# Patient Record
Sex: MALE | Race: WHITE | Employment: FULL TIME | URBAN - METROPOLITAN AREA
[De-identification: names, ages, dates, MRNs, and addresses within clinical notes are randomized per-mention and may not be internally consistent; named-entity substitution may affect disease eponyms.]

---

## 2017-05-15 ENCOUNTER — TELEPHONE (OUTPATIENT)
Dept: FAMILY MEDICINE CLINIC | Facility: CLINIC | Age: 52
End: 2017-05-15

## 2017-05-15 ENCOUNTER — OFFICE VISIT (OUTPATIENT)
Dept: FAMILY MEDICINE CLINIC | Facility: CLINIC | Age: 52
End: 2017-05-15

## 2017-05-15 VITALS
WEIGHT: 202 LBS | RESPIRATION RATE: 14 BRPM | TEMPERATURE: 99 F | HEIGHT: 67 IN | OXYGEN SATURATION: 100 % | BODY MASS INDEX: 31.71 KG/M2 | DIASTOLIC BLOOD PRESSURE: 80 MMHG | HEART RATE: 99 BPM | SYSTOLIC BLOOD PRESSURE: 130 MMHG

## 2017-05-15 DIAGNOSIS — R05.9 COUGH: Primary | ICD-10-CM

## 2017-05-15 DIAGNOSIS — K21.9 GASTROESOPHAGEAL REFLUX DISEASE, ESOPHAGITIS PRESENCE NOT SPECIFIED: ICD-10-CM

## 2017-05-15 DIAGNOSIS — R04.2 HEMOPTYSIS: ICD-10-CM

## 2017-05-15 DIAGNOSIS — R06.2 WHEEZING: ICD-10-CM

## 2017-05-15 DIAGNOSIS — D86.9 SARCOID: ICD-10-CM

## 2017-05-15 PROCEDURE — 99204 OFFICE O/P NEW MOD 45 MIN: CPT | Performed by: FAMILY MEDICINE

## 2017-05-15 RX ORDER — ESOMEPRAZOLE MAGNESIUM 40 MG/1
40 CAPSULE, DELAYED RELEASE ORAL DAILY
Qty: 30 CAPSULE | Refills: 1 | Status: SHIPPED | OUTPATIENT
Start: 2017-05-15 | End: 2017-07-27

## 2017-05-15 RX ORDER — OMEPRAZOLE 20 MG/1
20 CAPSULE, DELAYED RELEASE ORAL
COMMUNITY
End: 2017-05-22

## 2017-05-15 RX ORDER — AZITHROMYCIN 250 MG/1
TABLET, FILM COATED ORAL
Qty: 6 TABLET | Refills: 0 | Status: SHIPPED | OUTPATIENT
Start: 2017-05-15 | End: 2017-05-30

## 2017-05-15 NOTE — PROGRESS NOTES
HPI:    Patient ID: Abdulkadir Sotomayor is a 46year old male. HPI  Patient is here to establish care with a new primary care doctor. 2000 chest pain, dx with cardiac and lung sarcoidosis, worked for [employer], asbestos in the buildings.  GERD, 1 1/2 yrs a to palpation,  mild wheezing, no rhonchi, no rales, air entry is adequate, no accessory respiratory muscle use, no chest asymmetry, normal excursion.   GI:  bowel sound positive in all four quadrants, abdomen is soft, non-tender, non-distended, no hepatospl Referrals:  None       ZD#[id number]

## 2017-05-15 NOTE — TELEPHONE ENCOUNTER
Dr Danelle Verdugo will see patient on Friday 5/19/17 at 3:10PM at [address] [place] [address], [phone]. Patient is only able to make appointments in the morning or on Mondays. Patient will see Dr Jana Aguilera on Monday 5/22/17 @ 3pm in [place].  Patient is

## 2017-05-30 ENCOUNTER — HOSPITAL ENCOUNTER (OUTPATIENT)
Dept: GENERAL RADIOLOGY | Age: 52
Discharge: HOME | End: 2017-05-30
Attending: FAMILY MEDICINE
Payer: COMMERCIAL

## 2017-05-30 ENCOUNTER — OFFICE VISIT (OUTPATIENT)
Dept: FAMILY MEDICINE CLINIC | Facility: CLINIC | Age: 52
End: 2017-05-30

## 2017-05-30 VITALS
BODY MASS INDEX: 31.07 KG/M2 | OXYGEN SATURATION: 99 % | DIASTOLIC BLOOD PRESSURE: 78 MMHG | SYSTOLIC BLOOD PRESSURE: 132 MMHG | HEIGHT: 68 IN | RESPIRATION RATE: 12 BRPM | TEMPERATURE: 98 F | WEIGHT: 205 LBS | HEART RATE: 87 BPM

## 2017-05-30 DIAGNOSIS — D86.9 SARCOIDOSIS: ICD-10-CM

## 2017-05-30 DIAGNOSIS — R05.8 PRODUCTIVE COUGH: ICD-10-CM

## 2017-05-30 DIAGNOSIS — R06.2 WHEEZING: ICD-10-CM

## 2017-05-30 DIAGNOSIS — R06.2 WHEEZING: Primary | ICD-10-CM

## 2017-05-30 DIAGNOSIS — R04.2 HEMOPTYSIS: ICD-10-CM

## 2017-05-30 PROCEDURE — 99214 OFFICE O/P EST MOD 30 MIN: CPT | Performed by: FAMILY MEDICINE

## 2017-05-30 PROCEDURE — 71020 XR CHEST PA + LAT CHEST (CPT=71020): CPT | Performed by: FAMILY MEDICINE

## 2017-05-30 NOTE — PROGRESS NOTES
HPI:    Patient ID: Az Reddy is a 46year old male. HPI  Patient is here for follow-up of cough congestion hemoptysis and sarcoidosis. He did see GI in the interim. GI workup negative for bleeding.   Patient was put on pantoprazole instead of Nex ASSESSMENT/PLAN:   Sarcoidosis (hcc)  Wheezing  (primary encounter diagnosis)  Hemoptysis  Productive cough  I would recommend patient see a local pulmonary doctor and I understand he sees [address] once a year.   I would like evaluation for hemoptysis,

## 2017-05-31 ENCOUNTER — MED REC SCAN ONLY (OUTPATIENT)
Dept: FAMILY MEDICINE CLINIC | Facility: CLINIC | Age: 52
End: 2017-05-31

## 2017-06-09 ENCOUNTER — OFFICE VISIT (OUTPATIENT)
Dept: FAMILY MEDICINE CLINIC | Facility: CLINIC | Age: 52
End: 2017-06-09

## 2017-06-09 VITALS
WEIGHT: 205.5 LBS | HEART RATE: 93 BPM | DIASTOLIC BLOOD PRESSURE: 70 MMHG | SYSTOLIC BLOOD PRESSURE: 120 MMHG | OXYGEN SATURATION: 99 % | RESPIRATION RATE: 14 BRPM | TEMPERATURE: 98 F | BODY MASS INDEX: 31.14 KG/M2 | HEIGHT: 68 IN

## 2017-06-09 DIAGNOSIS — R05.8 PRODUCTIVE COUGH: ICD-10-CM

## 2017-06-09 DIAGNOSIS — J84.10 FIBROSIS OF LUNG (HCC): ICD-10-CM

## 2017-06-09 DIAGNOSIS — D86.9 SARCOIDOSIS: Primary | ICD-10-CM

## 2017-06-09 PROCEDURE — 99214 OFFICE O/P EST MOD 30 MIN: CPT | Performed by: FAMILY MEDICINE

## 2017-06-09 NOTE — PROGRESS NOTES
HPI:    Patient ID: Justin Mittal is a 46year old male. HPI  Patient is here for follow-up of productive cough and sarcoidosis. He did a chest x-ray done. He does get some slight trouble taking in a deep breath at times.   Review of Systems  Negative ASSESSMENT/PLAN:   Sarcoidosis (hcc)  (primary encounter diagnosis)  Fibrosis of lung (hcc)  Productive cough  Chest x-ray does show interstitial fibrosis pattern. Patient states that he was told in the past that he had sarcoidosis.   I did  the

## 2017-06-27 ENCOUNTER — OFFICE VISIT (OUTPATIENT)
Dept: FAMILY MEDICINE CLINIC | Facility: CLINIC | Age: 52
End: 2017-06-27

## 2017-06-27 VITALS
DIASTOLIC BLOOD PRESSURE: 68 MMHG | OXYGEN SATURATION: 99 % | WEIGHT: 207 LBS | HEART RATE: 99 BPM | SYSTOLIC BLOOD PRESSURE: 128 MMHG | RESPIRATION RATE: 14 BRPM | TEMPERATURE: 99 F | BODY MASS INDEX: 31 KG/M2

## 2017-06-27 DIAGNOSIS — Z00.00 ROUTINE ADULT HEALTH MAINTENANCE: ICD-10-CM

## 2017-06-27 DIAGNOSIS — D86.9 SARCOID: Primary | ICD-10-CM

## 2017-06-27 DIAGNOSIS — E78.2 MIXED HYPERLIPIDEMIA: ICD-10-CM

## 2017-06-27 PROCEDURE — 99214 OFFICE O/P EST MOD 30 MIN: CPT | Performed by: FAMILY MEDICINE

## 2017-06-27 NOTE — PROGRESS NOTES
HPI:    Patient ID: Mónica Cadena is a 46year old male. HPI  Patient is here for follow-up of sarcoidosis. He has been using Atrovent and has noticed some relief. He has no shortness of breath and only slight cough off and on.   He has appointment wi no abnormal aortic pulsation. ASSESSMENT/PLAN:   Sarcoid (hcc)  (primary encounter diagnosis)  Mixed hyperlipidemia  Routine adult health maintenance  Lungs sound clear. Patient may hold Atrovent.   If he has any breathing difficulty he will see

## 2017-07-13 PROCEDURE — 36415 COLL VENOUS BLD VENIPUNCTURE: CPT | Performed by: INTERNAL MEDICINE

## 2017-07-13 PROCEDURE — 82164 ANGIOTENSIN I ENZYME TEST: CPT | Performed by: INTERNAL MEDICINE

## 2017-08-09 ENCOUNTER — APPOINTMENT (OUTPATIENT)
Dept: LAB | Age: 52
End: 2017-08-09
Attending: FAMILY MEDICINE
Payer: COMMERCIAL

## 2017-08-09 DIAGNOSIS — Z00.00 ROUTINE ADULT HEALTH MAINTENANCE: ICD-10-CM

## 2017-08-09 LAB
ALBUMIN SERPL-MCNC: 3.8 G/DL (ref 3.5–4.8)
ALP LIVER SERPL-CCNC: 96 U/L (ref 45–117)
ALT SERPL-CCNC: 52 U/L (ref 17–63)
AST SERPL-CCNC: 31 U/L (ref 15–41)
BILIRUB SERPL-MCNC: 0.5 MG/DL (ref 0.1–2)
BUN BLD-MCNC: 14 MG/DL (ref 8–20)
CALCIUM BLD-MCNC: 8.9 MG/DL (ref 8.3–10.3)
CHLORIDE: 106 MMOL/L (ref 101–111)
CHOLEST SMN-MCNC: 166 MG/DL (ref ?–200)
CO2: 27 MMOL/L (ref 22–32)
CREAT BLD-MCNC: 1.16 MG/DL (ref 0.7–1.3)
ERYTHROCYTE [DISTWIDTH] IN BLOOD BY AUTOMATED COUNT: 12.7 % (ref 11.5–16)
GLUCOSE BLD-MCNC: 88 MG/DL (ref 70–99)
HCT VFR BLD AUTO: 47.7 % (ref 37–53)
HDLC SERPL-MCNC: 49 MG/DL (ref 45–?)
HDLC SERPL: 3.39 {RATIO} (ref ?–4.97)
HGB BLD-MCNC: 16.4 G/DL (ref 13–17)
LDLC SERPL CALC-MCNC: 70 MG/DL (ref ?–130)
LDLC SERPL-MCNC: 47 MG/DL (ref 5–40)
M PROTEIN MFR SERPL ELPH: 7.3 G/DL (ref 6.1–8.3)
MCH RBC QN AUTO: 31.3 PG (ref 27–33.2)
MCHC RBC AUTO-ENTMCNC: 34.4 G/DL (ref 31–37)
MCV RBC AUTO: 91 FL (ref 80–99)
NONHDLC SERPL-MCNC: 117 MG/DL (ref ?–130)
PLATELET # BLD AUTO: 193 10(3)UL (ref 150–450)
POTASSIUM SERPL-SCNC: 3.9 MMOL/L (ref 3.6–5.1)
PSA SERPL-MCNC: 1.58 NG/ML (ref 0.01–4)
RBC # BLD AUTO: 5.24 X10(6)UL (ref 4.3–5.7)
RED CELL DISTRIBUTION WIDTH-SD: 42.7 FL (ref 35.1–46.3)
SODIUM SERPL-SCNC: 140 MMOL/L (ref 136–144)
TRIGLYCERIDES: 234 MG/DL (ref ?–150)
WBC # BLD AUTO: 6.8 X10(3) UL (ref 4–13)

## 2017-08-09 PROCEDURE — 80061 LIPID PANEL: CPT | Performed by: FAMILY MEDICINE

## 2017-08-09 PROCEDURE — 36415 COLL VENOUS BLD VENIPUNCTURE: CPT | Performed by: FAMILY MEDICINE

## 2017-08-09 PROCEDURE — 84153 ASSAY OF PSA TOTAL: CPT | Performed by: FAMILY MEDICINE

## 2017-08-09 PROCEDURE — 80053 COMPREHEN METABOLIC PANEL: CPT | Performed by: FAMILY MEDICINE

## 2017-08-09 PROCEDURE — 85027 COMPLETE CBC AUTOMATED: CPT | Performed by: FAMILY MEDICINE

## 2017-08-14 ENCOUNTER — HOSPITAL ENCOUNTER (OUTPATIENT)
Dept: GENERAL RADIOLOGY | Age: 52
Discharge: HOME | End: 2017-08-14
Attending: FAMILY MEDICINE
Payer: COMMERCIAL

## 2017-08-14 ENCOUNTER — OFFICE VISIT (OUTPATIENT)
Dept: FAMILY MEDICINE CLINIC | Facility: CLINIC | Age: 52
End: 2017-08-14

## 2017-08-14 VITALS
HEIGHT: 68 IN | WEIGHT: 209.63 LBS | DIASTOLIC BLOOD PRESSURE: 80 MMHG | SYSTOLIC BLOOD PRESSURE: 122 MMHG | HEART RATE: 72 BPM | TEMPERATURE: 98 F | BODY MASS INDEX: 31.77 KG/M2

## 2017-08-14 DIAGNOSIS — E78.00 HIGH CHOLESTEROL: ICD-10-CM

## 2017-08-14 DIAGNOSIS — M54.5 BILATERAL LOW BACK PAIN, UNSPECIFIED CHRONICITY, WITH SCIATICA PRESENCE UNSPECIFIED: ICD-10-CM

## 2017-08-14 DIAGNOSIS — Z00.00 ROUTINE ADULT HEALTH MAINTENANCE: Primary | ICD-10-CM

## 2017-08-14 PROCEDURE — 99396 PREV VISIT EST AGE 40-64: CPT | Performed by: FAMILY MEDICINE

## 2017-08-14 PROCEDURE — 99213 OFFICE O/P EST LOW 20 MIN: CPT | Performed by: FAMILY MEDICINE

## 2017-08-14 PROCEDURE — 72110 X-RAY EXAM L-2 SPINE 4/>VWS: CPT | Performed by: FAMILY MEDICINE

## 2017-08-14 NOTE — PROGRESS NOTES
Rayna Bunch is a 46year old male who presents for a complete physical exam.   HPI:   Pt complains of chronic low back pain for several years. No trauma. He did see pulmonary doctor locally for sarcoidosis and will see them once every 6-12 months.   He Prescriptions:  Pantoprazole Sodium 40 MG Oral Tab EC Take 1 tablet (40 mg total) by mouth daily.  Before meal Disp: 30 tablet Rfl: 11   PROAIR  (90 Base) MCG/ACT Inhalation Aero Soln INHALE 2 PUFFS INTO THE LUNGS EVERY 4 (FOUR) HOURS AS NEEDED (SOB symptoms  LUNGS: denies AYUSH, wheezing, cough, orthopnea and PND  CARDIOVASCULAR: denies CP, palpitations, rapid or slow heart rate.  Denies BARKLEY/lower extremity swelling  GI: denies abdominal pain,denies heartburn, denies n/v/c/d/change in stools/blood in st x3, CN II-XII grossly intact. Reflexes: biceps and patellar 2+ b/l and symmetric.  Gait is normal.  PSYCH: normal affect, no apparent thought disorder, average judgement and insight      Immunization History  Administered            Date(s) Administered needed. He can follow-up with me in 3 months. There are no Patient Instructions on file for this visit.           XR LUMBAR SPINE (MIN 4 VIEWS) (CPT=72110)

## 2017-11-01 ENCOUNTER — MED REC SCAN ONLY (OUTPATIENT)
Dept: FAMILY MEDICINE CLINIC | Facility: CLINIC | Age: 52
End: 2017-11-01

## 2017-11-08 ENCOUNTER — OFFICE VISIT (OUTPATIENT)
Dept: FAMILY MEDICINE CLINIC | Facility: CLINIC | Age: 52
End: 2017-11-08

## 2017-11-08 VITALS
TEMPERATURE: 98 F | OXYGEN SATURATION: 99 % | SYSTOLIC BLOOD PRESSURE: 142 MMHG | HEIGHT: 68 IN | HEART RATE: 86 BPM | RESPIRATION RATE: 14 BRPM | DIASTOLIC BLOOD PRESSURE: 88 MMHG | WEIGHT: 216 LBS | BODY MASS INDEX: 32.74 KG/M2

## 2017-11-08 DIAGNOSIS — R05.8 PRODUCTIVE COUGH: Primary | ICD-10-CM

## 2017-11-08 PROCEDURE — 99213 OFFICE O/P EST LOW 20 MIN: CPT | Performed by: FAMILY MEDICINE

## 2017-11-08 RX ORDER — AMOXICILLIN AND CLAVULANATE POTASSIUM 875; 125 MG/1; MG/1
1 TABLET, FILM COATED ORAL 2 TIMES DAILY
Qty: 20 TABLET | Refills: 0 | Status: SHIPPED | OUTPATIENT
Start: 2017-11-08 | End: 2017-11-18

## 2017-11-08 RX ORDER — HYDROCODONE POLISTIREX AND CHLORPHENIRAMINE POLISTIREX 10; 8 MG/5ML; MG/5ML
5 SUSPENSION, EXTENDED RELEASE ORAL NIGHTLY PRN
Qty: 115 ML | Refills: 0 | Status: ON HOLD | OUTPATIENT
Start: 2017-11-08 | End: 2018-04-12

## 2017-11-16 ENCOUNTER — OFFICE VISIT (OUTPATIENT)
Dept: FAMILY MEDICINE CLINIC | Facility: CLINIC | Age: 52
End: 2017-11-16

## 2017-11-16 VITALS
TEMPERATURE: 98 F | OXYGEN SATURATION: 100 % | WEIGHT: 211 LBS | RESPIRATION RATE: 12 BRPM | DIASTOLIC BLOOD PRESSURE: 78 MMHG | HEART RATE: 103 BPM | SYSTOLIC BLOOD PRESSURE: 118 MMHG | BODY MASS INDEX: 31.98 KG/M2 | HEIGHT: 68 IN

## 2017-11-16 DIAGNOSIS — R05.8 PRODUCTIVE COUGH: Primary | ICD-10-CM

## 2017-11-16 DIAGNOSIS — E78.00 HIGH CHOLESTEROL: ICD-10-CM

## 2017-11-16 PROCEDURE — 99213 OFFICE O/P EST LOW 20 MIN: CPT | Performed by: FAMILY MEDICINE

## 2017-11-16 NOTE — PROGRESS NOTES
HPI:    Patient ID: Summer Gilliam is a 46year old male. HPI  Patient is here for follow-up of cough and congestion. He is doing much better. Slight loose stool with Augmentin but no nausea. He has 1 day left.   Review of Systems  Negative except for Augmentin. Reassurance. OTC cold medicine as needed. She should have fasting CMP and lipid panel in February 2018 and follow-up after that for high cholesterol check.     Orders Placed This Encounter      Comp Metabolic Panel (14) [E]      Lipid Panel [

## 2018-01-23 ENCOUNTER — LAB ENCOUNTER (OUTPATIENT)
Dept: LAB | Age: 53
End: 2018-01-23
Attending: FAMILY MEDICINE
Payer: COMMERCIAL

## 2018-01-23 DIAGNOSIS — E78.00 HIGH CHOLESTEROL: ICD-10-CM

## 2018-01-23 LAB
ALBUMIN SERPL-MCNC: 3.8 G/DL (ref 3.5–4.8)
ALP LIVER SERPL-CCNC: 78 U/L (ref 45–117)
ALT SERPL-CCNC: 46 U/L (ref 17–63)
AST SERPL-CCNC: 27 U/L (ref 15–41)
BILIRUB SERPL-MCNC: 0.5 MG/DL (ref 0.1–2)
BUN BLD-MCNC: 16 MG/DL (ref 8–20)
CALCIUM BLD-MCNC: 8.9 MG/DL (ref 8.3–10.3)
CHLORIDE: 105 MMOL/L (ref 101–111)
CHOLEST SMN-MCNC: 226 MG/DL (ref ?–200)
CO2: 27 MMOL/L (ref 22–32)
CREAT BLD-MCNC: 1.02 MG/DL (ref 0.7–1.3)
GLUCOSE BLD-MCNC: 94 MG/DL (ref 70–99)
HDLC SERPL-MCNC: 36 MG/DL (ref 45–?)
HDLC SERPL: 6.28 {RATIO} (ref ?–4.97)
LDLC SERPL CALC-MCNC: 126 MG/DL (ref ?–130)
M PROTEIN MFR SERPL ELPH: 7.2 G/DL (ref 6.1–8.3)
NONHDLC SERPL-MCNC: 190 MG/DL (ref ?–130)
POTASSIUM SERPL-SCNC: 4.1 MMOL/L (ref 3.6–5.1)
SODIUM SERPL-SCNC: 141 MMOL/L (ref 136–144)
TRIGL SERPL-MCNC: 319 MG/DL (ref ?–150)
VLDLC SERPL CALC-MCNC: 64 MG/DL (ref 5–40)

## 2018-01-23 PROCEDURE — 80053 COMPREHEN METABOLIC PANEL: CPT | Performed by: FAMILY MEDICINE

## 2018-01-23 PROCEDURE — 36415 COLL VENOUS BLD VENIPUNCTURE: CPT | Performed by: FAMILY MEDICINE

## 2018-01-23 PROCEDURE — 80061 LIPID PANEL: CPT | Performed by: FAMILY MEDICINE

## 2018-02-05 ENCOUNTER — TELEPHONE (OUTPATIENT)
Dept: FAMILY MEDICINE CLINIC | Facility: CLINIC | Age: 53
End: 2018-02-05

## 2018-02-05 DIAGNOSIS — R10.30 LOWER ABDOMINAL PAIN: ICD-10-CM

## 2018-02-05 DIAGNOSIS — E78.2 MIXED HYPERLIPIDEMIA: ICD-10-CM

## 2018-02-05 DIAGNOSIS — D86.9 SARCOID: ICD-10-CM

## 2018-02-05 RX ORDER — SIMVASTATIN 40 MG
TABLET ORAL
Qty: 90 TABLET | Refills: 1 | Status: SHIPPED | OUTPATIENT
Start: 2018-02-05 | End: 2018-08-11

## 2018-02-05 RX ORDER — FENOFIBRATE 200 MG/1
200 CAPSULE ORAL DAILY
Qty: 90 CAPSULE | Refills: 1 | Status: SHIPPED | OUTPATIENT
Start: 2018-02-05 | End: 2018-08-11

## 2018-02-05 NOTE — TELEPHONE ENCOUNTER
Requesting Simvastatin and Fenofibrate  LOV: 11/16/17  RTC: not noted  Last Relevant Labs: 1/23/18  Filled: 4/25/16 #90 with 0 refills  Fenofibrate  Filled: 4/4/17 #90 with 0 refills  Simvastatin    Future Appointments  Date Time Provider Tiffanie Do

## 2018-02-05 NOTE — PROGRESS NOTES
Elevated cholesterol, pt needs to work on diet and exercise, has pt been taking his medications?   Will discuss more at upcoming [facility]

## 2018-02-19 ENCOUNTER — HOSPITAL ENCOUNTER (OUTPATIENT)
Dept: GENERAL RADIOLOGY | Age: 53
Discharge: HOME | End: 2018-02-19
Attending: FAMILY MEDICINE
Payer: COMMERCIAL

## 2018-02-19 ENCOUNTER — OFFICE VISIT (OUTPATIENT)
Dept: FAMILY MEDICINE CLINIC | Facility: CLINIC | Age: 53
End: 2018-02-19

## 2018-02-19 VITALS
HEIGHT: 68 IN | DIASTOLIC BLOOD PRESSURE: 80 MMHG | SYSTOLIC BLOOD PRESSURE: 134 MMHG | BODY MASS INDEX: 32.58 KG/M2 | WEIGHT: 215 LBS | OXYGEN SATURATION: 99 % | HEART RATE: 82 BPM | TEMPERATURE: 98 F | RESPIRATION RATE: 16 BRPM

## 2018-02-19 DIAGNOSIS — E78.2 MIXED HYPERLIPIDEMIA: ICD-10-CM

## 2018-02-19 DIAGNOSIS — N52.9 ERECTILE DYSFUNCTION, UNSPECIFIED ERECTILE DYSFUNCTION TYPE: ICD-10-CM

## 2018-02-19 DIAGNOSIS — R05.9 COUGH: Primary | ICD-10-CM

## 2018-02-19 DIAGNOSIS — R05.9 COUGH: ICD-10-CM

## 2018-02-19 PROCEDURE — 71046 X-RAY EXAM CHEST 2 VIEWS: CPT | Performed by: FAMILY MEDICINE

## 2018-02-19 PROCEDURE — 99214 OFFICE O/P EST MOD 30 MIN: CPT | Performed by: FAMILY MEDICINE

## 2018-02-19 RX ORDER — TADALAFIL 10 MG/1
TABLET ORAL
Qty: 10 TABLET | Refills: 3 | Status: SHIPPED | OUTPATIENT
Start: 2018-02-19 | End: 2019-11-11

## 2018-02-19 NOTE — PROGRESS NOTES
HPI:    Patient ID: Timothy Cifuentes is a 46year old male. HPI  Patient is here for follow-up of high cholesterol. He states he just recently over the past 1 week restarted his cholesterol medicine fenofibrate and simvastatin 40 mg.   He had run out of m wheezing, no rhonchi, no rales, air entry is adequate, no accessory respiratory muscle use, no chest asymmetry, normal excursion.   GI:  bowel sound positive in all four quadrants, abdomen is soft, non-tender, non-distended, no hepatosplenomegaly, no abnorm

## 2018-04-12 ENCOUNTER — APPOINTMENT (OUTPATIENT)
Dept: CV DIAGNOSTICS | Age: 53
DRG: 313 | End: 2018-04-12
Attending: EMERGENCY MEDICINE
Payer: COMMERCIAL

## 2018-04-12 ENCOUNTER — APPOINTMENT (OUTPATIENT)
Dept: CT IMAGING | Age: 53
DRG: 313 | End: 2018-04-12
Attending: EMERGENCY MEDICINE
Payer: COMMERCIAL

## 2018-04-12 ENCOUNTER — HOSPITAL ENCOUNTER (INPATIENT)
Facility: HOSPITAL | Age: 53
LOS: 2 days | Discharge: HOME | DRG: 313 | End: 2018-04-15
Attending: EMERGENCY MEDICINE | Admitting: INTERNAL MEDICINE
Payer: COMMERCIAL

## 2018-04-12 ENCOUNTER — APPOINTMENT (OUTPATIENT)
Dept: GENERAL RADIOLOGY | Age: 53
DRG: 313 | End: 2018-04-12
Attending: EMERGENCY MEDICINE
Payer: COMMERCIAL

## 2018-04-12 DIAGNOSIS — R07.89 CHEST PAIN, ATYPICAL: Primary | ICD-10-CM

## 2018-04-12 DIAGNOSIS — R06.00 DYSPNEA, UNSPECIFIED TYPE: ICD-10-CM

## 2018-04-12 PROCEDURE — 99219 INITIAL OBSERVATION CARE,LEVL II: CPT | Performed by: INTERNAL MEDICINE

## 2018-04-12 PROCEDURE — 71045 X-RAY EXAM CHEST 1 VIEW: CPT | Performed by: EMERGENCY MEDICINE

## 2018-04-12 PROCEDURE — 71275 CT ANGIOGRAPHY CHEST: CPT | Performed by: EMERGENCY MEDICINE

## 2018-04-12 RX ORDER — SODIUM CHLORIDE 9 MG/ML
INJECTION, SOLUTION INTRAVENOUS CONTINUOUS
Status: ACTIVE | OUTPATIENT
Start: 2018-04-12 | End: 2018-04-12

## 2018-04-12 RX ORDER — ONDANSETRON 2 MG/ML
4 INJECTION INTRAMUSCULAR; INTRAVENOUS EVERY 6 HOURS PRN
Status: DISCONTINUED | OUTPATIENT
Start: 2018-04-12 | End: 2018-04-15

## 2018-04-12 RX ORDER — ONDANSETRON 2 MG/ML
4 INJECTION INTRAMUSCULAR; INTRAVENOUS EVERY 4 HOURS PRN
Status: DISCONTINUED | OUTPATIENT
Start: 2018-04-12 | End: 2018-04-12 | Stop reason: HOSPADM

## 2018-04-12 RX ORDER — ASPIRIN 81 MG/1
81 TABLET, CHEWABLE ORAL ONCE
Status: DISCONTINUED | OUTPATIENT
Start: 2018-04-12 | End: 2018-04-15

## 2018-04-12 RX ORDER — NITROGLYCERIN 0.4 MG/1
0.4 TABLET SUBLINGUAL EVERY 5 MIN PRN
Status: DISCONTINUED | OUTPATIENT
Start: 2018-04-12 | End: 2018-04-15

## 2018-04-12 RX ORDER — ASPIRIN 325 MG
325 TABLET ORAL DAILY
Status: DISCONTINUED | OUTPATIENT
Start: 2018-04-12 | End: 2018-04-15

## 2018-04-12 RX ORDER — ACETAMINOPHEN 325 MG/1
650 TABLET ORAL EVERY 6 HOURS PRN
Status: DISCONTINUED | OUTPATIENT
Start: 2018-04-12 | End: 2018-04-15

## 2018-04-12 RX ORDER — ENOXAPARIN SODIUM 100 MG/ML
40 INJECTION SUBCUTANEOUS NIGHTLY
Status: DISCONTINUED | OUTPATIENT
Start: 2018-04-12 | End: 2018-04-15

## 2018-04-12 RX ORDER — MORPHINE SULFATE 4 MG/ML
4 INJECTION, SOLUTION INTRAMUSCULAR; INTRAVENOUS ONCE
Status: COMPLETED | OUTPATIENT
Start: 2018-04-12 | End: 2018-04-12

## 2018-04-12 RX ORDER — HYDROXYCHLOROQUINE SULFATE 200 MG/1
200 TABLET, FILM COATED ORAL 2 TIMES DAILY
Status: DISCONTINUED | OUTPATIENT
Start: 2018-04-12 | End: 2018-04-15

## 2018-04-12 RX ORDER — ATORVASTATIN CALCIUM 20 MG/1
20 TABLET, FILM COATED ORAL NIGHTLY
Status: DISCONTINUED | OUTPATIENT
Start: 2018-04-12 | End: 2018-04-15

## 2018-04-12 RX ORDER — PANTOPRAZOLE SODIUM 40 MG/1
40 TABLET, DELAYED RELEASE ORAL DAILY
Status: DISCONTINUED | OUTPATIENT
Start: 2018-04-12 | End: 2018-04-15

## 2018-04-12 NOTE — ED INITIAL ASSESSMENT (HPI)
Pt started having some chest pain a couple days ago feeling nauseated and diaphoretic. Pt not vomited. Pt states the pain comes and goes no activity.

## 2018-04-12 NOTE — ED PROVIDER NOTES
Patient Seen in: [facility] Emergency Department In [place]    History   Patient presents with:  Chest Pain Angina (cardiovascular)    Stated Complaint: chest pain    HPI    Patient is a 49-year-old male presents emergency room with a history of intermitt Comment: weekends      Review of Systems    Positive for stated complaint: chest pain  Other systems are as noted in HPI. Constitutional and vital signs reviewed. All other systems reviewed and negative except as noted above.     Physical Exam   ED Tr Non- 59 (*)     All other components within normal limits   CBC W/ DIFFERENTIAL - Abnormal; Notable for the following:     Eosinophil Absolute 0.33 (*)     All other components within normal limits   TROPONIN I - Normal   PROTHROMBIN TIME ( etiologies are not excluded. 5.  2 small right lower lobe lung nodules. These may be related to parenchymal manifestations of sarcoidosis and are adjacent to a surgical staple line. Other etiologies are not excluded.  6.  Please correlate with prior cong Disposition and Plan     Clinical Impression:  Chest pain, atypical  (primary encounter diagnosis)  Dyspnea, unspecified type    Disposition:  Admit  4/12/2018  5:54 pm    Follow-up:  No follow-up provider specified.       Medications Prescribed:  Cur

## 2018-04-12 NOTE — PROGRESS NOTES
[facility]:  Patient from ER here for stress echo, but had to stop after 3:30 on treadmill due to shortness of breath and heartrate was only 116.   Patient states he has history of sarcoidosis of heart and lungs, and was once on the adler

## 2018-04-13 ENCOUNTER — APPOINTMENT (OUTPATIENT)
Dept: CV DIAGNOSTICS | Facility: HOSPITAL | Age: 53
DRG: 313 | End: 2018-04-13
Attending: INTERNAL MEDICINE
Payer: COMMERCIAL

## 2018-04-13 ENCOUNTER — APPOINTMENT (OUTPATIENT)
Dept: GENERAL RADIOLOGY | Facility: HOSPITAL | Age: 53
DRG: 313 | End: 2018-04-13
Attending: INTERNAL MEDICINE
Payer: COMMERCIAL

## 2018-04-13 PROCEDURE — 71046 X-RAY EXAM CHEST 2 VIEWS: CPT | Performed by: INTERNAL MEDICINE

## 2018-04-13 PROCEDURE — 93017 CV STRESS TEST TRACING ONLY: CPT | Performed by: INTERNAL MEDICINE

## 2018-04-13 PROCEDURE — 93306 TTE W/DOPPLER COMPLETE: CPT | Performed by: INTERNAL MEDICINE

## 2018-04-13 PROCEDURE — 99225 SUBSEQUENT OBSERVATION CARE: CPT | Performed by: HOSPITALIST

## 2018-04-13 PROCEDURE — 78452 HT MUSCLE IMAGE SPECT MULT: CPT | Performed by: INTERNAL MEDICINE

## 2018-04-13 PROCEDURE — 93018 CV STRESS TEST I&R ONLY: CPT | Performed by: INTERNAL MEDICINE

## 2018-04-13 RX ORDER — MAGNESIUM OXIDE 400 MG (241.3 MG MAGNESIUM) TABLET
400 TABLET ONCE
Status: COMPLETED | OUTPATIENT
Start: 2018-04-13 | End: 2018-04-13

## 2018-04-13 NOTE — PLAN OF CARE
Patient/Family Goals    • Patient/Family Long Term Goal Progressing    • Patient/Family Short Term Goal Progressing          HX: sarcoid- seen at [facility]- last stress 2017- cant get records    Patient came in for CP, stress echo started but had to stop after

## 2018-04-13 NOTE — PLAN OF CARE
Patient arrived from [place] ED at 6:55pm  Oriented to room  VSS taken; placed on telemetry    Report given to oncoming nurse

## 2018-04-13 NOTE — PAYOR COMM NOTE
--------------  ADMISSION REVIEW     Payor: [facility] PPO  Subscriber #:  [id number]  Authorization Number: N/A    Admit date: N/A  Admit time: N/A       Admitting Physician: Mason Campos MD  Attending Physician:  Alexx Nguyen MD  Primary Care Physician: History:  No date: COLONOSCOPY  11/2015: COLONOSCOPY      Comment: 4 small adeonomas, diverticulosis. repeat 2018  11/2015: EGD      Comment:  schatzki's ring, non obstructive. Erosive                esophagitis, LA grad 1 (no barretts on path). extremities. There are no gross motor or sensory deficits appreciated. Cranial nerves II through XII are intact. Patient is answering all questions appropriately.     ED Course     Labs Reviewed   COMP METABOLIC PANEL (14) - Abnormal; Notable for the follo 4/12/2018  CONCLUSION:  No focal consolidation. No significant change when compared to 2/19/18 chest radiograph.      Dictated by: Keshawn Aguilera MD on 4/12/2018 at 13:51     Approved by: Keshawn Aguilera MD      Patient had IV line established and blood work drawn i Filed:  4/13/2018 12:29 AM Date of Service:  4/12/2018  9:10 PM Status:  Signed    :  Tanesha Pacheco MD (Physician)         [facility]                                                               History & Physical     Marce Ward Patient 14 point review of systems was completed. Pertinent positives and negatives noted in the the HPI. Physical Exam:   Vital signs: Blood pressure 140/93, pulse 95, temperature 98.7 °F (37.1 °C), temperature source Oral, resp.  rate 20, height 5' 8\" (1.727 and hilar adenopathy. This may be related to the known sarcoidosis. Other etiologies are not excluded. 5.  2 small right lower lobe lung nodules.   These may be related to parenchymal manifestations of sarcoidosis and are adjacent to a surgical staple li User    4/12/2018 1802 Given 4 mg Intramuscular (Left Arm) Ingrid Villegas RN      ondansetron HCl [facility]) injection 4 mg     Date Action Dose Route User    4/12/2018 3250 Given 4 mg Intravenous Doreen Perez RN          REVIEWER COMMENTS:     FOR RE

## 2018-04-13 NOTE — PLAN OF CARE
A/o x3   c/o lightheadedness, dizziness, and occasional chest pain. Pt resting in bed. Cardiology to see pt. Dr. Luis Dunlap requesting records from [facility]. 1249:  Dr. Krista Del Real assessed and is ordering a lexiscan for today.      1712: Notifie

## 2018-04-13 NOTE — CM/SW NOTE
04/13/18 1400   CM/SW Screening   Referral [id number] [facility] staff; Chart review;Nursing rounds   Patient's Mental Status Alert;Oriented   Patient Status Prior to Admission   Independent with ADLs and Mobility Yes     Patient was

## 2018-04-13 NOTE — PROGRESS NOTES
[facility]  Progress Note     Giovani Munson Patient Status:  Observation    1965 MRN [id number]   [facility] 8NE-A Attending Estephania Fang MD   Hosp Day # 0 PCP Columbus Rubinstein, MD     Chief Complaint: chest pain    S: Patie BID   • Pantoprazole Sodium  40 mg Oral Daily   • fenofibrate micronized  201 mg Oral Daily   • atorvastatin  20 mg Oral Nightly   • aspirin  325 mg Oral Daily   • enoxaparin  40 mg Subcutaneous Nightly   • cefTRIAXone  1 g Intravenous Q24H   • anna

## 2018-04-13 NOTE — H&P
[facility]                                                               History & Physical         Fior Jalenerik Patient Status:  Observation    1965 MRN [id number]   [facility] 8NE-A Attending Viviane Preston MD   [facility] Day # Diabetes Sister       Reviewed    Social history:   reports that he has been smoking. He has never used smokeless tobacco. He reports that he drinks alcohol. He reports that he does not use drugs.     Allergies:    Prednisone              Hives    Home Med 46.0 04/12/2018   .0 04/12/2018   CREATSERUM 1.37 04/12/2018   BUN 12 04/12/2018    04/12/2018   K 4.0 04/12/2018    04/12/2018   CO2 27.0 04/12/2018   GLU 81 04/12/2018   CA 8.9 04/12/2018   ALB 4.0 04/12/2018   ALKPHO 71 04/12/2018   B cm.  VILMA:  Small bilateral lymph nodes. More prominent on the right 2.1 x 1.7 cm. More prominent on the left 1.5 x 1.2 cm. CARDIAC:  No pericardial effusion. PLEURA:  No pleural effusion. Mild areas of pleural thickening on the right.   CHEST WALL:  N

## 2018-04-13 NOTE — CONSULTS
[facility]  Report of Consultation    Vanessasam Beck Patient Status:  Observation    1965 MRN [id number]   [facility] 8NE-A Attending Ki Baugh MD   Hosp Day # 0 PCP Zion Lang MD     Reason for Consult Diabetes Sister       Smoking status: Current Some Day Smoker                                                    Packs/day: 0.00      Years: 0.00      Smokeless tobacco: Never Used                      Alcohol use: Yes           0.0 oz/week     Comment: we nourished male. Pt is in no acute distress. HEENT:   Normocephalic. Atraumatic. Eyes with no scleral icterus. Neck: Supple. No JVD. Carotids 2+ and equal in symmetric fashion. No bruits are noted. Cardiac: Regular rate and rhythm.    There is a nor Differential considerations include diffuse pneumonitis versus distal small airways disease. 4.  There is mild mediastinal and hilar adenopathy. This may be related to the known sarcoidosis. Other etiologies are not excluded.   5.  2 small right lower lo

## 2018-04-13 NOTE — PROGRESS NOTES
Preliminary Cardiac Diagnostic Note:    No ekg changes after pt injected with lexiscan for nuclear stress test  Pt denied cardiac symptoms  No arrhythmias  nuc pending

## 2018-04-13 NOTE — CONSULTS
Pulmonary H&P/Consult       NAME: Afsaneh Iraheta - ROOM: [address] [place] [address] - MRN: [id number] - Age: 46year old - :  1965    Date of Admission: 2018  1:04 PM  Admission Diagnosis: Chest pain, atypical [R07.89]  Dyspnea, unspecified type [R06.00]  Re Gastritis, no hpylori on path  No date: OTHER SURGICAL HISTORY      Comment: septoplasty     Prescriptions Prior to Admission:  Tadalafil 10 MG Oral Tab 1 tablet 1 hour prior to intercourse, may repeat in 72 hours as needed.  Disp: 10 tablet Rfl: 3 Taking Pantoprazole Sodium 40 MG Oral Tab EC Take 1 tablet (40 mg total) by mouth daily.  Before meal Disp: 90 tablet Rfl: 3   HYDROXYCHLOROQUINE SULFATE 200 MG Oral Tab TAKE 1 TABLET BY MOUTH TWICE A DAY Disp: 180 tablet Rfl: 2       Scheduled Medication:  • as Intake             1150 ml   Output                0 ml   Net             1150 ml       /66 (BP Location: Right arm)   Pulse 78   Temp 98.4 °F (36.9 °C) (Oral)   Resp 18   Ht 5' 8\" (1.727 m)   Wt 211 lb 11.2 oz (96 kg)   SpO2 97%   BMI 32.19 kg/m² hours.    Invalid input(s): TROPI      INR   Date/Time Value Ref Range Status   04/12/2018 01:19 PM 1.01 0.89 - 1.12 Final   ----------  Albumin   Date/Time Value Ref Range Status   04/12/2018 01:19 PM 4.0 3.5 - 4.8 g/dL Final   04/11/2016 10:53 AM 4.6 3.5

## 2018-04-13 NOTE — PAYOR COMM NOTE
--------------  CONTINUED STAY REVIEW    Payor: [facility] PPO  Subscriber #:  [id number]  Authorization Number: N/A    Admit date: N/A  Admit time: N/A    Admitting Physician: Stanley Fleischer, MD  Attending Physician:  Axel Wiley MD  Primary Care Physician more recent imaging will be able to make a better informed decision as to how to proceed  -await outside records, if CT changes are chronic could attempt a burst of IV steroids vs decadron and monitor for improvement  -if changes are new would consider bro Dose Route User    4/13/2018 0944 Given 40 mg Oral Aleisha Curry, RN      fenofibrate micronized (LOFIBRA) cap 201 mg     Date Action Dose Route User    4/13/2018 0944 Given 201 mg Oral Aleisha Curry, RN          FOR CONTINUED REVIEW/APPROVAL     PLEASE

## 2018-04-14 PROCEDURE — 99232 SBSQ HOSP IP/OBS MODERATE 35: CPT | Performed by: HOSPITALIST

## 2018-04-14 RX ORDER — MAGNESIUM OXIDE 400 MG (241.3 MG MAGNESIUM) TABLET
400 TABLET ONCE
Status: COMPLETED | OUTPATIENT
Start: 2018-04-14 | End: 2018-04-14

## 2018-04-14 RX ORDER — METHYLPREDNISOLONE SODIUM SUCCINATE 125 MG/2ML
125 INJECTION, POWDER, LYOPHILIZED, FOR SOLUTION INTRAMUSCULAR; INTRAVENOUS EVERY 8 HOURS
Status: DISCONTINUED | OUTPATIENT
Start: 2018-04-14 | End: 2018-04-15

## 2018-04-14 NOTE — PROGRESS NOTES
[facility]  Progress Note     Summer Gilliam Patient Status:  Observation    1965 MRN [id number]   [facility] 8NE-A Attending Kacy Caldwell MD   Hosp Day # 1 PCP Mihaela Ng MD     Chief Complaint: chest pain    S: Patie 20 mg Oral Nightly   • aspirin  325 mg Oral Daily   • enoxaparin  40 mg Subcutaneous Nightly   • cefTRIAXone  1 g Intravenous Q24H   • azithromycin  500 mg Intravenous Q24H       ASSESSMENT / PLAN:     1.  Chest pain- Unable to complete OP ST. s/p nuc stres

## 2018-04-14 NOTE — PROGRESS NOTES
Pulmonary Progress Note      NAME: Sheri Avelar - ROOM: 5773/2595-H - MRN: [id number] - Age: 46year old - : 1965    Assessment/Plan:  1.  Dyspnea  -unclear etiology  -may be related to CT changes  -no O2 requirement currently  -no wheezing on exa clear, MMM  Neck: Supple, no adenopathy or elevation in JVP  Cardiovascular: RRR, no murmurs or extra heart sounds   Respiratory: CTAB, no wheezing   GI: Soft, NTND, +normoactive BS   Ext: No cyanosis, clubbing or edema   Neuro: Interactive, answering ques

## 2018-04-14 NOTE — PROGRESS NOTES
[facility] Cardiology  Progress Note    Fior Mckeon Patient Status:  Inpatient    1965 MRN [id number]   [facility] 8NE-A Attending Austin Khan MD   Hosp Day # 1 PCP Mariano Baca MD     Subjective:   No chest pain t There is normal S1, S2. There is no S3 or S4. There are no murmurs, rubs, or gallops. No click is appreciated. PMI is nondisplaced with a normal apical impulse. Pulmonary:  Lungs are clear to auscultation bilaterally.   There are no focal rales, rhon normal. Systolic pressure was at the upper limits     of normal.  3. Left atrium: The left atrial volume was normal.  4. Aortic root: The aortic root was mildly dilated. 5. Mitral valve: There was trivial regurgitation. 6. Tricuspid valve:  There was mild

## 2018-04-15 VITALS
OXYGEN SATURATION: 95 % | HEIGHT: 68 IN | RESPIRATION RATE: 18 BRPM | WEIGHT: 211.69 LBS | SYSTOLIC BLOOD PRESSURE: 121 MMHG | TEMPERATURE: 98 F | HEART RATE: 86 BPM | DIASTOLIC BLOOD PRESSURE: 71 MMHG | BODY MASS INDEX: 32.08 KG/M2

## 2018-04-15 PROCEDURE — 99239 HOSP IP/OBS DSCHRG MGMT >30: CPT | Performed by: HOSPITALIST

## 2018-04-15 RX ORDER — MAGNESIUM OXIDE 400 MG (241.3 MG MAGNESIUM) TABLET
400 TABLET ONCE
Status: COMPLETED | OUTPATIENT
Start: 2018-04-15 | End: 2018-04-15

## 2018-04-15 NOTE — PROGRESS NOTES
[facility]  Progress Note    Abdulkadir Sotomayor Patient Status:  Inpatient    1965 MRN [id number]   [facility] 8NE-A Attending Cecil Crenshaw MD   Hosp Day # 2 PCP Meme Edwards MD     Subjective:   Comfortable in demonstrates a regular rate and rhythm. There is normal S1, S2. There is no S3 or S4. There are no murmurs, rubs, or gallops. No click is appreciated. PMI is nondisplaced with a normal apical impulse.     Pulmonary:  Lungs are clear to auscultation jade thickness was normal.     Systolic function was normal. Systolic pressure was at the upper limits     of normal.  3. Left atrium: The left atrial volume was normal.  4. Aortic root: The aortic root was mildly dilated. 5. Mitral valve:  There was trivial re

## 2018-04-15 NOTE — PLAN OF CARE
Problem: CARDIOVASCULAR - ADULT  Goal: Maintains optimal cardiac output and hemodynamic stability  INTERVENTIONS:  - Monitor vital signs, rhythm, and trends  - Monitor for bleeding, hypotension and signs of decreased cardiac output  - Evaluate effectivenes electrolyte imbalances  - Administer electrolyte replacement as ordered  - Monitor response to electrolyte replacements, including rhythm and repeat lab results as appropriate  - Fluid restriction as ordered  - Instruct patient on fluid and nutrition restr

## 2018-04-15 NOTE — PROGRESS NOTES
[facility] Patient Status:  Inpatient    1965 MRN [id number]   [facility] 8NE-A Attending Desmond Dawson MD   Hosp Day # 2 PCP Alton Lora MD     SUBJECTIVE: no new events.  Still has mild dyspnea and dry co INR 1.01 04/12/2018           Assessment/Plan:  1. Dyspnea  -unclear etiology  -doubt it is related to sarcoidosis flare up nor acute infection. Has not had fevers/chills/phlegm production. Cough is chronic and unchanged.   Inflammatory markers are norm

## 2018-04-15 NOTE — PROGRESS NOTES
[facility]  Progress Note     Nighat Goldberg Patient Status:  Observation    1965 MRN [id number]   [facility] 8NE-A Attending Heidi Calix MD   Hosp Day # 2 PCP Ramírez Atkins MD     Chief Complaint: chest pain    S: Patie 20 mg Oral Nightly   • aspirin  325 mg Oral Daily   • enoxaparin  40 mg Subcutaneous Nightly   • cefTRIAXone  1 g Intravenous Q24H       ASSESSMENT / PLAN:     1. Chest pain- Unable to complete OP ST. s/p nuc stress unremarkable  1.  Cardiology and pulmonar

## 2018-04-16 ENCOUNTER — PATIENT OUTREACH (OUTPATIENT)
Dept: CASE MANAGEMENT | Age: 53
End: 2018-04-16

## 2018-04-16 ENCOUNTER — OFFICE VISIT (OUTPATIENT)
Dept: FAMILY MEDICINE CLINIC | Facility: CLINIC | Age: 53
End: 2018-04-16

## 2018-04-16 VITALS
RESPIRATION RATE: 14 BRPM | HEIGHT: 68 IN | TEMPERATURE: 98 F | DIASTOLIC BLOOD PRESSURE: 80 MMHG | SYSTOLIC BLOOD PRESSURE: 134 MMHG | BODY MASS INDEX: 32.81 KG/M2 | HEART RATE: 77 BPM | WEIGHT: 216.5 LBS | OXYGEN SATURATION: 100 %

## 2018-04-16 DIAGNOSIS — J18.9 PNEUMONITIS: ICD-10-CM

## 2018-04-16 DIAGNOSIS — R06.00 DYSPNEA, UNSPECIFIED TYPE: ICD-10-CM

## 2018-04-16 DIAGNOSIS — Z02.9 ENCOUNTERS FOR ADMINISTRATIVE PURPOSE: ICD-10-CM

## 2018-04-16 DIAGNOSIS — R07.89 CHEST PAIN, ATYPICAL: Primary | ICD-10-CM

## 2018-04-16 PROCEDURE — 99214 OFFICE O/P EST MOD 30 MIN: CPT | Performed by: FAMILY MEDICINE

## 2018-04-16 PROCEDURE — 1111F DSCHRG MED/CURRENT MED MERGE: CPT | Performed by: FAMILY MEDICINE

## 2018-04-16 NOTE — DISCHARGE SUMMARY
[facility]  DISCHARGE SUMMARY     Deon Case Patient Status:  Inpatient    1965 MRN [id number]   [facility] 8NE-A Attending No att. providers found   [address] Day # 2 PCP Megan Salazar MD     Date of Admission: 2018  Da Synopsis: Cardiology and pulmonology were placed on consultation. Workup was virtually unremarkable. There is no acute sign of infection or exacerbation of sarcoidosis. Antibiotics were subsequently discontinued as well as IV steroids.   Patient was on r INSTRUCTIONS: See electronic chart    Obdulia Ray MD 4/16/2018    Time spent:  > 30 minutes

## 2018-04-16 NOTE — PROGRESS NOTES
HPI:    Patient ID: Hanna Vega is a 46year old male. HPI  Patient is here for follow-up of hospitalization for dyspnea and chest pain. He had cardiac workup done which was negative. Lung workup was negative as well.   Patient has history of sarcoi Judgement and insight are intact. No suicidal thoughts.    MS: No pain to palpation of the anterior chest wall         ASSESSMENT/PLAN:   Chest pain, atypical  (primary encounter diagnosis)  Dyspnea, unspecified type  Pneumonitis  Hospital records reviewed

## 2018-08-11 DIAGNOSIS — D86.9 SARCOID: ICD-10-CM

## 2018-08-11 DIAGNOSIS — R10.30 LOWER ABDOMINAL PAIN: ICD-10-CM

## 2018-08-11 DIAGNOSIS — E78.2 MIXED HYPERLIPIDEMIA: ICD-10-CM

## 2018-08-11 RX ORDER — FENOFIBRATE 200 MG/1
200 CAPSULE ORAL DAILY
Qty: 90 CAPSULE | Refills: 1 | Status: SHIPPED | OUTPATIENT
Start: 2018-08-11 | End: 2019-07-24

## 2018-08-11 RX ORDER — SIMVASTATIN 40 MG
TABLET ORAL
Qty: 90 TABLET | Refills: 1 | Status: SHIPPED | OUTPATIENT
Start: 2018-08-11 | End: 2019-02-08

## 2018-08-11 NOTE — TELEPHONE ENCOUNTER
Requested Medications   SIMVASTATIN 40 MG TABLET  Will file in chart as: SIMVASTATIN 40 MG Oral Tab  TAKE 1 TABLET (40 MG TOTAL) BY MOUTH ONCE DAILY.        Disp: 90 tablet Refills: 1    Class: Normal Start: 8/11/2018   Documented:2 years ago  Last refill:

## 2018-10-24 ENCOUNTER — OFFICE VISIT (OUTPATIENT)
Dept: FAMILY MEDICINE CLINIC | Facility: CLINIC | Age: 53
End: 2018-10-24
Payer: COMMERCIAL

## 2018-10-24 ENCOUNTER — APPOINTMENT (OUTPATIENT)
Dept: LAB | Age: 53
End: 2018-10-24
Attending: FAMILY MEDICINE
Payer: COMMERCIAL

## 2018-10-24 VITALS
BODY MASS INDEX: 32.13 KG/M2 | OXYGEN SATURATION: 99 % | RESPIRATION RATE: 12 BRPM | DIASTOLIC BLOOD PRESSURE: 98 MMHG | WEIGHT: 212 LBS | HEIGHT: 68 IN | SYSTOLIC BLOOD PRESSURE: 142 MMHG | TEMPERATURE: 99 F | HEART RATE: 88 BPM

## 2018-10-24 DIAGNOSIS — R07.9 CHEST PAIN, UNSPECIFIED TYPE: ICD-10-CM

## 2018-10-24 DIAGNOSIS — R07.9 CHEST PAIN, UNSPECIFIED TYPE: Primary | ICD-10-CM

## 2018-10-24 DIAGNOSIS — R05.9 COUGH: ICD-10-CM

## 2018-10-24 PROCEDURE — 93010 ELECTROCARDIOGRAM REPORT: CPT | Performed by: INTERNAL MEDICINE

## 2018-10-24 PROCEDURE — 36415 COLL VENOUS BLD VENIPUNCTURE: CPT

## 2018-10-24 PROCEDURE — 84484 ASSAY OF TROPONIN QUANT: CPT

## 2018-10-24 PROCEDURE — 93005 ELECTROCARDIOGRAM TRACING: CPT

## 2018-10-24 PROCEDURE — 85378 FIBRIN DEGRADE SEMIQUANT: CPT

## 2018-10-24 PROCEDURE — 99214 OFFICE O/P EST MOD 30 MIN: CPT | Performed by: FAMILY MEDICINE

## 2018-10-24 RX ORDER — AMOXICILLIN AND CLAVULANATE POTASSIUM 500; 125 MG/1; MG/1
1 TABLET, FILM COATED ORAL 2 TIMES DAILY
Qty: 20 TABLET | Refills: 0 | Status: SHIPPED | OUTPATIENT
Start: 2018-10-24 | End: 2018-11-03

## 2018-10-24 RX ORDER — LISINOPRIL 10 MG/1
10 TABLET ORAL DAILY
Qty: 30 TABLET | Refills: 0 | Status: SHIPPED | OUTPATIENT
Start: 2018-10-24 | End: 2018-10-31

## 2018-10-25 NOTE — PROGRESS NOTES
HPI:    Patient ID: Lc Lorenzo is a 46year old male.     HPI  Patient presents with:  Blood Pressure: pt wasnt feeling well today- dizziness and headache- pt took bp at pharmacy readings 155/116 and 164/111  Chest Pain: past 2 weeks    Slight cough as asymmetry, normal excursion. GI:  bowel sound positive in all four quadrants, abdomen is soft, non-tender, non-distended, no hepatosplenomegaly, no abnormal aortic pulsation.    NEURO: strength 5/5 all four extremities, sensation intact in all four extremi

## 2018-10-31 ENCOUNTER — OFFICE VISIT (OUTPATIENT)
Dept: FAMILY MEDICINE CLINIC | Facility: CLINIC | Age: 53
End: 2018-10-31
Payer: COMMERCIAL

## 2018-10-31 VITALS
OXYGEN SATURATION: 100 % | SYSTOLIC BLOOD PRESSURE: 120 MMHG | RESPIRATION RATE: 12 BRPM | DIASTOLIC BLOOD PRESSURE: 78 MMHG | WEIGHT: 212 LBS | HEIGHT: 68 IN | HEART RATE: 88 BPM | BODY MASS INDEX: 32.13 KG/M2 | TEMPERATURE: 99 F

## 2018-10-31 DIAGNOSIS — R06.00 DYSPNEA, UNSPECIFIED TYPE: ICD-10-CM

## 2018-10-31 DIAGNOSIS — R07.89 CHEST PAIN, ATYPICAL: Primary | ICD-10-CM

## 2018-10-31 DIAGNOSIS — D86.0 SARCOIDOSIS OF LUNG (HCC): ICD-10-CM

## 2018-10-31 PROCEDURE — 99214 OFFICE O/P EST MOD 30 MIN: CPT | Performed by: FAMILY MEDICINE

## 2018-10-31 RX ORDER — LISINOPRIL 10 MG/1
10 TABLET ORAL DAILY
Qty: 90 TABLET | Refills: 0 | Status: SHIPPED | OUTPATIENT
Start: 2018-10-31 | End: 2019-07-24

## 2018-10-31 NOTE — PROGRESS NOTES
HPI:    Patient ID: Maddi Higginbotham is a 46year old male. HPI  Patient is here for follow-up of chest pain and dyspnea. He still has it off and on but it is better than last time. No new symptoms.   Review of Systems  Negative except for the above non-tender, non-distended, no hepatosplenomegaly, no abnormal aortic pulsation. ASSESSMENT/PLAN:   Chest pain, atypical  (primary encounter diagnosis)  Dyspnea, unspecified type  Sarcoidosis of lung (hcc)  Clinically patient is doing better.   At

## 2018-12-15 ENCOUNTER — APPOINTMENT (OUTPATIENT)
Dept: CT IMAGING | Age: 53
End: 2018-12-15
Attending: EMERGENCY MEDICINE
Payer: COMMERCIAL

## 2018-12-15 ENCOUNTER — HOSPITAL ENCOUNTER (EMERGENCY)
Age: 53
Discharge: HOME | End: 2018-12-15
Attending: EMERGENCY MEDICINE
Payer: COMMERCIAL

## 2018-12-15 VITALS
HEIGHT: 68 IN | BODY MASS INDEX: 31.83 KG/M2 | RESPIRATION RATE: 16 BRPM | DIASTOLIC BLOOD PRESSURE: 81 MMHG | SYSTOLIC BLOOD PRESSURE: 135 MMHG | TEMPERATURE: 97 F | HEART RATE: 83 BPM | OXYGEN SATURATION: 97 % | WEIGHT: 210 LBS

## 2018-12-15 DIAGNOSIS — N20.1 URETEROLITHIASIS: Primary | ICD-10-CM

## 2018-12-15 PROCEDURE — 96361 HYDRATE IV INFUSION ADD-ON: CPT

## 2018-12-15 PROCEDURE — 83690 ASSAY OF LIPASE: CPT | Performed by: EMERGENCY MEDICINE

## 2018-12-15 PROCEDURE — 99284 EMERGENCY DEPT VISIT MOD MDM: CPT

## 2018-12-15 PROCEDURE — 87086 URINE CULTURE/COLONY COUNT: CPT | Performed by: EMERGENCY MEDICINE

## 2018-12-15 PROCEDURE — 96375 TX/PRO/DX INJ NEW DRUG ADDON: CPT

## 2018-12-15 PROCEDURE — 74176 CT ABD & PELVIS W/O CONTRAST: CPT | Performed by: EMERGENCY MEDICINE

## 2018-12-15 PROCEDURE — 85025 COMPLETE CBC W/AUTO DIFF WBC: CPT | Performed by: EMERGENCY MEDICINE

## 2018-12-15 PROCEDURE — 96374 THER/PROPH/DIAG INJ IV PUSH: CPT

## 2018-12-15 PROCEDURE — 81001 URINALYSIS AUTO W/SCOPE: CPT | Performed by: EMERGENCY MEDICINE

## 2018-12-15 PROCEDURE — 80053 COMPREHEN METABOLIC PANEL: CPT | Performed by: EMERGENCY MEDICINE

## 2018-12-15 RX ORDER — ONDANSETRON 4 MG/1
4 TABLET, ORALLY DISINTEGRATING ORAL EVERY 4 HOURS PRN
Qty: 10 TABLET | Refills: 0 | Status: SHIPPED | OUTPATIENT
Start: 2018-12-15 | End: 2018-12-22

## 2018-12-15 RX ORDER — KETOROLAC TROMETHAMINE 30 MG/ML
30 INJECTION, SOLUTION INTRAMUSCULAR; INTRAVENOUS ONCE
Status: COMPLETED | OUTPATIENT
Start: 2018-12-15 | End: 2018-12-15

## 2018-12-15 RX ORDER — HYDROMORPHONE HYDROCHLORIDE 1 MG/ML
0.5 INJECTION, SOLUTION INTRAMUSCULAR; INTRAVENOUS; SUBCUTANEOUS ONCE
Status: COMPLETED | OUTPATIENT
Start: 2018-12-15 | End: 2018-12-15

## 2018-12-15 RX ORDER — ONDANSETRON 2 MG/ML
4 INJECTION INTRAMUSCULAR; INTRAVENOUS ONCE
Status: COMPLETED | OUTPATIENT
Start: 2018-12-15 | End: 2018-12-15

## 2018-12-15 RX ORDER — HYDROMORPHONE HYDROCHLORIDE 1 MG/ML
0.5 INJECTION, SOLUTION INTRAMUSCULAR; INTRAVENOUS; SUBCUTANEOUS EVERY 30 MIN PRN
Status: DISCONTINUED | OUTPATIENT
Start: 2018-12-15 | End: 2018-12-15

## 2018-12-15 RX ORDER — HYDROCODONE BITARTRATE AND ACETAMINOPHEN 5; 325 MG/1; MG/1
1 TABLET ORAL EVERY 4 HOURS PRN
Qty: 10 TABLET | Refills: 0 | Status: SHIPPED | OUTPATIENT
Start: 2018-12-15 | End: 2018-12-22

## 2018-12-15 RX ORDER — TAMSULOSIN HYDROCHLORIDE 0.4 MG/1
0.4 CAPSULE ORAL DAILY
Qty: 7 CAPSULE | Refills: 0 | Status: SHIPPED | OUTPATIENT
Start: 2018-12-15 | End: 2018-12-22

## 2018-12-15 NOTE — ED INITIAL ASSESSMENT (HPI)
Patient c/o left sided lower back pain that radiates to abdomen x 2 hours. +nausea. Denies vomiting or diarrhea. Denies urinary symptoms.

## 2018-12-15 NOTE — ED PROVIDER NOTES
Patient Seen in: THE [facility] Emergency Department In [place]    History   Patient presents with:  Back Pain (musculoskeletal)    Stated Complaint: LEFT SIDE LOWER BACK PAIN RADIATING TO THE LEFT ABD     HPI    Presents with left abdomen/flank pain.   The pa Temporal   SpO2 97 %   O2 Device None (Room air)       Current:/81   Pulse 83   Temp 97.1 °F (36.2 °C) (Temporal)   Resp 16   Ht 172.7 cm (5' 8\")   Wt 95.3 kg   SpO2 97%   BMI 31.93 kg/m²         Physical Exam    General: Alert and oriented x3, appe -----------         ------                     CBC W/ DIFFERENTIAL[[id number]]          Abnormal            Final result                 Please view results for these tests on the individual orders.    URINE CULTURE, ROUTINE          Ct Abdomen+pelvis Ki air, fluid or inflammatory changes of the peritoneal cavity. ABDOMINAL WALL:  No mass or hernia. BONES:  No bony lesion or fracture. Mild bilateral osteoarthritic changes of the hips. Multilevel DISH of the thoracolumbar spine.  PELVIC ORGANS:  Normal fo Toradol and started on IV fluids. Ultimately he needed further pain relief and was given Dilaudid with improvement in his pain. MDM   Patient symptoms appear to be secondary to an obstructing renal stone on the left.   He feels comfortable going home

## 2019-02-08 ENCOUNTER — OFFICE VISIT (OUTPATIENT)
Dept: FAMILY MEDICINE CLINIC | Facility: CLINIC | Age: 54
End: 2019-02-08
Payer: COMMERCIAL

## 2019-02-08 ENCOUNTER — LAB ENCOUNTER (OUTPATIENT)
Dept: LAB | Age: 54
End: 2019-02-08
Attending: FAMILY MEDICINE
Payer: COMMERCIAL

## 2019-02-08 VITALS
SYSTOLIC BLOOD PRESSURE: 128 MMHG | HEIGHT: 68 IN | RESPIRATION RATE: 16 BRPM | TEMPERATURE: 98 F | WEIGHT: 210 LBS | DIASTOLIC BLOOD PRESSURE: 80 MMHG | OXYGEN SATURATION: 99 % | HEART RATE: 74 BPM | BODY MASS INDEX: 31.83 KG/M2

## 2019-02-08 DIAGNOSIS — E78.00 PURE HYPERCHOLESTEROLEMIA: ICD-10-CM

## 2019-02-08 DIAGNOSIS — M25.561 RIGHT KNEE PAIN, UNSPECIFIED CHRONICITY: Primary | ICD-10-CM

## 2019-02-08 DIAGNOSIS — Z00.00 ROUTINE ADULT HEALTH MAINTENANCE: ICD-10-CM

## 2019-02-08 DIAGNOSIS — K21.9 GASTROESOPHAGEAL REFLUX DISEASE, ESOPHAGITIS PRESENCE NOT SPECIFIED: ICD-10-CM

## 2019-02-08 LAB
ALBUMIN SERPL-MCNC: 4.2 G/DL (ref 3.1–4.5)
ALBUMIN/GLOB SERPL: 1.1 {RATIO} (ref 1–2)
ALP LIVER SERPL-CCNC: 105 U/L (ref 45–117)
ALT SERPL-CCNC: 42 U/L (ref 17–63)
ANION GAP SERPL CALC-SCNC: 6 MMOL/L (ref 0–18)
AST SERPL-CCNC: 29 U/L (ref 15–41)
BASOPHILS # BLD AUTO: 0.05 X10(3) UL (ref 0–0.2)
BASOPHILS NFR BLD AUTO: 0.6 %
BILIRUB SERPL-MCNC: 0.6 MG/DL (ref 0.1–2)
BUN BLD-MCNC: 14 MG/DL (ref 8–20)
BUN/CREAT SERPL: 12.3 (ref 10–20)
CALCIUM BLD-MCNC: 9.4 MG/DL (ref 8.3–10.3)
CHLORIDE SERPL-SCNC: 107 MMOL/L (ref 101–111)
CHOLEST SMN-MCNC: 244 MG/DL (ref ?–200)
CO2 SERPL-SCNC: 28 MMOL/L (ref 22–32)
COMPLEXED PSA SERPL-MCNC: 1.73 NG/ML (ref 0.01–4)
CREAT BLD-MCNC: 1.14 MG/DL (ref 0.7–1.3)
DEPRECATED RDW RBC AUTO: 42.8 FL (ref 35.1–46.3)
EOSINOPHIL # BLD AUTO: 0.44 X10(3) UL (ref 0–0.7)
EOSINOPHIL NFR BLD AUTO: 5.1 %
ERYTHROCYTE [DISTWIDTH] IN BLOOD BY AUTOMATED COUNT: 13.1 % (ref 11–15)
GLOBULIN PLAS-MCNC: 3.8 G/DL (ref 2.8–4.4)
GLUCOSE BLD-MCNC: 87 MG/DL (ref 70–99)
HCT VFR BLD AUTO: 51.3 % (ref 39–53)
HDLC SERPL-MCNC: 41 MG/DL (ref 40–59)
HGB BLD-MCNC: 17.5 G/DL (ref 13–17.5)
IMM GRANULOCYTES # BLD AUTO: 0.03 X10(3) UL (ref 0–1)
IMM GRANULOCYTES NFR BLD: 0.3 %
LDLC SERPL CALC-MCNC: 166 MG/DL (ref ?–100)
LYMPHOCYTES # BLD AUTO: 1.87 X10(3) UL (ref 1–4)
LYMPHOCYTES NFR BLD AUTO: 21.5 %
M PROTEIN MFR SERPL ELPH: 8 G/DL (ref 6.4–8.2)
MCH RBC QN AUTO: 30.9 PG (ref 26–34)
MCHC RBC AUTO-ENTMCNC: 34.1 G/DL (ref 31–37)
MCV RBC AUTO: 90.5 FL (ref 80–100)
MONOCYTES # BLD AUTO: 0.58 X10(3) UL (ref 0.1–1)
MONOCYTES NFR BLD AUTO: 6.7 %
NEUTROPHILS # BLD AUTO: 5.72 X10 (3) UL (ref 1.5–7.7)
NEUTROPHILS # BLD AUTO: 5.72 X10(3) UL (ref 1.5–7.7)
NEUTROPHILS NFR BLD AUTO: 65.8 %
NONHDLC SERPL-MCNC: 203 MG/DL (ref ?–130)
OSMOLALITY SERPL CALC.SUM OF ELEC: 292 MOSM/KG (ref 275–295)
PLATELET # BLD AUTO: 252 10(3)UL (ref 150–450)
POTASSIUM SERPL-SCNC: 5 MMOL/L (ref 3.6–5.1)
RBC # BLD AUTO: 5.67 X10(6)UL (ref 4.3–5.7)
SODIUM SERPL-SCNC: 141 MMOL/L (ref 136–144)
TRIGL SERPL-MCNC: 186 MG/DL (ref 30–149)
VLDLC SERPL CALC-MCNC: 37 MG/DL (ref 0–30)
WBC # BLD AUTO: 8.7 X10(3) UL (ref 4–11)

## 2019-02-08 PROCEDURE — 99214 OFFICE O/P EST MOD 30 MIN: CPT | Performed by: FAMILY MEDICINE

## 2019-02-08 PROCEDURE — 80061 LIPID PANEL: CPT | Performed by: FAMILY MEDICINE

## 2019-02-08 PROCEDURE — 85025 COMPLETE CBC W/AUTO DIFF WBC: CPT | Performed by: FAMILY MEDICINE

## 2019-02-08 PROCEDURE — 36415 COLL VENOUS BLD VENIPUNCTURE: CPT | Performed by: FAMILY MEDICINE

## 2019-02-08 PROCEDURE — 84153 ASSAY OF PSA TOTAL: CPT | Performed by: FAMILY MEDICINE

## 2019-02-08 PROCEDURE — 80053 COMPREHEN METABOLIC PANEL: CPT | Performed by: FAMILY MEDICINE

## 2019-02-08 RX ORDER — SIMVASTATIN 40 MG
TABLET ORAL
Qty: 90 TABLET | Refills: 1 | Status: SHIPPED | OUTPATIENT
Start: 2019-02-08 | End: 2019-07-24

## 2019-02-08 NOTE — PROGRESS NOTES
HPI:   Marce Hopper is a 48year old male that presents for Patient presents with:  Knee Pain: R knee 2 weeks- denies any injuries or falls. Medication Reaction: simvastatin & esomeprazole refill. patient did come fasting for blood work if needed. oriented, well developed, well nourished, no apparent distress.   HEENT:     Head:  Normocephalic, atraumatic    Eyes: EOMI, PERRLA, no scleral icterus, conjunctivae clear, no eye discharge    Ears: External normal. TMs normal without erythema or effusion Santos Benitez M.D.   [facility] Medicine   2/8/2019  10:00 AM

## 2019-02-09 PROBLEM — R07.89 CHEST PAIN, ATYPICAL: Status: RESOLVED | Noted: 2018-04-12 | Resolved: 2019-02-09

## 2019-02-09 PROBLEM — M25.561 RIGHT KNEE PAIN: Status: ACTIVE | Noted: 2019-02-09

## 2019-02-14 ENCOUNTER — HOSPITAL ENCOUNTER (OUTPATIENT)
Dept: GENERAL RADIOLOGY | Age: 54
Discharge: HOME | End: 2019-02-14
Attending: FAMILY MEDICINE
Payer: COMMERCIAL

## 2019-02-14 DIAGNOSIS — Z00.00 ROUTINE ADULT HEALTH MAINTENANCE: ICD-10-CM

## 2019-02-14 DIAGNOSIS — M25.561 RIGHT KNEE PAIN, UNSPECIFIED CHRONICITY: ICD-10-CM

## 2019-02-14 PROCEDURE — 73560 X-RAY EXAM OF KNEE 1 OR 2: CPT | Performed by: FAMILY MEDICINE

## 2019-03-04 ENCOUNTER — OFFICE VISIT (OUTPATIENT)
Dept: FAMILY MEDICINE CLINIC | Facility: CLINIC | Age: 54
End: 2019-03-04
Payer: COMMERCIAL

## 2019-03-04 ENCOUNTER — HOSPITAL ENCOUNTER (OUTPATIENT)
Dept: GENERAL RADIOLOGY | Age: 54
Discharge: HOME | End: 2019-03-04
Attending: FAMILY MEDICINE
Payer: COMMERCIAL

## 2019-03-04 VITALS
DIASTOLIC BLOOD PRESSURE: 90 MMHG | RESPIRATION RATE: 16 BRPM | TEMPERATURE: 98 F | BODY MASS INDEX: 31.98 KG/M2 | WEIGHT: 211 LBS | HEART RATE: 84 BPM | OXYGEN SATURATION: 99 % | SYSTOLIC BLOOD PRESSURE: 144 MMHG | HEIGHT: 68 IN

## 2019-03-04 DIAGNOSIS — M54.5 RIGHT LOW BACK PAIN, UNSPECIFIED CHRONICITY, WITH SCIATICA PRESENCE UNSPECIFIED: ICD-10-CM

## 2019-03-04 DIAGNOSIS — M25.551 PAIN OF RIGHT HIP JOINT: ICD-10-CM

## 2019-03-04 DIAGNOSIS — M79.604 PAIN OF RIGHT LOWER EXTREMITY: ICD-10-CM

## 2019-03-04 DIAGNOSIS — M79.89 LEG SWELLING: ICD-10-CM

## 2019-03-04 DIAGNOSIS — Z00.00 ROUTINE ADULT HEALTH MAINTENANCE: Primary | ICD-10-CM

## 2019-03-04 PROBLEM — M54.50 RIGHT LOW BACK PAIN: Status: ACTIVE | Noted: 2019-03-04

## 2019-03-04 PROCEDURE — 72100 X-RAY EXAM L-S SPINE 2/3 VWS: CPT | Performed by: FAMILY MEDICINE

## 2019-03-04 PROCEDURE — 73502 X-RAY EXAM HIP UNI 2-3 VIEWS: CPT | Performed by: FAMILY MEDICINE

## 2019-03-04 PROCEDURE — 99214 OFFICE O/P EST MOD 30 MIN: CPT | Performed by: FAMILY MEDICINE

## 2019-03-04 PROCEDURE — 99396 PREV VISIT EST AGE 40-64: CPT | Performed by: FAMILY MEDICINE

## 2019-03-04 NOTE — PROGRESS NOTES
Mónica Cadena is a 48year old male who presents for a complete physical exam.   HPI:   Pt complains of having right thigh pain off and on. He has some slight discoloration of the thigh as well.   He was seen by [facility]  and they suggested that he HCT 51.3 39.0 - 53.0 %    .0 150.0 - 450.0 10(3)uL    MCV 90.5 80.0 - 100.0 fL    MCH 30.9 26.0 - 34.0 pg    MCHC 34.1 31.0 - 37.0 g/dL    RDW 13.1 11.0 - 15.0 %    RDW-SD 42.8 35.1 - 46.3 fL    Neutrophil Absolute Prelim 5.72 1.50 - 7.70 x10 (3) uL • Sarcoidosis    • Shortness of breath    • Wheezing       Past Surgical History:   Procedure Laterality Date   • COLONOSCOPY     • COLONOSCOPY  11/2015    4 small adeonomas, diverticulosis.  repeat 2018   • EGD  11/2015     schatzki's ring, non obstructi (Temporal)   Resp 16   Ht 68\"   Wt 211 lb   SpO2 99%   BMI 32.08 kg/m²   Body mass index is 32.08 kg/m².    GENERAL: NAD, pleasant, well developed, normal voice  SKIN: no rashes, no suspicious moles or lesions  HENT: NCAT, EACs clear b/l, TMs normal b/l, n XR HIP + PELVIS MIN 4 VIEWS RIGHT (CPT=73503); Future    Right low back pain, unspecified chronicity, with sciatica presence unspecified  -     XR LUMBAR SPINE (MIN 2 VIEWS) (CPT=72100);  Future  -     Cancel: XR HIP + PELVIS MIN 4 VIEWS RIGHT (CPT=73503);

## 2019-03-06 ENCOUNTER — HOSPITAL ENCOUNTER (OUTPATIENT)
Dept: ULTRASOUND IMAGING | Age: 54
Discharge: HOME | End: 2019-03-06
Attending: FAMILY MEDICINE
Payer: COMMERCIAL

## 2019-03-06 DIAGNOSIS — M79.604 PAIN OF RIGHT LOWER EXTREMITY: ICD-10-CM

## 2019-03-06 DIAGNOSIS — M79.89 LEG SWELLING: ICD-10-CM

## 2019-03-06 PROCEDURE — 93971 EXTREMITY STUDY: CPT | Performed by: FAMILY MEDICINE

## 2019-03-11 ENCOUNTER — OFFICE VISIT (OUTPATIENT)
Dept: FAMILY MEDICINE CLINIC | Facility: CLINIC | Age: 54
End: 2019-03-11
Payer: COMMERCIAL

## 2019-03-11 VITALS
BODY MASS INDEX: 32.13 KG/M2 | OXYGEN SATURATION: 100 % | RESPIRATION RATE: 12 BRPM | SYSTOLIC BLOOD PRESSURE: 132 MMHG | HEART RATE: 92 BPM | TEMPERATURE: 98 F | WEIGHT: 212 LBS | HEIGHT: 68 IN | DIASTOLIC BLOOD PRESSURE: 88 MMHG

## 2019-03-11 DIAGNOSIS — M79.89 LEG SWELLING: Primary | ICD-10-CM

## 2019-03-11 DIAGNOSIS — M79.604 PAIN OF RIGHT LOWER EXTREMITY: ICD-10-CM

## 2019-03-11 PROCEDURE — 99214 OFFICE O/P EST MOD 30 MIN: CPT | Performed by: FAMILY MEDICINE

## 2019-03-11 NOTE — PROGRESS NOTES
HPI:   Dwain Dexter is a 48year old male that presents for test results. Patient is here for follow up on right lower extremity pain with swelling. Past medical, surgical, family and social history reviewed in detail with patient.   Updated whe apparent distress.   HEENT:     Head:  Normocephalic, atraumatic    Eyes: EOMI, PERRLA, no scleral icterus, conjunctivae clear, no eye discharge    Ears: External normal. TMs normal without erythema or effusion   Nose: patent, no nasal discharge    Throat:

## 2019-03-18 PROBLEM — K29.00 ACUTE GASTRITIS: Status: ACTIVE | Noted: 2019-03-18

## 2019-03-18 PROBLEM — K64.1 SECOND DEGREE HEMORRHOIDS: Status: ACTIVE | Noted: 2019-03-18

## 2019-03-18 PROBLEM — K57.30 DIVERTICULOSIS OF LARGE INTESTINE WITHOUT PERFORATION OR ABSCESS WITHOUT BLEEDING: Status: ACTIVE | Noted: 2019-03-18

## 2019-03-19 ENCOUNTER — TELEPHONE (OUTPATIENT)
Dept: FAMILY MEDICINE CLINIC | Facility: CLINIC | Age: 54
End: 2019-03-19

## 2019-03-19 DIAGNOSIS — M25.561 ACUTE PAIN OF RIGHT KNEE: ICD-10-CM

## 2019-03-19 DIAGNOSIS — M79.89 LEG SWELLING: Primary | ICD-10-CM

## 2019-03-19 NOTE — TELEPHONE ENCOUNTER
Dr. Ruth Tellez- Pt is calling to let you know swelling has not gone down in Right leg. Please advise.   [phone]

## 2019-03-19 NOTE — TELEPHONE ENCOUNTER
Patient informed of MD recommendations, patient verbalized understanding with intent to comply. Offered opportunity to ask questions, all questions were answered. No future appointments.     Dr Violeta Carpio Phone: [phone]

## 2019-03-19 NOTE — TELEPHONE ENCOUNTER
Patient states he still has leg swelling. PT stopped his medication and there is no change. Pain in Knee 5/10. Pt states Dr Deidre Koenig did mention a referral to Ortho. No fevers or chills    LOV: 3/11/19  ASSESSMENT AND PLAN:       1. Leg swelling  2.  Pain o

## 2019-05-13 PROBLEM — M75.52 BURSITIS OF LEFT SHOULDER: Status: ACTIVE | Noted: 2019-05-13

## 2019-05-13 PROBLEM — M75.22 BICEPS TENDONITIS, LEFT: Status: ACTIVE | Noted: 2019-05-13

## 2019-05-13 PROBLEM — M75.82 ROTATOR CUFF TENDONITIS, LEFT: Status: ACTIVE | Noted: 2019-05-13

## 2019-06-14 ENCOUNTER — HOSPITAL ENCOUNTER (EMERGENCY)
Facility: HOSPITAL | Age: 54
Discharge: HOME | End: 2019-06-14
Attending: EMERGENCY MEDICINE
Payer: COMMERCIAL

## 2019-06-14 VITALS
HEART RATE: 76 BPM | OXYGEN SATURATION: 95 % | WEIGHT: 210 LBS | TEMPERATURE: 97 F | BODY MASS INDEX: 31.83 KG/M2 | DIASTOLIC BLOOD PRESSURE: 86 MMHG | HEIGHT: 68 IN | RESPIRATION RATE: 22 BRPM | SYSTOLIC BLOOD PRESSURE: 134 MMHG

## 2019-06-14 DIAGNOSIS — N30.01 ACUTE CYSTITIS WITH HEMATURIA: Primary | ICD-10-CM

## 2019-06-14 PROCEDURE — 96361 HYDRATE IV INFUSION ADD-ON: CPT

## 2019-06-14 PROCEDURE — 87086 URINE CULTURE/COLONY COUNT: CPT | Performed by: EMERGENCY MEDICINE

## 2019-06-14 PROCEDURE — 99284 EMERGENCY DEPT VISIT MOD MDM: CPT

## 2019-06-14 PROCEDURE — 85025 COMPLETE CBC W/AUTO DIFF WBC: CPT | Performed by: EMERGENCY MEDICINE

## 2019-06-14 PROCEDURE — 80053 COMPREHEN METABOLIC PANEL: CPT | Performed by: EMERGENCY MEDICINE

## 2019-06-14 PROCEDURE — 96374 THER/PROPH/DIAG INJ IV PUSH: CPT

## 2019-06-14 PROCEDURE — 81001 URINALYSIS AUTO W/SCOPE: CPT | Performed by: EMERGENCY MEDICINE

## 2019-06-14 RX ORDER — ONDANSETRON 2 MG/ML
4 INJECTION INTRAMUSCULAR; INTRAVENOUS ONCE
Status: COMPLETED | OUTPATIENT
Start: 2019-06-14 | End: 2019-06-14

## 2019-06-14 RX ORDER — NITROFURANTOIN 25; 75 MG/1; MG/1
100 CAPSULE ORAL 2 TIMES DAILY
Qty: 14 CAPSULE | Refills: 0 | Status: SHIPPED | OUTPATIENT
Start: 2019-06-14 | End: 2019-06-21

## 2019-06-14 RX ORDER — NITROFURANTOIN 25; 75 MG/1; MG/1
100 CAPSULE ORAL 2 TIMES DAILY
Status: DISCONTINUED | OUTPATIENT
Start: 2019-06-14 | End: 2019-06-14

## 2019-06-15 NOTE — ED PROVIDER NOTES
Patient Seen in: [facility] Emergency Department    History   Patient presents with:  Urinary Symptoms (urologic)  Bleeding (hematologic)    Stated Complaint: hematuria    HPI    24-year-old male with a history of kidney stones, hypertension, hyperl ED Triage Vitals [06/14/19 2116]   BP (!) 161/93   Pulse 96   Resp 20   Temp 97.3 °F (36.3 °C)   Temp src Temporal   SpO2 97 %   O2 Device None (Room air)       Current:/86   Pulse 76   Temp 97.3 °F (36.3 °C) (Temporal)   Resp 22   Ht 172.7 cm (5 MICROSCOPIC W REFLEX CULTURE - Abnormal; Notable for the following components:    WBC Urine 11-20 (*)     RBC URINE >10 (*)     Bacteria Urine Rare (*)     All other components within normal limits   CBC WITH DIFFERENTIAL WITH PLATELET    Narrative:      Mary Carmen Willams Patient was screened and evaluated during this visit. As a treating physician attending to the patient, I determined, within reasonable clinical confidence and prior to discharge, that an emergency medical condition was not or was no longer present.   Misa Polly

## 2019-06-15 NOTE — ED INITIAL ASSESSMENT (HPI)
Pt to ED c/o hematuria since 1830. Pt states he voided 4x since then. Denies pain on urination, flank pain nor taking blood thinners. Hx of Kidney Stones.

## 2019-07-24 ENCOUNTER — APPOINTMENT (OUTPATIENT)
Dept: LAB | Age: 54
End: 2019-07-24
Attending: FAMILY MEDICINE
Payer: COMMERCIAL

## 2019-07-24 ENCOUNTER — OFFICE VISIT (OUTPATIENT)
Dept: FAMILY MEDICINE CLINIC | Facility: CLINIC | Age: 54
End: 2019-07-24
Payer: COMMERCIAL

## 2019-07-24 VITALS
DIASTOLIC BLOOD PRESSURE: 80 MMHG | WEIGHT: 208 LBS | RESPIRATION RATE: 16 BRPM | BODY MASS INDEX: 31.52 KG/M2 | HEART RATE: 76 BPM | SYSTOLIC BLOOD PRESSURE: 124 MMHG | OXYGEN SATURATION: 99 % | TEMPERATURE: 98 F | HEIGHT: 68 IN

## 2019-07-24 DIAGNOSIS — E78.00 HIGH CHOLESTEROL: ICD-10-CM

## 2019-07-24 DIAGNOSIS — D86.9 SARCOID: ICD-10-CM

## 2019-07-24 DIAGNOSIS — E78.2 MIXED HYPERLIPIDEMIA: ICD-10-CM

## 2019-07-24 DIAGNOSIS — R05.8 PRODUCTIVE COUGH: Primary | ICD-10-CM

## 2019-07-24 DIAGNOSIS — R10.30 LOWER ABDOMINAL PAIN: ICD-10-CM

## 2019-07-24 LAB
ALBUMIN SERPL-MCNC: 4.2 G/DL (ref 3.4–5)
ALBUMIN/GLOB SERPL: 1.2 {RATIO} (ref 1–2)
ALP LIVER SERPL-CCNC: 128 U/L (ref 45–117)
ALT SERPL-CCNC: 47 U/L (ref 16–61)
ANION GAP SERPL CALC-SCNC: 9 MMOL/L (ref 0–18)
AST SERPL-CCNC: 28 U/L (ref 15–37)
BILIRUB SERPL-MCNC: 0.9 MG/DL (ref 0.1–2)
BUN BLD-MCNC: 11 MG/DL (ref 7–18)
BUN/CREAT SERPL: 10.3 (ref 10–20)
CALCIUM BLD-MCNC: 9.5 MG/DL (ref 8.5–10.1)
CHLORIDE SERPL-SCNC: 105 MMOL/L (ref 98–112)
CHOLEST SMN-MCNC: 180 MG/DL (ref ?–200)
CO2 SERPL-SCNC: 27 MMOL/L (ref 21–32)
CREAT BLD-MCNC: 1.07 MG/DL (ref 0.7–1.3)
GLOBULIN PLAS-MCNC: 3.5 G/DL (ref 2.8–4.4)
GLUCOSE BLD-MCNC: 85 MG/DL (ref 70–99)
HDLC SERPL-MCNC: 50 MG/DL (ref 40–59)
LDLC SERPL CALC-MCNC: 100 MG/DL (ref ?–100)
M PROTEIN MFR SERPL ELPH: 7.7 G/DL (ref 6.4–8.2)
NONHDLC SERPL-MCNC: 130 MG/DL (ref ?–130)
OSMOLALITY SERPL CALC.SUM OF ELEC: 291 MOSM/KG (ref 275–295)
POTASSIUM SERPL-SCNC: 4 MMOL/L (ref 3.5–5.1)
SODIUM SERPL-SCNC: 141 MMOL/L (ref 136–145)
TRIGL SERPL-MCNC: 149 MG/DL (ref 30–149)
VLDLC SERPL CALC-MCNC: 30 MG/DL (ref 0–30)

## 2019-07-24 PROCEDURE — 99214 OFFICE O/P EST MOD 30 MIN: CPT | Performed by: FAMILY MEDICINE

## 2019-07-24 PROCEDURE — 80053 COMPREHEN METABOLIC PANEL: CPT | Performed by: FAMILY MEDICINE

## 2019-07-24 PROCEDURE — 80061 LIPID PANEL: CPT | Performed by: FAMILY MEDICINE

## 2019-07-24 PROCEDURE — 36415 COLL VENOUS BLD VENIPUNCTURE: CPT | Performed by: FAMILY MEDICINE

## 2019-07-24 RX ORDER — SIMVASTATIN 40 MG
TABLET ORAL
Qty: 90 TABLET | Refills: 1 | Status: SHIPPED | OUTPATIENT
Start: 2019-07-24 | End: 2020-05-27

## 2019-07-24 RX ORDER — LISINOPRIL 10 MG/1
10 TABLET ORAL DAILY
Qty: 90 TABLET | Refills: 0 | Status: SHIPPED | OUTPATIENT
Start: 2019-07-24 | End: 2019-11-11

## 2019-07-24 RX ORDER — PANTOPRAZOLE SODIUM 40 MG/1
40 TABLET, DELAYED RELEASE ORAL DAILY
Qty: 90 TABLET | Refills: 3 | Status: SHIPPED | OUTPATIENT
Start: 2019-07-24 | End: 2019-11-11

## 2019-07-24 RX ORDER — CODEINE PHOSPHATE AND GUAIFENESIN 10; 100 MG/5ML; MG/5ML
SOLUTION ORAL
Qty: 118 ML | Refills: 0 | Status: SHIPPED | OUTPATIENT
Start: 2019-07-24 | End: 2019-11-11

## 2019-07-24 RX ORDER — FENOFIBRATE 200 MG/1
200 CAPSULE ORAL DAILY
Qty: 90 CAPSULE | Refills: 1 | Status: SHIPPED | OUTPATIENT
Start: 2019-07-24 | End: 2020-01-13

## 2019-07-24 RX ORDER — AMOXICILLIN AND CLAVULANATE POTASSIUM 500; 125 MG/1; MG/1
1 TABLET, FILM COATED ORAL 2 TIMES DAILY
Qty: 20 TABLET | Refills: 0 | Status: SHIPPED | OUTPATIENT
Start: 2019-07-24 | End: 2019-08-03

## 2019-07-24 RX ORDER — HYDROXYCHLOROQUINE SULFATE 200 MG/1
200 TABLET, FILM COATED ORAL 2 TIMES DAILY
Qty: 180 TABLET | Refills: 2 | Status: SHIPPED | OUTPATIENT
Start: 2019-07-24 | End: 2020-03-16

## 2019-07-29 ENCOUNTER — OFFICE VISIT (OUTPATIENT)
Dept: FAMILY MEDICINE CLINIC | Facility: CLINIC | Age: 54
End: 2019-07-29
Payer: COMMERCIAL

## 2019-07-29 VITALS
OXYGEN SATURATION: 96 % | DIASTOLIC BLOOD PRESSURE: 80 MMHG | HEIGHT: 68 IN | BODY MASS INDEX: 31.52 KG/M2 | TEMPERATURE: 98 F | RESPIRATION RATE: 16 BRPM | SYSTOLIC BLOOD PRESSURE: 130 MMHG | HEART RATE: 83 BPM | WEIGHT: 208 LBS

## 2019-07-29 DIAGNOSIS — J40 BRONCHITIS: Primary | ICD-10-CM

## 2019-07-29 PROCEDURE — 94640 AIRWAY INHALATION TREATMENT: CPT | Performed by: FAMILY MEDICINE

## 2019-07-29 PROCEDURE — 99214 OFFICE O/P EST MOD 30 MIN: CPT | Performed by: FAMILY MEDICINE

## 2019-07-29 RX ORDER — ALBUTEROL SULFATE 90 UG/1
2 AEROSOL, METERED RESPIRATORY (INHALATION) EVERY 4 HOURS PRN
Qty: 1 INHALER | Refills: 2 | Status: SHIPPED | OUTPATIENT
Start: 2019-07-29 | End: 2019-11-11

## 2019-07-29 RX ORDER — ALBUTEROL SULFATE 2.5 MG/3ML
2.5 SOLUTION RESPIRATORY (INHALATION) ONCE
Status: COMPLETED | OUTPATIENT
Start: 2019-07-29 | End: 2019-07-29

## 2019-07-29 RX ADMIN — ALBUTEROL SULFATE 2.5 MG: 2.5 SOLUTION RESPIRATORY (INHALATION) at 13:36:00

## 2019-07-29 NOTE — PROGRESS NOTES
Patient presents with:  Cough: Here for f/u on the cough. Augmentin & Cheratuss was started on 7/24/19 but feels the cough has gotten worse. HPI:  Ceola Paget is a 48year old male who presents with a chief complaint of cough.  Onset gradual starting 1 cassia Rotator cuff tendonitis, left      Past Medical History:   Diagnosis Date   • Calculus of kidney    • Chronic cough    • Essential hypertension    • High cholesterol    • Hyperlipidemia    • Loss of appetite    • Night sweats    • Obstructive apnea 07/2 Pantoprazole Sodium 40 MG Oral Tab EC Take 1 tablet (40 mg total) by mouth daily. Before meal Disp: 90 tablet Rfl: 3   Hydroxychloroquine Sulfate 200 MG Oral Tab Take 1 tablet (200 mg total) by mouth 2 (two) times daily.  Disp: 180 tablet Rfl: 2   simvastat Abdominal: Soft. Bowel sounds are normal. Non tender, no masses, no organomegaly or hernias. Musculoskeletal: Normal range of motion. No edema and no tenderness. No effusions. Neurological: Normal reflexes. No cranial nerve deficit or sensory deficit.  Gayatri Castle You have a viral bronchitis. Bronchitis is inflammation and swelling of the lining of the lungs. This is often caused by an infection. Symptoms include a dry, hacking cough that is worse at night. The cough may bring up yellow-green mucus.  You may also fee · Over-the-counter cough, cold, and sore-throat medicines will not shorten the length of the illness, but they may help to reduce symptoms. Don't use decongestants if you have high blood pressure.   Follow-up care  Follow up with your healthcare provider, o

## 2019-08-12 NOTE — TELEPHONE ENCOUNTER
Requested Prescriptions     Pending Prescriptions Disp Refills   • ESOMEPRAZOLE MAGNESIUM 20 MG Oral Capsule Delayed Release [Pharmacy Med Name: ESOMEPRAZOLE MAG DR 20 MG CAP] 90 capsule 1     Sig: TAKE 1 CAPSULE BY MOUTH EVERY DAY IN THE MORNING BEFORE BR

## 2019-11-11 ENCOUNTER — OFFICE VISIT (OUTPATIENT)
Dept: FAMILY MEDICINE CLINIC | Facility: CLINIC | Age: 54
End: 2019-11-11
Payer: COMMERCIAL

## 2019-11-11 VITALS
DIASTOLIC BLOOD PRESSURE: 70 MMHG | HEART RATE: 82 BPM | BODY MASS INDEX: 32.01 KG/M2 | RESPIRATION RATE: 16 BRPM | HEIGHT: 68 IN | WEIGHT: 211.19 LBS | TEMPERATURE: 98 F | SYSTOLIC BLOOD PRESSURE: 127 MMHG

## 2019-11-11 DIAGNOSIS — R51.9 ACUTE NONINTRACTABLE HEADACHE, UNSPECIFIED HEADACHE TYPE: ICD-10-CM

## 2019-11-11 DIAGNOSIS — R25.3 EYE MUSCLE TWITCHES: Primary | ICD-10-CM

## 2019-11-11 PROCEDURE — 99214 OFFICE O/P EST MOD 30 MIN: CPT | Performed by: FAMILY MEDICINE

## 2019-11-11 NOTE — PROGRESS NOTES
HPI:   Jim Gallegos is a 48year old male that presents for Patient presents with:  Headache: Started 3 weeks ago. Left temporal headache. Itchiness: Underneath his eyes.     Over the past 3 wks, had some twitching of left lower eyelid and pain over le as of this encounter: 211 lb 3.2 oz (95.8 kg). Vital signs reviewed. Appears stated age, well groomed. Physical Exam:  GEN:  Patient is alert, awake and oriented, well developed, well nourished, no apparent distress.   HEENT:     Head:  Normocephalic, atr

## 2019-11-12 PROBLEM — R51.9 ACUTE NONINTRACTABLE HEADACHE: Status: ACTIVE | Noted: 2019-11-12

## 2019-11-12 PROBLEM — R25.3 EYE MUSCLE TWITCHES: Status: ACTIVE | Noted: 2019-11-12

## 2020-01-13 DIAGNOSIS — D86.9 SARCOID: ICD-10-CM

## 2020-01-13 DIAGNOSIS — E78.2 MIXED HYPERLIPIDEMIA: ICD-10-CM

## 2020-01-13 DIAGNOSIS — R10.30 LOWER ABDOMINAL PAIN: ICD-10-CM

## 2020-01-13 RX ORDER — FENOFIBRATE 200 MG/1
CAPSULE ORAL
Qty: 90 CAPSULE | Refills: 1 | Status: SHIPPED | OUTPATIENT
Start: 2020-01-13 | End: 2020-07-14

## 2020-01-13 NOTE — TELEPHONE ENCOUNTER
Name from pharmacy: FENOFIBRATE 200 MG CAPSULE          Will file in chart as: FENOFIBRATE MICRONIZED 200 MG Oral Cap         Sig: TAKE 1 CAPSULE BY MOUTH EVERY DAY    Disp:  90 capsule    Refills:  1    Start: 1/13/2020    Class: Normal    Non-formulary F

## 2020-02-05 ENCOUNTER — APPOINTMENT (OUTPATIENT)
Dept: GENERAL RADIOLOGY | Facility: HOSPITAL | Age: 55
End: 2020-02-05
Attending: EMERGENCY MEDICINE
Payer: COMMERCIAL

## 2020-02-05 ENCOUNTER — HOSPITAL ENCOUNTER (EMERGENCY)
Facility: HOSPITAL | Age: 55
Discharge: HOME | End: 2020-02-05
Attending: EMERGENCY MEDICINE
Payer: COMMERCIAL

## 2020-02-05 ENCOUNTER — OFFICE VISIT (OUTPATIENT)
Dept: FAMILY MEDICINE CLINIC | Facility: CLINIC | Age: 55
End: 2020-02-05
Payer: COMMERCIAL

## 2020-02-05 VITALS
BODY MASS INDEX: 32.28 KG/M2 | SYSTOLIC BLOOD PRESSURE: 146 MMHG | TEMPERATURE: 99 F | HEART RATE: 86 BPM | DIASTOLIC BLOOD PRESSURE: 90 MMHG | HEIGHT: 68 IN | OXYGEN SATURATION: 96 % | WEIGHT: 213 LBS | RESPIRATION RATE: 16 BRPM

## 2020-02-05 VITALS
SYSTOLIC BLOOD PRESSURE: 122 MMHG | HEART RATE: 84 BPM | BODY MASS INDEX: 32.31 KG/M2 | WEIGHT: 213.19 LBS | DIASTOLIC BLOOD PRESSURE: 82 MMHG | HEIGHT: 68 IN | OXYGEN SATURATION: 94 % | TEMPERATURE: 98 F | RESPIRATION RATE: 16 BRPM

## 2020-02-05 DIAGNOSIS — M79.10 MYALGIA: ICD-10-CM

## 2020-02-05 DIAGNOSIS — R06.2 WHEEZING: ICD-10-CM

## 2020-02-05 DIAGNOSIS — R06.00 DYSPNEA, UNSPECIFIED TYPE: Primary | ICD-10-CM

## 2020-02-05 DIAGNOSIS — D86.9 SARCOIDOSIS: ICD-10-CM

## 2020-02-05 DIAGNOSIS — R07.9 CHEST PAIN, UNSPECIFIED TYPE: ICD-10-CM

## 2020-02-05 DIAGNOSIS — R20.2 PARESTHESIAS: Primary | ICD-10-CM

## 2020-02-05 DIAGNOSIS — E83.42 HYPOMAGNESEMIA: ICD-10-CM

## 2020-02-05 DIAGNOSIS — M25.50 ARTHRALGIA, UNSPECIFIED JOINT: ICD-10-CM

## 2020-02-05 LAB
ALBUMIN SERPL-MCNC: 3.7 G/DL (ref 3.4–5)
ALBUMIN/GLOB SERPL: 1 {RATIO} (ref 1–2)
ALP LIVER SERPL-CCNC: 65 U/L (ref 45–117)
ALT SERPL-CCNC: 46 U/L (ref 16–61)
ANION GAP SERPL CALC-SCNC: 6 MMOL/L (ref 0–18)
AST SERPL-CCNC: 34 U/L (ref 15–37)
ATRIAL RATE: 83 BPM
BASOPHILS # BLD AUTO: 0.04 X10(3) UL (ref 0–0.2)
BASOPHILS NFR BLD AUTO: 0.4 %
BILIRUB SERPL-MCNC: 0.6 MG/DL (ref 0.1–2)
BUN BLD-MCNC: 14 MG/DL (ref 7–18)
BUN/CREAT SERPL: 12.8 (ref 10–20)
CALCIUM BLD-MCNC: 9.2 MG/DL (ref 8.5–10.1)
CHLORIDE SERPL-SCNC: 106 MMOL/L (ref 98–112)
CK SERPL-CCNC: 60 U/L (ref 39–308)
CO2 SERPL-SCNC: 25 MMOL/L (ref 21–32)
CREAT BLD-MCNC: 1.09 MG/DL (ref 0.7–1.3)
CRP SERPL-MCNC: 0.43 MG/DL (ref ?–0.3)
DEPRECATED RDW RBC AUTO: 41.4 FL (ref 35.1–46.3)
EOSINOPHIL # BLD AUTO: 0.28 X10(3) UL (ref 0–0.7)
EOSINOPHIL NFR BLD AUTO: 3.1 %
ERYTHROCYTE [DISTWIDTH] IN BLOOD BY AUTOMATED COUNT: 12.7 % (ref 11–15)
GLOBULIN PLAS-MCNC: 3.7 G/DL (ref 2.8–4.4)
GLUCOSE BLD-MCNC: 89 MG/DL (ref 70–99)
HAV IGM SER QL: 1.5 MG/DL (ref 1.6–2.6)
HCT VFR BLD AUTO: 45 % (ref 39–53)
HGB BLD-MCNC: 15.6 G/DL (ref 13–17.5)
IMM GRANULOCYTES # BLD AUTO: 0.03 X10(3) UL (ref 0–1)
IMM GRANULOCYTES NFR BLD: 0.3 %
LYMPHOCYTES # BLD AUTO: 2.13 X10(3) UL (ref 1–4)
LYMPHOCYTES NFR BLD AUTO: 23.4 %
M PROTEIN MFR SERPL ELPH: 7.4 G/DL (ref 6.4–8.2)
MCH RBC QN AUTO: 30.9 PG (ref 26–34)
MCHC RBC AUTO-ENTMCNC: 34.7 G/DL (ref 31–37)
MCV RBC AUTO: 89.1 FL (ref 80–100)
MONOCYTES # BLD AUTO: 0.62 X10(3) UL (ref 0.1–1)
MONOCYTES NFR BLD AUTO: 6.8 %
NEUTROPHILS # BLD AUTO: 5.99 X10 (3) UL (ref 1.5–7.7)
NEUTROPHILS # BLD AUTO: 5.99 X10(3) UL (ref 1.5–7.7)
NEUTROPHILS NFR BLD AUTO: 66 %
OSMOLALITY SERPL CALC.SUM OF ELEC: 284 MOSM/KG (ref 275–295)
P AXIS: 17 DEGREES
P-R INTERVAL: 148 MS
PLATELET # BLD AUTO: 201 10(3)UL (ref 150–450)
POTASSIUM SERPL-SCNC: 3.5 MMOL/L (ref 3.5–5.1)
Q-T INTERVAL: 404 MS
QRS DURATION: 86 MS
QTC CALCULATION (BEZET): 474 MS
R AXIS: -6 DEGREES
RBC # BLD AUTO: 5.05 X10(6)UL (ref 4.3–5.7)
SED RATE-ML: 15 MM/HR (ref 0–12)
SODIUM SERPL-SCNC: 137 MMOL/L (ref 136–145)
T AXIS: 17 DEGREES
TROPONIN I SERPL-MCNC: <0.045 NG/ML (ref ?–0.04)
VENTRICULAR RATE: 83 BPM
WBC # BLD AUTO: 9.1 X10(3) UL (ref 4–11)

## 2020-02-05 PROCEDURE — 99285 EMERGENCY DEPT VISIT HI MDM: CPT

## 2020-02-05 PROCEDURE — 84484 ASSAY OF TROPONIN QUANT: CPT | Performed by: EMERGENCY MEDICINE

## 2020-02-05 PROCEDURE — 85652 RBC SED RATE AUTOMATED: CPT | Performed by: EMERGENCY MEDICINE

## 2020-02-05 PROCEDURE — 96365 THER/PROPH/DIAG IV INF INIT: CPT

## 2020-02-05 PROCEDURE — 96361 HYDRATE IV INFUSION ADD-ON: CPT

## 2020-02-05 PROCEDURE — 82550 ASSAY OF CK (CPK): CPT | Performed by: EMERGENCY MEDICINE

## 2020-02-05 PROCEDURE — 93005 ELECTROCARDIOGRAM TRACING: CPT

## 2020-02-05 PROCEDURE — 93010 ELECTROCARDIOGRAM REPORT: CPT

## 2020-02-05 PROCEDURE — 96375 TX/PRO/DX INJ NEW DRUG ADDON: CPT

## 2020-02-05 PROCEDURE — 83735 ASSAY OF MAGNESIUM: CPT | Performed by: EMERGENCY MEDICINE

## 2020-02-05 PROCEDURE — 86140 C-REACTIVE PROTEIN: CPT | Performed by: EMERGENCY MEDICINE

## 2020-02-05 PROCEDURE — 71046 X-RAY EXAM CHEST 2 VIEWS: CPT | Performed by: EMERGENCY MEDICINE

## 2020-02-05 PROCEDURE — 80053 COMPREHEN METABOLIC PANEL: CPT | Performed by: EMERGENCY MEDICINE

## 2020-02-05 PROCEDURE — 85025 COMPLETE CBC W/AUTO DIFF WBC: CPT | Performed by: EMERGENCY MEDICINE

## 2020-02-05 PROCEDURE — 99214 OFFICE O/P EST MOD 30 MIN: CPT | Performed by: FAMILY MEDICINE

## 2020-02-05 RX ORDER — SODIUM CHLORIDE 9 MG/ML
125 INJECTION, SOLUTION INTRAVENOUS CONTINUOUS
Status: DISCONTINUED | OUTPATIENT
Start: 2020-02-05 | End: 2020-02-05

## 2020-02-05 RX ORDER — KETOROLAC TROMETHAMINE 30 MG/ML
30 INJECTION, SOLUTION INTRAMUSCULAR; INTRAVENOUS ONCE
Status: COMPLETED | OUTPATIENT
Start: 2020-02-05 | End: 2020-02-05

## 2020-02-05 RX ORDER — MAGNESIUM SULFATE 1 G/100ML
1 INJECTION INTRAVENOUS ONCE
Status: COMPLETED | OUTPATIENT
Start: 2020-02-05 | End: 2020-02-05

## 2020-02-05 NOTE — PROGRESS NOTES
.HPI:   Cayetano Virk is a 47year old male that presents for Patient presents with:  Joint Pain: From upper ext to lower extremety.  Started off with some numbness on both of his hands about 3 weeks ago then numbess and pain radiated all over his lower Pulse 84   Temp 97.9 °F (36.6 °C) (Oral)   Resp 16   Ht 68\"   Wt 213 lb 3.2 oz (96.7 kg)   SpO2 94%   BMI 32.42 kg/m²  Estimated body mass index is 32.42 kg/m² as calculated from the following:    Height as of this encounter: 68\".     Weight as of this en

## 2020-02-05 NOTE — ED PROVIDER NOTES
Patient Seen in: [facility] Emergency Department      History   Patient presents with:  Numbness Weakness    Stated Complaint: numbness all over body    HPI    Patient is a 51-year-old male who states that for the past 3 to 4 weeks he has had numbne use: Yes      Alcohol/week: 4.0 standard drinks      Types: 4 Standard drinks or equivalent per week      Comment: weekends    Drug use: No         Patient states he smokes a pack maybe every week.   Patient smoked maybe 15 years total.  Patient states he w (*)     All other components within normal limits   MAGNESIUM - Abnormal; Notable for the following components:    Magnesium 1.5 (*)     All other components within normal limits   COMP METABOLIC PANEL (14) - Normal   TROPONIN I - Normal   CK CREATINE TAIWO Impression:  Paresthesias  (primary encounter diagnosis)  Sarcoidosis  Hypomagnesemia    Disposition:  Discharge  2/5/2020  5:57 pm    Follow-up:  Jian Crowder MD  [address]  [address] Hinson, [address] [place] [address]  [phone]    In 2

## 2020-02-05 NOTE — ED INITIAL ASSESSMENT (HPI)
Pt to ED with c/o numbness to hands for 3 weeks and sts within the last week he has been feeling numbness throughout his entire body.

## 2020-02-06 NOTE — ED NOTES
DC instructions handed to pt. Family at bedside. No distress noted. Pt denies any further needs. Denies need for R Kae Brown 23 out of ER.  Sts ready to go home

## 2020-02-12 ENCOUNTER — TELEPHONE (OUTPATIENT)
Dept: FAMILY MEDICINE CLINIC | Facility: CLINIC | Age: 55
End: 2020-02-12

## 2020-02-12 NOTE — TELEPHONE ENCOUNTER
Received a refill request form for rx - Esomeprazole MG  20 CAP #90.  signed form and approved 3 refills. Form has been faxed over to [facility] pharmacy, received confirmation. Form has been send to scan.

## 2020-02-21 ENCOUNTER — HOSPITAL ENCOUNTER (OUTPATIENT)
Dept: CT IMAGING | Facility: HOSPITAL | Age: 55
Discharge: HOME | End: 2020-02-21
Attending: INTERNAL MEDICINE
Payer: COMMERCIAL

## 2020-02-21 ENCOUNTER — LAB ENCOUNTER (OUTPATIENT)
Dept: LAB | Facility: HOSPITAL | Age: 55
End: 2020-02-21
Attending: INTERNAL MEDICINE
Payer: COMMERCIAL

## 2020-02-21 ENCOUNTER — HOSPITAL ENCOUNTER (OUTPATIENT)
Dept: CV DIAGNOSTICS | Facility: HOSPITAL | Age: 55
Discharge: HOME | End: 2020-02-21
Attending: INTERNAL MEDICINE
Payer: COMMERCIAL

## 2020-02-21 DIAGNOSIS — D86.0 SARCOIDOSIS OF LUNG (HCC): ICD-10-CM

## 2020-02-21 LAB
ANA SCREEN: POSITIVE
CENTROMERE AUTOAB: <100 AU/ML (ref ?–100)
DSDNA AUTOAB: <100 IU/ML (ref ?–100)
HISTONE AUTOAB: <100 AU/ML (ref ?–100)
JO-1 AUTOAB: <100 AU/ML (ref ?–100)
RHEUMATOID FACT SERPL-ACNC: <10 IU/ML (ref ?–15)
RNP AUTOAB: <100 AU/ML (ref ?–100)
SCL-70 AUTOAB: <100 AU/ML (ref ?–100)
SM AUTOAB (SMITH): <100 AU/ML (ref ?–100)
SSA AUTOAB: <100 AU/ML (ref ?–100)
SSB AUTOAB: <100 AU/ML (ref ?–100)

## 2020-02-21 PROCEDURE — 86038 ANTINUCLEAR ANTIBODIES: CPT

## 2020-02-21 PROCEDURE — 86431 RHEUMATOID FACTOR QUANT: CPT

## 2020-02-21 PROCEDURE — 86235 NUCLEAR ANTIGEN ANTIBODY: CPT

## 2020-02-21 PROCEDURE — 71260 CT THORAX DX C+: CPT | Performed by: INTERNAL MEDICINE

## 2020-02-21 PROCEDURE — 82164 ANGIOTENSIN I ENZYME TEST: CPT

## 2020-02-21 PROCEDURE — 93306 TTE W/DOPPLER COMPLETE: CPT | Performed by: INTERNAL MEDICINE

## 2020-02-21 PROCEDURE — 36415 COLL VENOUS BLD VENIPUNCTURE: CPT

## 2020-02-21 PROCEDURE — 86225 DNA ANTIBODY NATIVE: CPT

## 2020-02-23 LAB — ANGIOTENSIN CONVERTING ENZYME: 48 U/L

## 2020-02-24 NOTE — PROGRESS NOTES
I looked at the CT chest.  There is a haziness (ground glass) in his lungs - likely consistent with lasting effects of sarcoidosis. This is unchanged in appearance compared to about 2 yrs ago indicating some stablility.   The lymph nodes in the center of h

## 2020-02-24 NOTE — PROGRESS NOTES
The echo looks good - normal heart function. There is a mild elevation in pulmonary pressures. This is unchanged from the Echo done in Fall at [place]. No signs of cardiomyopathy recurrence or worsening pulm HTN.  Call with other results as well - see prior

## 2020-02-26 NOTE — PROGRESS NOTES
Patient notified of results, verbalizes understanding. All questions answered. Provided patient with Dr. Angie Alba contact information.

## 2020-04-06 ENCOUNTER — TELEPHONE (OUTPATIENT)
Dept: FAMILY MEDICINE CLINIC | Facility: CLINIC | Age: 55
End: 2020-04-06

## 2020-04-06 ENCOUNTER — HOSPITAL ENCOUNTER (EMERGENCY)
Facility: HOSPITAL | Age: 55
Discharge: HOME | End: 2020-04-06
Attending: EMERGENCY MEDICINE
Payer: COMMERCIAL

## 2020-04-06 ENCOUNTER — APPOINTMENT (OUTPATIENT)
Dept: CT IMAGING | Facility: HOSPITAL | Age: 55
End: 2020-04-06
Attending: NURSE PRACTITIONER
Payer: COMMERCIAL

## 2020-04-06 VITALS
DIASTOLIC BLOOD PRESSURE: 79 MMHG | OXYGEN SATURATION: 98 % | HEART RATE: 78 BPM | RESPIRATION RATE: 18 BRPM | WEIGHT: 210 LBS | SYSTOLIC BLOOD PRESSURE: 121 MMHG | BODY MASS INDEX: 31.83 KG/M2 | HEIGHT: 68 IN | TEMPERATURE: 97 F

## 2020-04-06 DIAGNOSIS — R05.9 COUGH: Primary | ICD-10-CM

## 2020-04-06 DIAGNOSIS — D86.0 SARCOIDOSIS OF LUNG (HCC): ICD-10-CM

## 2020-04-06 DIAGNOSIS — J22 LOWER RESPIRATORY INFECTION: Primary | ICD-10-CM

## 2020-04-06 DIAGNOSIS — R06.00 DYSPNEA, UNSPECIFIED TYPE: ICD-10-CM

## 2020-04-06 PROCEDURE — 99285 EMERGENCY DEPT VISIT HI MDM: CPT

## 2020-04-06 PROCEDURE — 84484 ASSAY OF TROPONIN QUANT: CPT | Performed by: NURSE PRACTITIONER

## 2020-04-06 PROCEDURE — 99213 OFFICE O/P EST LOW 20 MIN: CPT | Performed by: FAMILY MEDICINE

## 2020-04-06 PROCEDURE — 93005 ELECTROCARDIOGRAM TRACING: CPT

## 2020-04-06 PROCEDURE — 93010 ELECTROCARDIOGRAM REPORT: CPT

## 2020-04-06 PROCEDURE — 87999 UNLISTED MICROBIOLOGY PX: CPT

## 2020-04-06 PROCEDURE — 80053 COMPREHEN METABOLIC PANEL: CPT | Performed by: NURSE PRACTITIONER

## 2020-04-06 PROCEDURE — 36415 COLL VENOUS BLD VENIPUNCTURE: CPT

## 2020-04-06 PROCEDURE — 71275 CT ANGIOGRAPHY CHEST: CPT | Performed by: NURSE PRACTITIONER

## 2020-04-06 PROCEDURE — 85025 COMPLETE CBC W/AUTO DIFF WBC: CPT | Performed by: NURSE PRACTITIONER

## 2020-04-06 RX ORDER — BENZONATATE 100 MG/1
100 CAPSULE ORAL 3 TIMES DAILY PRN
Qty: 30 CAPSULE | Refills: 0 | Status: SHIPPED | OUTPATIENT
Start: 2020-04-06 | End: 2020-05-06

## 2020-04-06 RX ORDER — POTASSIUM CHLORIDE 20 MEQ/1
40 TABLET, EXTENDED RELEASE ORAL ONCE
Status: COMPLETED | OUTPATIENT
Start: 2020-04-06 | End: 2020-04-06

## 2020-04-06 NOTE — ED INITIAL ASSESSMENT (HPI)
Patient has sarcoidosis of the lungs with increased dry cough over the last couple of weeks. Sent here from [address] [place] [address] [facility] for COVID testing. Patient does not have thermometer but has been having chills and SOB.

## 2020-04-06 NOTE — TELEPHONE ENCOUNTER
Called patient and spoke to him. Patient had positive exposure to multiple people who had positive coronavirus and possible culprit. Patient does have cough some congestion but mostly dry eye denies fever. However he does have shortness of breath.   Melissa

## 2020-04-06 NOTE — ED PROVIDER NOTES
Patient Seen in: [facility] Emergency Department      History   Patient presents with:  Cough/URI    Stated Complaint: sob, cough    HPI  46 yo male with history of sarcoidosis who is on Azithromycin and Hydroxychloroquine presents with 2 weeks of c stated complaint: sob, cough  Other systems are as noted in HPI. Constitutional and vital signs reviewed. All other systems reviewed and negative except as noted above.     Physical Exam     ED Triage Vitals [04/06/20 1542]   BP (!) 141/111   Pulse 10 Judgment: Judgment normal.           ED Course     Labs Reviewed   COMP METABOLIC PANEL (14) - Abnormal; Notable for the following components:       Result Value    Potassium 3.4 (*)     BUN 19 (*)     Creatinine 1.44 (*)     GFR, Non- 55 FINDINGS:  VASCULATURE:  No acute pulmonary embolus to the segmental level. Aorta is normal in caliber. No evidence of aneurysm or dissection. Minimal atheromatous calcifications of the aorta. Normal 3 vessel arch.  LUNGS:  Emphysematous changes bilater most likely with viral syndrome and will continue his z-paco and hydroxychloroquine with close follow up and explicit return precautions. Pt agrees with d/c and return precautions. He feels comfortable with discharge.   Return to work restrictions provided

## 2020-04-06 NOTE — TELEPHONE ENCOUNTER
See attached phone message. 3/31/20 Letter per Dr. Olegario Ren called [facility] for possible COVID testing, was given telemedicine appointment.      2/5/20 OV Dr. Ruth Tellez Dyspnea/myalgia/arthralgia/wheezing/chest pain TO ED-  2/

## 2020-04-06 NOTE — TELEPHONE ENCOUNTER
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 Patient has a note for work for one week, but his employer gave him two weeks off. He will need another note for work for this week.  He has a cough, had SOB, Patient state

## 2020-04-08 ENCOUNTER — TELEPHONE (OUTPATIENT)
Dept: FAMILY MEDICINE CLINIC | Facility: CLINIC | Age: 55
End: 2020-04-08

## 2020-04-08 NOTE — TELEPHONE ENCOUNTER
Called pt for update, he reports COVID-19 testing Negative, he has finished the Z-pack, still on the Hydroxychloroquine, using the Benzonatate.    He reports:  +cough continues, non productive  +SOB, \"but has improved\"  +body aches on and off  +runny nose

## 2020-04-08 NOTE — TELEPHONE ENCOUNTER
[id number] Kathy Gracia, please have patient give me progress report on 4/13/20 to see if he is ready to go back to work on 4/14/20. Thanks so much.

## 2020-04-13 ENCOUNTER — VIRTUAL PHONE E/M (OUTPATIENT)
Dept: FAMILY MEDICINE CLINIC | Facility: CLINIC | Age: 55
End: 2020-04-13
Payer: COMMERCIAL

## 2020-04-13 DIAGNOSIS — R05.9 COUGH: Primary | ICD-10-CM

## 2020-04-13 DIAGNOSIS — D86.0 SARCOIDOSIS OF LUNG (HCC): ICD-10-CM

## 2020-04-13 DIAGNOSIS — R06.00 DYSPNEA, UNSPECIFIED TYPE: ICD-10-CM

## 2020-04-13 PROCEDURE — 99213 OFFICE O/P EST LOW 20 MIN: CPT | Performed by: FAMILY MEDICINE

## 2020-04-13 NOTE — PROGRESS NOTES
This visit is conducted using Telemedicine with live, interactive video and/or audio. Verification of patient identity was established by the  patient (s)  Maurie Dance   verbally consents to a telemedicine visit. Disp: 30 capsule, Rfl: 0  •  azithromycin 250 MG Oral Tab, 500mg today then 250mg x4 days, Disp: 6 tablet, Rfl: 0  •  Hydroxychloroquine Sulfate 200 MG Oral Tab, Take 1 tablet (200 mg total) by mouth 2 (two) times daily. , Disp: 180 tablet, Rfl: 1  •  FENOF

## 2020-04-20 ENCOUNTER — VIRTUAL PHONE E/M (OUTPATIENT)
Dept: FAMILY MEDICINE CLINIC | Facility: CLINIC | Age: 55
End: 2020-04-20
Payer: COMMERCIAL

## 2020-04-20 DIAGNOSIS — R05.9 COUGH: Primary | ICD-10-CM

## 2020-04-20 PROCEDURE — 99213 OFFICE O/P EST LOW 20 MIN: CPT | Performed by: FAMILY MEDICINE

## 2020-04-20 RX ORDER — ALBUTEROL SULFATE 90 UG/1
2 AEROSOL, METERED RESPIRATORY (INHALATION)
Qty: 1 INHALER | Refills: 0 | Status: SHIPPED | OUTPATIENT
Start: 2020-04-20 | End: 2020-05-27 | Stop reason: ALTCHOICE

## 2020-04-20 NOTE — PROGRESS NOTES
TELEMEDICINE VISIT by phone  This visit is conducted using Telemedicine with live, interactive video and/or audio.     Verification of patient identity was established by the  patient (s)  Lo Shepherd verbally consents t (100 mg total) by mouth 3 (three) times daily as needed for cough. , Disp: 30 capsule, Rfl: 0  •  azithromycin 250 MG Oral Tab, 500mg today then 250mg x4 days, Disp: 6 tablet, Rfl: 0  •  Hydroxychloroquine Sulfate 200 MG Oral Tab, Take 1 tablet (200 mg tota relationship, due to the ongoing public health crisis/national emergency and because of restrictions of visitation. There are limitations of this visit as no physical exam could be performed.   Every conscious effort was taken to allow for sufficient and a

## 2020-04-27 ENCOUNTER — VIRTUAL PHONE E/M (OUTPATIENT)
Dept: FAMILY MEDICINE CLINIC | Facility: CLINIC | Age: 55
End: 2020-04-27
Payer: COMMERCIAL

## 2020-04-27 DIAGNOSIS — D86.0 SARCOIDOSIS OF LUNG (HCC): ICD-10-CM

## 2020-04-27 DIAGNOSIS — R06.00 DYSPNEA, UNSPECIFIED TYPE: ICD-10-CM

## 2020-04-27 DIAGNOSIS — R05.8 PRODUCTIVE COUGH: Primary | ICD-10-CM

## 2020-04-27 PROCEDURE — 99214 OFFICE O/P EST MOD 30 MIN: CPT | Performed by: FAMILY MEDICINE

## 2020-04-27 RX ORDER — AMOXICILLIN AND CLAVULANATE POTASSIUM 875; 125 MG/1; MG/1
1 TABLET, FILM COATED ORAL 2 TIMES DAILY
Qty: 20 TABLET | Refills: 0 | Status: SHIPPED | OUTPATIENT
Start: 2020-04-27 | End: 2020-05-07

## 2020-04-27 NOTE — PROGRESS NOTES
TELEMEDICINE VISIT by phone  This visit is conducted using Telemedicine with live, interactive video and/or audio.     Verification of patient identity was established by the  patient (s)  Maurie Dance verbally consents t every 4 to 6 hours as needed for Wheezing or Shortness of Breath., Disp: 1 Inhaler, Rfl: 0  •  benzonatate 100 MG Oral Cap, Take 1 capsule (100 mg total) by mouth 3 (three) times daily as needed for cough. , Disp: 30 capsule, Rfl: 0  •  azithromycin 250 MG 4/27/2020  9:59 AM    Total Time Spent  12  minutes    Please note that the following visit was completed using two-way, real-time interactive audio and/or video communication.   This has been done in good jere to provide continuity of care in the best i

## 2020-04-30 ENCOUNTER — TELEPHONE (OUTPATIENT)
Dept: FAMILY MEDICINE CLINIC | Facility: CLINIC | Age: 55
End: 2020-04-30

## 2020-04-30 NOTE — TELEPHONE ENCOUNTER
Future Appointments   Date Time Provider Tiffanie Do   5/4/2020 10:00 AM Jason Chandler MD [facility] [address] [facility] [address]   9/21/2020  9:30 AM Benji Peterson MD [facility]     Patient verbally consents to a virtual/telephone check-in service for t

## 2020-05-04 ENCOUNTER — TELEPHONE (OUTPATIENT)
Dept: FAMILY MEDICINE CLINIC | Facility: CLINIC | Age: 55
End: 2020-05-04

## 2020-05-04 ENCOUNTER — VIRTUAL PHONE E/M (OUTPATIENT)
Dept: FAMILY MEDICINE CLINIC | Facility: CLINIC | Age: 55
End: 2020-05-04
Payer: COMMERCIAL

## 2020-05-04 DIAGNOSIS — D86.0 PULMONARY SARCOIDOSIS (HCC): ICD-10-CM

## 2020-05-04 DIAGNOSIS — R05.9 COUGH: Primary | ICD-10-CM

## 2020-05-04 PROCEDURE — 99214 OFFICE O/P EST MOD 30 MIN: CPT | Performed by: FAMILY MEDICINE

## 2020-05-04 NOTE — PROGRESS NOTES
TELEMEDICINE VISIT by phone  This visit is conducted using Telemedicine with live, interactive video and/or audio.     Verification of patient identity was established by the  patient (s)  Destin Snell verbally consents t 4 to 6 hours as needed for Wheezing or Shortness of Breath., Disp: 1 Inhaler, Rfl: 0  •  benzonatate 100 MG Oral Cap, Take 1 capsule (100 mg total) by mouth 3 (three) times daily as needed for cough. , Disp: 30 capsule, Rfl: 0  •  azithromycin 250 MG Oral T visit was completed using two-way, real-time interactive audio and/or video communication.   This has been done in good jere to provide continuity of care in the best interest of the provider-patient relationship, due to the ongoing public health crisis/na

## 2020-05-04 NOTE — TELEPHONE ENCOUNTER
- We cannot email. I will contact patient to see if we can fax for him. Pt will call back to give a fax number. Christian Ortiz

## 2020-05-04 NOTE — TELEPHONE ENCOUNTER
I put all work letter in patient's my chart however he was requesting if he could also send that to his personal email and he is not able to print it out from my chart for some reason in [employer] to his work.     Do you know if we can send a letter to his per

## 2020-05-07 ENCOUNTER — TELEPHONE (OUTPATIENT)
Dept: FAMILY MEDICINE CLINIC | Facility: CLINIC | Age: 55
End: 2020-05-07

## 2020-05-07 NOTE — TELEPHONE ENCOUNTER
Future Appointments   Date Time Provider Tiffanie Ernestina   5/15/2020  9:00 AM Ale Noland MD [facility] [address] [facility] [address]   9/21/2020  9:30 AM Junior Peterson MD [facility]     Patient verbally consents to a virtual/telephone check-in service for

## 2020-05-15 ENCOUNTER — TELEPHONE (OUTPATIENT)
Dept: FAMILY MEDICINE CLINIC | Facility: CLINIC | Age: 55
End: 2020-05-15

## 2020-05-15 ENCOUNTER — VIRTUAL PHONE E/M (OUTPATIENT)
Dept: FAMILY MEDICINE CLINIC | Facility: CLINIC | Age: 55
End: 2020-05-15
Payer: COMMERCIAL

## 2020-05-15 DIAGNOSIS — R05.9 COUGH: ICD-10-CM

## 2020-05-15 DIAGNOSIS — D86.0 SARCOIDOSIS OF LUNG (HCC): Primary | ICD-10-CM

## 2020-05-15 DIAGNOSIS — R06.00 DYSPNEA, UNSPECIFIED TYPE: ICD-10-CM

## 2020-05-15 PROCEDURE — 99214 OFFICE O/P EST MOD 30 MIN: CPT | Performed by: FAMILY MEDICINE

## 2020-05-15 NOTE — TELEPHONE ENCOUNTER
Future Appointments   Date Time Provider Tiffanie Do   5/29/2020  9:00 AM Aileen Perez MD [facility] [address] [facility] [address]   9/21/2020  9:30 AM Kate Peterson MD [facility]     Patient verbally consents to a virtual/telephone check-in service for the

## 2020-05-15 NOTE — PROGRESS NOTES
TELEMEDICINE VISIT by phone  This visit is conducted using Telemedicine with live, interactive video and/or audio.     Verification of patient identity was established by the  patient (s)  Tyler Moore verbally consents t lungs 2 (two) times daily. , Disp: 1 each, Rfl: 6  •  Albuterol Sulfate HFA (PROAIR HFA) 108 (90 Base) MCG/ACT Inhalation Aero Soln, Inhale 2 puffs into the lungs every 4 to 6 hours as needed for Wheezing or Shortness of Breath., Disp: 1 Inhaler, Rfl: 0  • plan.      No follow-ups on file. Sha Ochoa M.D. Family Medicine   5/15/2020  9:17 AM    Total Time Spent  13  minutes    Please note that the following visit was completed using two-way, real-time interactive audio and/or video communication.   Keith Diaz

## 2020-05-15 NOTE — TELEPHONE ENCOUNTER
Pt looking for updated work note. Phone visit today  ASSESSMENT AND PLAN:    1. Sarcoidosis of lung ([address]  2. Dyspnea, unspecified type  3.  Cough  Due to his underlying lung disease and current symptoms he is at high risk of worsening problems and shoul

## 2020-05-15 NOTE — TELEPHONE ENCOUNTER
Miguelito Macias- Pt states he is needing a note to extend two week time off from work May 18th thru June 1st and to return on the 2nd of June. Pt had a phone visit today. Please advise. [phone] with any questions.

## 2020-05-18 ENCOUNTER — TELEPHONE (OUTPATIENT)
Dept: FAMILY MEDICINE CLINIC | Facility: CLINIC | Age: 55
End: 2020-05-18

## 2020-05-27 RX ORDER — HYDROCHLOROTHIAZIDE 25 MG/1
25 TABLET ORAL DAILY
Status: ON HOLD | COMMUNITY
End: 2021-03-04

## 2020-05-27 RX ORDER — SIMVASTATIN 40 MG
TABLET ORAL
Qty: 90 TABLET | Refills: 1 | Status: SHIPPED | OUTPATIENT
Start: 2020-05-27 | End: 2020-12-21

## 2020-05-27 NOTE — TELEPHONE ENCOUNTER
Cholesterol Medication Protocol Passed5/27 12:34 AM   ALT < 80    ALT resulted within past year    Lipid panel within past 12 months    Appointment within past 12 or next 3 months     Requesting SIMVASTATIN 40 MG   LOV: 2/5/20  RTC: 3 months  Last Relevant

## 2020-05-28 ENCOUNTER — TELEPHONE (OUTPATIENT)
Dept: FAMILY MEDICINE CLINIC | Facility: CLINIC | Age: 55
End: 2020-05-28

## 2020-05-28 NOTE — TELEPHONE ENCOUNTER
Esomeprazole 20mg is not covered under patient insurance, alternatives are lansoprazole 30mg, rabeprazole 20mg, omeprazole 20mg. Please prescribe alternative if appropriate.  Thank you

## 2020-05-29 ENCOUNTER — LAB ENCOUNTER (OUTPATIENT)
Dept: LAB | Facility: HOSPITAL | Age: 55
End: 2020-05-29
Attending: INTERNAL MEDICINE
Payer: COMMERCIAL

## 2020-05-29 ENCOUNTER — VIRTUAL PHONE E/M (OUTPATIENT)
Dept: FAMILY MEDICINE CLINIC | Facility: CLINIC | Age: 55
End: 2020-05-29
Payer: COMMERCIAL

## 2020-05-29 DIAGNOSIS — R05.9 COUGH: Primary | ICD-10-CM

## 2020-05-29 DIAGNOSIS — R06.00 DYSPNEA, UNSPECIFIED TYPE: ICD-10-CM

## 2020-05-29 DIAGNOSIS — D86.0 SARCOIDOSIS OF LUNG (HCC): ICD-10-CM

## 2020-05-29 DIAGNOSIS — R93.89 ABNORMAL COMPUTED TOMOGRAPHY ANGIOGRAPHY (CTA): ICD-10-CM

## 2020-05-29 PROCEDURE — 99213 OFFICE O/P EST LOW 20 MIN: CPT | Performed by: FAMILY MEDICINE

## 2020-05-29 RX ORDER — RABEPRAZOLE SODIUM 20 MG/1
20 TABLET, DELAYED RELEASE ORAL DAILY
Qty: 30 TABLET | Refills: 0 | Status: SHIPPED | OUTPATIENT
Start: 2020-05-29 | End: 2020-06-24

## 2020-05-29 NOTE — PROGRESS NOTES
TELEMEDICINE VISIT by phone  This visit is conducted using Telemedicine with live, interactive audio.      Verification of patient identity was established by the  patient (s)  Destin Snell verbally consents to a telemedi each, Rfl: 6  •  Hydroxychloroquine Sulfate 200 MG Oral Tab, Take 1 tablet (200 mg total) by mouth 2 (two) times daily. , Disp: 180 tablet, Rfl: 1  •  FENOFIBRATE MICRONIZED 200 MG Oral Cap, TAKE 1 CAPSULE BY MOUTH EVERY DAY, Disp: 90 capsule, Rfl: 1  •  ES billing was spent on reviewing labs, medications, radiology tests and decision making. Appropriate medical decision-making and tests are ordered as detailed in the plan of care above.

## 2020-05-29 NOTE — TELEPHONE ENCOUNTER
RABEprazole Sodium 20 MG Oral Tab EC 30 tablet 0 5/29/2020     Sig - Route:  Take 1 tablet (20 mg total) by mouth daily. - Oral    Sent to pharmacy as: RABEprazole Sodium 20 MG Oral Tablet Delayed Release (ACIPHEX)    E-Prescribing Status: Receipt confirmed

## 2020-06-01 ENCOUNTER — TELEPHONE (OUTPATIENT)
Dept: FAMILY MEDICINE CLINIC | Facility: CLINIC | Age: 55
End: 2020-06-01

## 2020-06-01 ENCOUNTER — ANESTHESIA (OUTPATIENT)
Dept: ENDOSCOPY | Facility: HOSPITAL | Age: 55
End: 2020-06-01
Payer: COMMERCIAL

## 2020-06-01 ENCOUNTER — APPOINTMENT (OUTPATIENT)
Dept: GENERAL RADIOLOGY | Facility: HOSPITAL | Age: 55
End: 2020-06-01
Attending: INTERNAL MEDICINE
Payer: COMMERCIAL

## 2020-06-01 ENCOUNTER — ANESTHESIA EVENT (OUTPATIENT)
Dept: ENDOSCOPY | Facility: HOSPITAL | Age: 55
End: 2020-06-01
Payer: COMMERCIAL

## 2020-06-01 ENCOUNTER — HOSPITAL ENCOUNTER (OUTPATIENT)
Facility: HOSPITAL | Age: 55
Setting detail: HOSPITAL OUTPATIENT SURGERY
Discharge: HOME | End: 2020-06-01
Attending: INTERNAL MEDICINE | Admitting: INTERNAL MEDICINE
Payer: COMMERCIAL

## 2020-06-01 VITALS
HEIGHT: 68 IN | HEART RATE: 69 BPM | WEIGHT: 210 LBS | DIASTOLIC BLOOD PRESSURE: 70 MMHG | TEMPERATURE: 98 F | BODY MASS INDEX: 31.83 KG/M2 | OXYGEN SATURATION: 97 % | RESPIRATION RATE: 16 BRPM | SYSTOLIC BLOOD PRESSURE: 102 MMHG

## 2020-06-01 DIAGNOSIS — R93.89 ABNORMAL COMPUTED TOMOGRAPHY ANGIOGRAPHY (CTA): Primary | ICD-10-CM

## 2020-06-01 PROCEDURE — 87102 FUNGUS ISOLATION CULTURE: CPT | Performed by: INTERNAL MEDICINE

## 2020-06-01 PROCEDURE — 87205 SMEAR GRAM STAIN: CPT | Performed by: INTERNAL MEDICINE

## 2020-06-01 PROCEDURE — 87556 M.TUBERCULO DNA AMP PROBE: CPT | Performed by: INTERNAL MEDICINE

## 2020-06-01 PROCEDURE — 87206 SMEAR FLUORESCENT/ACID STAI: CPT | Performed by: INTERNAL MEDICINE

## 2020-06-01 PROCEDURE — 87071 CULTURE AEROBIC QUANT OTHER: CPT | Performed by: INTERNAL MEDICINE

## 2020-06-01 PROCEDURE — 87798 DETECT AGENT NOS DNA AMP: CPT | Performed by: INTERNAL MEDICINE

## 2020-06-01 PROCEDURE — 88104 CYTOPATH FL NONGYN SMEARS: CPT | Performed by: INTERNAL MEDICINE

## 2020-06-01 PROCEDURE — 88312 SPECIAL STAINS GROUP 1: CPT | Performed by: INTERNAL MEDICINE

## 2020-06-01 PROCEDURE — 0B9G8ZX DRAINAGE OF LEFT UPPER LUNG LOBE, VIA NATURAL OR ARTIFICIAL OPENING ENDOSCOPIC, DIAGNOSTIC: ICD-10-PCS | Performed by: INTERNAL MEDICINE

## 2020-06-01 PROCEDURE — 89050 BODY FLUID CELL COUNT: CPT | Performed by: INTERNAL MEDICINE

## 2020-06-01 PROCEDURE — 0BJ08ZZ INSPECTION OF TRACHEOBRONCHIAL TREE, VIA NATURAL OR ARTIFICIAL OPENING ENDOSCOPIC: ICD-10-PCS | Performed by: INTERNAL MEDICINE

## 2020-06-01 PROCEDURE — 71045 X-RAY EXAM CHEST 1 VIEW: CPT | Performed by: INTERNAL MEDICINE

## 2020-06-01 PROCEDURE — 87305 ASPERGILLUS AG IA: CPT | Performed by: INTERNAL MEDICINE

## 2020-06-01 PROCEDURE — 0BBJ8ZX EXCISION OF LEFT LOWER LUNG LOBE, VIA NATURAL OR ARTIFICIAL OPENING ENDOSCOPIC, DIAGNOSTIC: ICD-10-PCS | Performed by: INTERNAL MEDICINE

## 2020-06-01 PROCEDURE — 88305 TISSUE EXAM BY PATHOLOGIST: CPT | Performed by: INTERNAL MEDICINE

## 2020-06-01 PROCEDURE — 87116 MYCOBACTERIA CULTURE: CPT | Performed by: INTERNAL MEDICINE

## 2020-06-01 PROCEDURE — 89051 BODY FLUID CELL COUNT: CPT | Performed by: INTERNAL MEDICINE

## 2020-06-01 RX ORDER — SODIUM CHLORIDE, SODIUM LACTATE, POTASSIUM CHLORIDE, CALCIUM CHLORIDE 600; 310; 30; 20 MG/100ML; MG/100ML; MG/100ML; MG/100ML
INJECTION, SOLUTION INTRAVENOUS CONTINUOUS
Status: DISCONTINUED | OUTPATIENT
Start: 2020-06-01 | End: 2020-06-01

## 2020-06-01 RX ORDER — DEXMEDETOMIDINE HYDROCHLORIDE 4 UG/ML
INJECTION, SOLUTION INTRAVENOUS CONTINUOUS PRN
Status: DISCONTINUED | OUTPATIENT
Start: 2020-06-01 | End: 2020-06-01 | Stop reason: SURG

## 2020-06-01 RX ORDER — MIDAZOLAM HYDROCHLORIDE 1 MG/ML
INJECTION INTRAMUSCULAR; INTRAVENOUS AS NEEDED
Status: DISCONTINUED | OUTPATIENT
Start: 2020-06-01 | End: 2020-06-01 | Stop reason: SURG

## 2020-06-01 RX ORDER — LIDOCAINE HYDROCHLORIDE 10 MG/ML
INJECTION, SOLUTION EPIDURAL; INFILTRATION; INTRACAUDAL; PERINEURAL AS NEEDED
Status: DISCONTINUED | OUTPATIENT
Start: 2020-06-01 | End: 2020-06-01 | Stop reason: SURG

## 2020-06-01 RX ORDER — NALOXONE HYDROCHLORIDE 0.4 MG/ML
80 INJECTION, SOLUTION INTRAMUSCULAR; INTRAVENOUS; SUBCUTANEOUS AS NEEDED
Status: DISCONTINUED | OUTPATIENT
Start: 2020-06-01 | End: 2020-06-01

## 2020-06-01 RX ORDER — KETAMINE HYDROCHLORIDE 50 MG/ML
INJECTION, SOLUTION, CONCENTRATE INTRAMUSCULAR; INTRAVENOUS AS NEEDED
Status: DISCONTINUED | OUTPATIENT
Start: 2020-06-01 | End: 2020-06-01 | Stop reason: SURG

## 2020-06-01 RX ADMIN — DEXMEDETOMIDINE HYDROCHLORIDE 1 MCG/KG/HR: 4 INJECTION, SOLUTION INTRAVENOUS at 08:25:00

## 2020-06-01 RX ADMIN — LIDOCAINE HYDROCHLORIDE 50 MG: 10 INJECTION, SOLUTION EPIDURAL; INFILTRATION; INTRACAUDAL; PERINEURAL at 08:14:00

## 2020-06-01 RX ADMIN — KETAMINE HYDROCHLORIDE 20 MG: 50 INJECTION, SOLUTION, CONCENTRATE INTRAMUSCULAR; INTRAVENOUS at 08:14:00

## 2020-06-01 RX ADMIN — DEXMEDETOMIDINE HYDROCHLORIDE 0.6 MCG/KG/HR: 4 INJECTION, SOLUTION INTRAVENOUS at 08:16:00

## 2020-06-01 RX ADMIN — MIDAZOLAM HYDROCHLORIDE 2 MG: 1 INJECTION INTRAMUSCULAR; INTRAVENOUS at 08:14:00

## 2020-06-01 RX ADMIN — SODIUM CHLORIDE, SODIUM LACTATE, POTASSIUM CHLORIDE, CALCIUM CHLORIDE: 600; 310; 30; 20 INJECTION, SOLUTION INTRAVENOUS at 08:48:00

## 2020-06-01 NOTE — H&P
Please see full H&P from recent clinic visit on 5/26/20 with Dr. Zulma Frey. No changes in signs and symptoms since that time. Plan for bronchoscopy with BAL and TBBx under fluoro and with MAC.  The rationale for performing the bronchoscopy, including risks

## 2020-06-01 NOTE — TELEPHONE ENCOUNTER
Future Appointments   Date Time Provider Tiffanie Ernestina   6/12/2020 11:30 AM Delfina Still MD [facility] [address]   9/21/2020  9:30 AM Deya Peterson MD [facility]     Patient verbally consents to a virtual/telephone check-in service for 06/

## 2020-06-01 NOTE — ANESTHESIA POSTPROCEDURE EVALUATION
[id number] [facility] [address] [address] Patient Status:  H[facility] Out[facility]   Age/Gender 47year old male MRN [id number]   [facility] ENDOSCOPY Attending Tomy Miller MD   Hosp Day # 0 PCP Noble Chavarria MD       Anesthesia Post

## 2020-06-01 NOTE — ANESTHESIA PREPROCEDURE EVALUATION
PRE-OP EVALUATION    Patient Name: Sonido Kole    Pre-op Diagnosis: ABNORMAL CTA    Procedure(s):  BRONCHOSCOPY WITH BRONCHIAL ALVEOLAR LAVAGE AND TRANSBRONCHIAL BIOPSY    Surgeon(s) and Role:     * Marcio Tsang MD - Primary    Pre-op vitals Surgical History:   Procedure Laterality Date   • COLONOSCOPY     • COLONOSCOPY  11/2015    4 small adeonomas, diverticulosis. repeat 2018   • EGD  11/2015     schatzki's ring, non obstructive. Erosive esophagitis, LA grad 1 (no barretts on path).  Nadiya Quinteros

## 2020-06-01 NOTE — OPERATIVE REPORT
Bronchoscopy Procedure Report     Preop diagnosis:       Abnormal CT  Postop diagnosis:      Abnormal CT  Procedure performed: Bronchoscopy, Diagnostic  Bronchoalveolar lavage, BAL  Transbronchial biopsy     Sedation used:          MAC, topical lidocaine

## 2020-06-01 NOTE — ANESTHESIA PREPROCEDURE EVALUATION
PRE-OP EVALUATION    Patient Name: Tricia Farley    Pre-op Diagnosis: ABNORMAL CTA    Procedure(s):  BRONCHOSCOPY WITH BRONCHIAL ALVEOLAR LAVAGE AND TRANSBRONCHIAL BIOPSY    Surgeon(s) and Role:     * Jasmina Ruffin MD - Primary    Pre-op vitals Procedure Laterality Date   • COLONOSCOPY     • COLONOSCOPY  11/2015    4 small adeonomas, diverticulosis. repeat 2018   • EGD  11/2015     schatzki's ring, non obstructive. Erosive esophagitis, LA grad 1 (no barretts on path).  Gastritis, no hpylori on p include deep sedation. Implied that memory of procedure is unlikely although intraop recall, if it occurs, may be a reasonable and comfortable experience with this anesthetic.   Aware that general anesthesia is not intended though deep sedation may include

## 2020-06-04 NOTE — PROGRESS NOTES
Call - mild inflammation. No signficant infection. This is the main concern.   Awaiting the cultures for a couple more weeks before deciding on next steps

## 2020-06-12 ENCOUNTER — VIRTUAL PHONE E/M (OUTPATIENT)
Dept: FAMILY MEDICINE CLINIC | Facility: CLINIC | Age: 55
End: 2020-06-12
Payer: COMMERCIAL

## 2020-06-12 ENCOUNTER — TELEPHONE (OUTPATIENT)
Dept: FAMILY MEDICINE CLINIC | Facility: CLINIC | Age: 55
End: 2020-06-12

## 2020-06-12 DIAGNOSIS — R05.9 COUGH: Primary | ICD-10-CM

## 2020-06-12 DIAGNOSIS — R06.00 DYSPNEA, UNSPECIFIED TYPE: ICD-10-CM

## 2020-06-12 DIAGNOSIS — D86.0 SARCOIDOSIS OF LUNG (HCC): ICD-10-CM

## 2020-06-12 PROCEDURE — 99213 OFFICE O/P EST LOW 20 MIN: CPT | Performed by: FAMILY MEDICINE

## 2020-06-12 NOTE — TELEPHONE ENCOUNTER
Future Appointments   Date Time Provider Tiffanie Do   6/15/2020 10:00 AM Mary Croft MD [facility]   6/19/2020 10:30 AM Aileen Perez MD [facility] [address]   9/21/2020  9:40 AM Kate Peterson MD [facility]     Patient verbal

## 2020-06-12 NOTE — PROGRESS NOTES
TELEMEDICINE VISIT by phone  This visit is conducted using Telemedicine with live, interactive audio.      Verification of patient identity was established by the  patient (s)  Tyler Moore verbally consents to a telemedi Powder, Breath Activated, Inhale 1 puff into the lungs 2 (two) times daily. , Disp: 1 each, Rfl: 6  •  Hydroxychloroquine Sulfate 200 MG Oral Tab, Take 1 tablet (200 mg total) by mouth 2 (two) times daily. , Disp: 180 tablet, Rfl: 1  •  FENOFIBRATE MICRONIZE sufficient and adequate time. This billing was spent on reviewing labs, medications, radiology tests and decision making. Appropriate medical decision-making and tests are ordered as detailed in the plan of care above.

## 2020-06-19 ENCOUNTER — VIRTUAL PHONE E/M (OUTPATIENT)
Dept: FAMILY MEDICINE CLINIC | Facility: CLINIC | Age: 55
End: 2020-06-19
Payer: COMMERCIAL

## 2020-06-19 ENCOUNTER — TELEPHONE (OUTPATIENT)
Dept: FAMILY MEDICINE CLINIC | Facility: CLINIC | Age: 55
End: 2020-06-19

## 2020-06-19 DIAGNOSIS — R05.9 COUGH: Primary | ICD-10-CM

## 2020-06-19 DIAGNOSIS — R06.00 DYSPNEA, UNSPECIFIED TYPE: ICD-10-CM

## 2020-06-19 PROCEDURE — 99213 OFFICE O/P EST LOW 20 MIN: CPT | Performed by: FAMILY MEDICINE

## 2020-06-19 NOTE — TELEPHONE ENCOUNTER
Pt had appt today and states he needs a note for work. OV notes unavailable for review at this time. Please advise.

## 2020-06-22 NOTE — PROGRESS NOTES
TELEMEDICINE VISIT by phone  This visit is conducted using Telemedicine with live, interactive audio.      Verification of patient identity was established by the  patient (s)  Dwain Dexter verbally consents to a telemedi by mouth daily. , Disp: , Rfl:   •  fluticasone-salmeterol (ADVAIR DISKUS) 250-50 MCG/DOSE Inhalation Aerosol Powder, Breath Activated, Inhale 1 puff into the lungs 2 (two) times daily. , Disp: 1 each, Rfl: 6  •  Hydroxychloroquine Sulfate 200 MG Oral Tab, T conscious effort was taken to allow for sufficient and adequate time. This billing was spent on reviewing labs, medications, radiology tests and decision making.   Appropriate medical decision-making and tests are ordered as detailed in the plan of care ab

## 2020-06-24 RX ORDER — RABEPRAZOLE SODIUM 20 MG/1
TABLET, DELAYED RELEASE ORAL
Qty: 30 TABLET | Refills: 0 | Status: SHIPPED | OUTPATIENT
Start: 2020-06-24 | End: 2020-07-30

## 2020-06-24 NOTE — TELEPHONE ENCOUNTER
Name from pharmacy: RABEPRAZOLE SOD DR 20 MG TAB          Will file in chart as: RABEPRAZOLE SODIUM 20 MG Oral Tab EC         Sig: TAKE 1 TABLET BY MOUTH EVERY DAY    Disp:  30 tablet    Refills:  0    Start: 6/24/2020    Class: Normal    Non-formulary

## 2020-07-10 DIAGNOSIS — R10.30 LOWER ABDOMINAL PAIN: ICD-10-CM

## 2020-07-10 DIAGNOSIS — E78.2 MIXED HYPERLIPIDEMIA: ICD-10-CM

## 2020-07-10 DIAGNOSIS — D86.9 SARCOID: ICD-10-CM

## 2020-07-14 RX ORDER — FENOFIBRATE 200 MG/1
CAPSULE ORAL
Qty: 90 CAPSULE | Refills: 1 | Status: SHIPPED | OUTPATIENT
Start: 2020-07-14 | End: 2021-01-06

## 2020-07-14 NOTE — TELEPHONE ENCOUNTER
Cholesterol Medication Protocol Passed7/10 12:38 AM   ALT < 80    ALT resulted within past year    Lipid panel within past 12 months    Appointment within past 12 or next 3 months     Refill protocol passed because the patient met the following protocol fo

## 2020-07-29 NOTE — TELEPHONE ENCOUNTER
Name from pharmacy: RABEPRAZOLE SOD DR 20 MG TAB          Will file in chart as: RABEPRAZOLE SODIUM 20 MG Oral Tab EC         Sig: TAKE 1 TABLET BY MOUTH EVERY DAY    Disp:  30 tablet    Refills:  0    Start: 7/29/2020    Class: Normal    Non-formulary

## 2020-07-30 RX ORDER — RABEPRAZOLE SODIUM 20 MG/1
TABLET, DELAYED RELEASE ORAL
Qty: 90 TABLET | Refills: 0 | Status: ON HOLD | OUTPATIENT
Start: 2020-07-30 | End: 2020-08-23

## 2020-08-19 ENCOUNTER — TELEPHONE (OUTPATIENT)
Dept: FAMILY MEDICINE CLINIC | Facility: CLINIC | Age: 55
End: 2020-08-19

## 2020-08-19 ENCOUNTER — APPOINTMENT (OUTPATIENT)
Dept: GENERAL RADIOLOGY | Age: 55
DRG: 198 | End: 2020-08-19
Payer: COMMERCIAL

## 2020-08-19 ENCOUNTER — HOSPITAL ENCOUNTER (INPATIENT)
Facility: HOSPITAL | Age: 55
LOS: 3 days | Discharge: HOME | DRG: 198 | End: 2020-08-23
Attending: EMERGENCY MEDICINE | Admitting: HOSPITALIST
Payer: COMMERCIAL

## 2020-08-19 DIAGNOSIS — R52 BODY ACHES: ICD-10-CM

## 2020-08-19 DIAGNOSIS — R07.9 CHEST PAIN OF UNCERTAIN ETIOLOGY: Primary | ICD-10-CM

## 2020-08-19 LAB
APTT PPP: 24.1 SECONDS (ref 25.4–36.1)
BASOPHILS # BLD AUTO: 0.01 X10(3) UL (ref 0–0.2)
BASOPHILS NFR BLD AUTO: 0.1 %
DEPRECATED RDW RBC AUTO: 47.2 FL (ref 35.1–46.3)
EOSINOPHIL # BLD AUTO: 0.03 X10(3) UL (ref 0–0.7)
EOSINOPHIL NFR BLD AUTO: 0.2 %
ERYTHROCYTE [DISTWIDTH] IN BLOOD BY AUTOMATED COUNT: 13.8 % (ref 11–15)
HCT VFR BLD AUTO: 49.2 % (ref 39–53)
HGB BLD-MCNC: 16.7 G/DL (ref 13–17.5)
IMM GRANULOCYTES # BLD AUTO: 0.07 X10(3) UL (ref 0–1)
IMM GRANULOCYTES NFR BLD: 0.6 %
INR BLD: 0.91 (ref 0.88–1.11)
LACTATE SERPL-SCNC: 1.8 MMOL/L (ref 0.4–2)
LYMPHOCYTES # BLD AUTO: 0.92 X10(3) UL (ref 1–4)
LYMPHOCYTES NFR BLD AUTO: 7.5 %
MCH RBC QN AUTO: 31.5 PG (ref 26–34)
MCHC RBC AUTO-ENTMCNC: 33.9 G/DL (ref 31–37)
MCV RBC AUTO: 92.7 FL (ref 80–100)
MONOCYTES # BLD AUTO: 0.66 X10(3) UL (ref 0.1–1)
MONOCYTES NFR BLD AUTO: 5.3 %
NEUTROPHILS # BLD AUTO: 10.65 X10 (3) UL (ref 1.5–7.7)
NEUTROPHILS # BLD AUTO: 10.65 X10(3) UL (ref 1.5–7.7)
NEUTROPHILS NFR BLD AUTO: 86.3 %
PLATELET # BLD AUTO: 198 10(3)UL (ref 150–450)
PSA SERPL DL<=0.01 NG/ML-MCNC: 12.2 SECONDS (ref 12–14.3)
RBC # BLD AUTO: 5.31 X10(6)UL (ref 4.3–5.7)
WBC # BLD AUTO: 12.3 X10(3) UL (ref 4–11)

## 2020-08-19 PROCEDURE — 71045 X-RAY EXAM CHEST 1 VIEW: CPT

## 2020-08-19 RX ORDER — ACETAMINOPHEN 500 MG
1000 TABLET ORAL ONCE
Status: COMPLETED | OUTPATIENT
Start: 2020-08-19 | End: 2020-08-19

## 2020-08-19 RX ORDER — AZATHIOPRINE 50 MG/1
50 TABLET ORAL DAILY
Status: ON HOLD | COMMUNITY
End: 2020-08-23

## 2020-08-19 NOTE — TELEPHONE ENCOUNTER
Representative from Dr. Lacy Spatz office at [facility] called stating pt needs to labs drawn due to the medication he is taking. Representative stated pt provided a fax number and informed them to fax to [facility].   Representative did not have our fax number, p

## 2020-08-19 NOTE — TELEPHONE ENCOUNTER
See owen [place] MARIE at [facility] will be faxing lab orders to for therapeutic drug monitoring to be placed by you.

## 2020-08-20 ENCOUNTER — APPOINTMENT (OUTPATIENT)
Dept: CT IMAGING | Age: 55
DRG: 198 | End: 2020-08-20
Attending: EMERGENCY MEDICINE
Payer: COMMERCIAL

## 2020-08-20 ENCOUNTER — APPOINTMENT (OUTPATIENT)
Dept: CV DIAGNOSTICS | Facility: HOSPITAL | Age: 55
DRG: 198 | End: 2020-08-20
Attending: HOSPITALIST
Payer: COMMERCIAL

## 2020-08-20 PROBLEM — R07.9 CHEST PAIN OF UNCERTAIN ETIOLOGY: Status: ACTIVE | Noted: 2020-08-20

## 2020-08-20 PROBLEM — R52 BODY ACHES: Status: ACTIVE | Noted: 2020-08-20

## 2020-08-20 LAB
ALBUMIN SERPL-MCNC: 3.7 G/DL (ref 3.4–5)
ALBUMIN/GLOB SERPL: 1.3 {RATIO} (ref 1–2)
ALP LIVER SERPL-CCNC: 74 U/L (ref 45–117)
ALT SERPL-CCNC: 58 U/L (ref 16–61)
ANION GAP SERPL CALC-SCNC: 5 MMOL/L (ref 0–18)
AST SERPL-CCNC: 30 U/L (ref 15–37)
ATRIAL RATE: 115 BPM
BILIRUB SERPL-MCNC: 0.4 MG/DL (ref 0.1–2)
BILIRUB UR QL STRIP.AUTO: NEGATIVE
BUN BLD-MCNC: 18 MG/DL (ref 7–18)
BUN/CREAT SERPL: 14.1 (ref 10–20)
CALCIUM BLD-MCNC: 9.3 MG/DL (ref 8.5–10.1)
CHLORIDE SERPL-SCNC: 101 MMOL/L (ref 98–112)
CHOLEST SMN-MCNC: 123 MG/DL (ref ?–200)
CLARITY UR REFRACT.AUTO: CLEAR
CO2 SERPL-SCNC: 31 MMOL/L (ref 21–32)
COLOR UR AUTO: YELLOW
CREAT BLD-MCNC: 1.28 MG/DL (ref 0.7–1.3)
CRP SERPL-MCNC: 2.19 MG/DL (ref ?–0.3)
GLOBULIN PLAS-MCNC: 2.8 G/DL (ref 2.8–4.4)
GLUCOSE BLD-MCNC: 109 MG/DL (ref 70–99)
GLUCOSE UR STRIP.AUTO-MCNC: NEGATIVE MG/DL
HDLC SERPL-MCNC: 58 MG/DL (ref 40–59)
KETONES UR STRIP.AUTO-MCNC: NEGATIVE MG/DL
LDLC SERPL CALC-MCNC: 39 MG/DL (ref ?–100)
LEUKOCYTE ESTERASE UR QL STRIP.AUTO: NEGATIVE
M PROTEIN MFR SERPL ELPH: 6.5 G/DL (ref 6.4–8.2)
NITRITE UR QL STRIP.AUTO: NEGATIVE
NONHDLC SERPL-MCNC: 65 MG/DL (ref ?–130)
OSMOLALITY SERPL CALC.SUM OF ELEC: 286 MOSM/KG (ref 275–295)
P AXIS: 21 DEGREES
P-R INTERVAL: 136 MS
PH UR STRIP.AUTO: 7.5 [PH] (ref 4.5–8)
POTASSIUM SERPL-SCNC: 3.9 MMOL/L (ref 3.5–5.1)
PROCALCITONIN SERPL-MCNC: 0.31 NG/ML (ref ?–0.16)
PROT UR STRIP.AUTO-MCNC: NEGATIVE MG/DL
Q-T INTERVAL: 314 MS
QRS DURATION: 78 MS
QTC CALCULATION (BEZET): 434 MS
R AXIS: -10 DEGREES
SARS-COV-2 RNA RESP QL NAA+PROBE: NOT DETECTED
SODIUM SERPL-SCNC: 137 MMOL/L (ref 136–145)
SP GR UR STRIP.AUTO: >=1.03 (ref 1–1.03)
T AXIS: 56 DEGREES
TRIGL SERPL-MCNC: 128 MG/DL (ref 30–149)
TROPONIN I SERPL-MCNC: <0.045 NG/ML (ref ?–0.04)
UROBILINOGEN UR STRIP.AUTO-MCNC: 0.2 MG/DL
VENTRICULAR RATE: 115 BPM
VLDLC SERPL CALC-MCNC: 26 MG/DL (ref 0–30)

## 2020-08-20 PROCEDURE — 93306 TTE W/DOPPLER COMPLETE: CPT | Performed by: HOSPITALIST

## 2020-08-20 PROCEDURE — 99223 1ST HOSP IP/OBS HIGH 75: CPT | Performed by: HOSPITALIST

## 2020-08-20 PROCEDURE — 71250 CT THORAX DX C-: CPT | Performed by: EMERGENCY MEDICINE

## 2020-08-20 RX ORDER — METHYLPREDNISOLONE 4 MG/1
16 TABLET ORAL DAILY
Status: DISCONTINUED | OUTPATIENT
Start: 2020-08-20 | End: 2020-08-23

## 2020-08-20 RX ORDER — ONDANSETRON 2 MG/ML
4 INJECTION INTRAMUSCULAR; INTRAVENOUS EVERY 6 HOURS PRN
Status: DISCONTINUED | OUTPATIENT
Start: 2020-08-20 | End: 2020-08-23

## 2020-08-20 RX ORDER — METOCLOPRAMIDE HYDROCHLORIDE 5 MG/ML
5 INJECTION INTRAMUSCULAR; INTRAVENOUS EVERY 8 HOURS PRN
Status: DISCONTINUED | OUTPATIENT
Start: 2020-08-20 | End: 2020-08-23

## 2020-08-20 RX ORDER — HYDROMORPHONE HYDROCHLORIDE 1 MG/ML
1 INJECTION, SOLUTION INTRAMUSCULAR; INTRAVENOUS; SUBCUTANEOUS EVERY 30 MIN PRN
Status: DISCONTINUED | OUTPATIENT
Start: 2020-08-20 | End: 2020-08-20

## 2020-08-20 RX ORDER — ASPIRIN 325 MG
325 TABLET ORAL DAILY
Status: DISCONTINUED | OUTPATIENT
Start: 2020-08-20 | End: 2020-08-23

## 2020-08-20 RX ORDER — TRAMADOL HYDROCHLORIDE 50 MG/1
50 TABLET ORAL EVERY 6 HOURS PRN
Status: DISCONTINUED | OUTPATIENT
Start: 2020-08-20 | End: 2020-08-23

## 2020-08-20 RX ORDER — ATORVASTATIN CALCIUM 20 MG/1
20 TABLET, FILM COATED ORAL NIGHTLY
Status: DISCONTINUED | OUTPATIENT
Start: 2020-08-20 | End: 2020-08-23

## 2020-08-20 RX ORDER — HYDROXYCHLOROQUINE SULFATE 200 MG/1
200 TABLET, FILM COATED ORAL 2 TIMES DAILY
Status: DISCONTINUED | OUTPATIENT
Start: 2020-08-20 | End: 2020-08-23

## 2020-08-20 RX ORDER — NITROGLYCERIN 0.4 MG/1
0.4 TABLET SUBLINGUAL EVERY 5 MIN PRN
Status: DISCONTINUED | OUTPATIENT
Start: 2020-08-20 | End: 2020-08-23

## 2020-08-20 RX ORDER — HYDROCHLOROTHIAZIDE 25 MG/1
25 TABLET ORAL DAILY
Status: DISCONTINUED | OUTPATIENT
Start: 2020-08-20 | End: 2020-08-20

## 2020-08-20 RX ORDER — HYDROMORPHONE HYDROCHLORIDE 1 MG/ML
0.5 INJECTION, SOLUTION INTRAMUSCULAR; INTRAVENOUS; SUBCUTANEOUS EVERY 30 MIN PRN
Status: ACTIVE | OUTPATIENT
Start: 2020-08-20 | End: 2020-08-20

## 2020-08-20 RX ORDER — SODIUM CHLORIDE 9 MG/ML
INJECTION, SOLUTION INTRAVENOUS CONTINUOUS
Status: ACTIVE | OUTPATIENT
Start: 2020-08-20 | End: 2020-08-20

## 2020-08-20 RX ORDER — IBUPROFEN 600 MG/1
600 TABLET ORAL ONCE
Status: COMPLETED | OUTPATIENT
Start: 2020-08-20 | End: 2020-08-20

## 2020-08-20 RX ORDER — ONDANSETRON 2 MG/ML
4 INJECTION INTRAMUSCULAR; INTRAVENOUS EVERY 4 HOURS PRN
Status: DISCONTINUED | OUTPATIENT
Start: 2020-08-20 | End: 2020-08-21

## 2020-08-20 RX ORDER — ONDANSETRON 2 MG/ML
4 INJECTION INTRAMUSCULAR; INTRAVENOUS ONCE
Status: COMPLETED | OUTPATIENT
Start: 2020-08-20 | End: 2020-08-20

## 2020-08-20 RX ORDER — ENOXAPARIN SODIUM 100 MG/ML
40 INJECTION SUBCUTANEOUS DAILY
Status: DISCONTINUED | OUTPATIENT
Start: 2020-08-20 | End: 2020-08-23

## 2020-08-20 RX ORDER — PANTOPRAZOLE SODIUM 20 MG/1
20 TABLET, DELAYED RELEASE ORAL
Status: DISCONTINUED | OUTPATIENT
Start: 2020-08-20 | End: 2020-08-23

## 2020-08-20 RX ORDER — ACETAMINOPHEN 325 MG/1
650 TABLET ORAL EVERY 6 HOURS PRN
Status: DISCONTINUED | OUTPATIENT
Start: 2020-08-20 | End: 2020-08-23

## 2020-08-20 NOTE — H&P
[facility] HOSPITAL[facility]  History and Physical     Veronica Hogue Patient Status:  Inpatient    1965 MRN [id number]   [facility] 8NE-A Attending Zeb Mccall MD   Hosp Day # 0 PCP Collins cOhoa MD     Chief Complaint: chest pain History   Problem Relation Age of Onset   • Heart Disorder Father    • Stroke Father    • Other (Other) Father         cva   • Diabetes Mother    • Heart Disorder Mother    • Other (Other) Mother         cva   • Diabetes Sister        Allergies:   Predniso rubs or gallops. Equal pulses. Chest and Back: No tenderness or deformity. Abdomen: Soft, nontender, nondistended. Positive bowel sounds. No rebound, guarding or organomegaly. Neurologic: No focal neurological deficits. CNII-XII grossly intact.   Muscu

## 2020-08-20 NOTE — PLAN OF CARE
Assumed care for patient at 0400 from  ED.   AOx4. Calm, cooperative. Independent. Up ad LISSA. NSR on cardiac monitor. Pt placed on 2L O2. Lung sounds clear/diminished. C/o chest pain with inspiration, body aches, headache. MD notified.  Voiding without di

## 2020-08-20 NOTE — ED NOTES
Report given to [facility] medics. Pt being transported to [facility] at this time by ambulance.

## 2020-08-20 NOTE — PLAN OF CARE
Assumed patient care at 0730 this AM. Patient A&O x4. SPO2 maintained on 2L overnight, now on RA. Hx JEANETTE- no CPAP and sarcoidosis. Consult called to pulm per orders.  Pt states that his breathing is \"heavy\" but that is his usual. NSR on tele, lovenox SQ. fever/infection during anticipated neutropenic period  Description  INTERVENTIONS  - Monitor WBC  - Administer growth factors as ordered  - Implement neutropenic guidelines  Outcome: Progressing     Problem: SAFETY ADULT - FALL  Goal: Free from fall injury facilitate oxygenation and minimize respiratory effort  - Oxygen supplementation based on oxygen saturation or ABGs  - Provide Smoking Cessation handout, if applicable  - Encourage broncho-pulmonary hygiene including cough, deep breathe, Incentive Spiromet

## 2020-08-20 NOTE — PAYOR COMM NOTE
--------------  ADMISSION REVIEW     Payor: JAD [facility]  Subscriber #:  [id number]  Authorization Number: [id number]    Admit date: 8/20/20  Admit time: 7411       Admitting Physician: Lucy Bradley MD  Attending Physician:  Axel Wiley MD  Primary Ca • Obstructive apnea 07/26/2017    AHI 24 RDI 26 SaO2 clay 82 % CPAP 13 Premier   • Pain in joints    • Sarcoidosis    • Shortness of breath     no oxygen   • Sleep apnea    • Wheezing               Past Surgical History:   Procedure Laterality Date   • BR Patient is febrile but nontoxic in appearance. Alert and cooperative. He does appear to be in moderate distress due to pain. He peers to be breathing easily. Skin: Warm and dry without cyanosis, pallor, rash  HEENT: No tenderness to the face or scalp. The following orders were created for panel order CBC WITH DIFFERENTIAL WITH PLATELET.   Procedure                               Abnormality         Status                     ---------                               -----------         ------ Filed:  2020  7:31 AM Date of Service:  2020  6:06 AM Status:  Signed    :  Sina Koenig MD (Physician)           [facility]  History and Physical     [facility] Patient Status:  Inpatient    1965 MRN [id number] Social History:  reports that he quit smoking about 20 years ago. His smoking use included cigarettes. He has a 4.00 pack-year smoking history.  He has never used smokeless tobacco. He reports current alcohol use of about 4.0 standard drinks of alcohol per /73 (BP Location: Left arm)   Pulse 70   Temp 97.7 °F (36.5 °C) (Oral)   Resp 18   Ht 5' 8.11\" (1.73 m)   Wt 205 lb 4 oz (93.1 kg)   SpO2 100%   BMI 31.11 kg/m²    General: No acute distress. Alert and oriented x 3. HEENT: Normocephalic atraumatic. Dispo: as above. Symptoms appear to be due to recently started imuran. If fever/malaise return will need further w/u.  Will check echo to r/o myocarditis      Quality:  · DVT Prophylaxis: lovenox  · CODE status: full  · [place]: no    Plan of care discussed Ángel Wells is a 47year old former-smoker with biopsy proven sarcoid,HTN, and GERD who is admitted with fevers, myalgias, and fatigue.  Had been on plaquenil from [facility] sarcoid that was stopped late last year with recurrence of symptoms inclu Smoking status: Former Smoker        Packs/day: 0.50        Years: 8.00        Pack years: 4        Types: Cigarettes        Quit date:         Years since quittin.6      Smokeless tobacco: Never Used      Tobacco comment: only vernon shen WBC 12.3*   .0          Recent Labs   Lab 08/19/20  2317   *   BUN 18   CREATSERUM 1.28   GFRAA 73   GFRNAA 63   CA 9.3   ALB 3.7      K 3.9      CO2 31.0   ALKPHO 74   AST 30   ALT 58   BILT 0.4   TP 6.5      No results for input 8/20/2020 4445 Given 20 mg Oral Kym Melgar, RN      sodium chloride 0.9% IV bolus 2,517 mL     Date Action Dose Route User    8/19/2020 2345 New Bag 2517 mL Intravenous Jared Turner RN      fenofibrate micronized (LOFIBRA) cap 201 mg     Date Act

## 2020-08-20 NOTE — ED PROVIDER NOTES
Patient Seen in: [facility]      History   Patient presents with:  Chest Pain Angina    Stated Complaint:     HPI    This is a patient with sarcoidosis followed at the [facility] who presents with fever and generalized bod biopsies   • OTHER SURGICAL HISTORY      septoplasty   • OTHER SURGICAL HISTORY      kidney stones                    Social History    Tobacco Use      Smoking status: Former Smoker        Packs/day: 0.50        Years: 8.00        Pack years: 4        Typ of DVT. No joint tenderness or swelling. Neuro: Alert and oriented. Speech clear. No facial asymmetry. Moving all 4 extremities normally.        ED Course     Labs Reviewed   COMP METABOLIC PANEL (14) - Abnormal; Notable for the following components: COVID-19. No focal consolidation. MDM     Patient with acute febrile illness, no findings of focal infection on initial work-up. Work-up not strongly suggestive of sepsis at this point. Possibly side effect of azathioprine.   Patient is feeling some

## 2020-08-20 NOTE — CONSULTS
Pulmonary H&P/Consult     NAME: Alanis Rod - ROOM: [facility]5146-L - MRN: [id number] - Age: 47year old - :  1965    Date of Admission: 2020 10:56 PM  Admission Diagnosis: Body aches [R52]  Chest pain of uncertain etiology [Z36.49]    Assess joints    • Sarcoidosis    • Shortness of breath     no oxygen   • Sleep apnea    • Wheezing      Past Surgical History:   Procedure Laterality Date   • BRONCHOSCOPY WITH TRANSBRONCH BIOPSY N/A 6/1/2020    Performed by Adore Duckworth MD at [address] 8/20/2020 1005  Last data filed at 8/20/2020 0402  Gross per 24 hour   Intake 0 ml   Output 0 ml   Net 0 ml     /75 (BP Location: Left arm)   Pulse 64   Temp 97.6 °F (36.4 °C) (Oral)   Resp 18   Ht 5' 8.11\" (1.73 m)   Wt 205 lb 4 oz (93.1 kg)   SpO2

## 2020-08-21 LAB — SARS-COV-2 RNA RESP QL NAA+PROBE: NOT DETECTED

## 2020-08-21 PROCEDURE — 99232 SBSQ HOSP IP/OBS MODERATE 35: CPT | Performed by: HOSPITALIST

## 2020-08-21 NOTE — PROGRESS NOTES
[id number] [facility] Patient Status:  Inpatient    1965 MRN [id number]   [facility] 8NE-A Attending eKsha Stone MD   Hosp Day # 1 PCP Heidy Thompson MD     SUBJECTIVE: Pt states that headache is improved, generaliz wall: No tenderness or deformity.                         GQJRN: KOMRQYK rate and rhythm, normal S1S2                          Abdomen: soft, non-tender, non-distended, positive BS.                         Extremity: No clubbing or cyanosis.  no edema

## 2020-08-21 NOTE — PLAN OF CARE
Assumed patient care at 0730 this AM. Patient A&O x4. SPO2 maintained on RA, pt reports pain with inspiration. Maintained on isolation for covid r/o, PCR pending. NSR on tele, lovenox SQ. Pt is continent of B&B. Up ad marine.  Pt reports constant generalized b ordered  - Implement neutropenic guidelines  Outcome: Progressing     Problem: SAFETY ADULT - FALL  Goal: Free from fall injury  Description  INTERVENTIONS:  - Assess pt frequently for physical needs  - Identify cognitive and physical deficits and behavior Cessation handout, if applicable  - Encourage broncho-pulmonary hygiene including cough, deep breathe, Incentive Spirometry  - Assess the need for suctioning and perform as needed  - Assess and instruct to report SOB or any respiratory difficulty  - Respir

## 2020-08-21 NOTE — PLAN OF CARE
Assumed care at [facility]. Pt is A&Ox4, up ad marine. Denies chest pain but complaining of SOB on and off. Lung sounds clear but diminished bilaterally. Pt is NSR on tele, RRR. No cardiac symptoms. Bowel sounds active, last BM 8/19.  Generalized pain unrelieved wit injury  Description  INTERVENTIONS:  - Assess pt frequently for physical needs  - Identify cognitive and physical deficits and behaviors that affect risk of falls.   - [place] fall precautions as indicated by assessment.  - Educate pt/family on patient sa Spirometry  - Assess the need for suctioning and perform as needed  - Assess and instruct to report SOB or any respiratory difficulty  - Respiratory Therapy support as indicated  - Manage/alleviate anxiety  - Monitor for signs/symptoms of CO2 retention  Bertell Honor

## 2020-08-21 NOTE — PROGRESS NOTES
[facility] HOSPITALIST  Progress Note     Andrea Olmedo Patient Status:  Inpatient    1965 MRN [id number]   [facility] 8NE-A Attending Tony Cortez MD   Hosp Day # 1 PCP Charmayne Devoid, MD     Chief Complaint: chest pain, dyspnea Pantoprazole Sodium  20 mg Oral QAM AC   • Hydroxychloroquine Sulfate  200 mg Oral BID   • Fluticasone Furoate-Vilanterol  1 puff Inhalation Daily   • atorvastatin  20 mg Oral Nightly   • fenofibrate micronized  201 mg Oral Daily   • enoxaparin  40 mg Subc

## 2020-08-22 LAB — GLUCOSE BLD-MCNC: 118 MG/DL (ref 70–99)

## 2020-08-22 PROCEDURE — 99232 SBSQ HOSP IP/OBS MODERATE 35: CPT | Performed by: HOSPITALIST

## 2020-08-22 NOTE — PROGRESS NOTES
Assumed care of patient at 0700. Alert OX3  He is on room air with good O2 sats. Occasional cough - dry, non -productive. IS given to patient - getting to 750. C/O some shortness of breath when up ambulating.   Tele= SR  Taking po well  Nausea at 1400 s

## 2020-08-22 NOTE — PLAN OF CARE
Pt denies c/o malaise or cardiac symptoms. A&Ox4. Lungs diminished bilaterally with equal expansion, on room air. Pt NSR on monitor with regular rate. Abdomen soft and non-tender with active bowel sounds in all four quadrants. Pts face red and flushed.  Pt Problem: SAFETY ADULT - FALL  Goal: Free from fall injury  Description  INTERVENTIONS:  - Assess pt frequently for physical needs  - Identify cognitive and physical deficits and behaviors that affect risk of falls.   - [place] fall precautions as indica hygiene including cough, deep breathe, Incentive Spirometry  - Assess the need for suctioning and perform as needed  - Assess and instruct to report SOB or any respiratory difficulty  - Respiratory Therapy support as indicated  - Manage/alleviate anxiety

## 2020-08-22 NOTE — PLAN OF CARE
Received bedside report on this Pt., at 1520. Pt., awake, A&Ox4, calm and cooperative. Pt., is in SR on Tele monitor, sats greater than 92% on RA. Pt., does report dyspnea with exertion.  Pt., had received Zofran a few hours ago for nausea, states he's \"fe period  Description  INTERVENTIONS  - Monitor WBC  - Administer growth factors as ordered  - Implement neutropenic guidelines  Outcome: Progressing     Problem: SAFETY ADULT - FALL  Goal: Free from fall injury  Description  INTERVENTIONS:  - Assess pt freq effort  - Oxygen supplementation based on oxygen saturation or ABGs  - Provide Smoking Cessation handout, if applicable  - Encourage broncho-pulmonary hygiene including cough, deep breathe, Incentive Spirometry  - Assess the need for suctioning and perform

## 2020-08-22 NOTE — PROGRESS NOTES
Patient called with new onset nausea, and feeling lightheaded. Tele NSR, /76  Glucose checked- normal.  IV antibiotics had just finished so possibly r/t that. IV Zofran given- will reassess patient in 15-20 minutes.

## 2020-08-22 NOTE — PROGRESS NOTES
[facility] HOSPITALIST  Progress Note     Maurie Dance Patient Status:  Inpatient    1965 MRN [id number]   [facility] 8NE-A Attending Anthony Nixon MD   Hosp Day # 2 PCP Lemuel Burks MD     Chief Complaint: chest pain, dyspnea cefTRIAXone  1 g Intravenous Q24H   • Pantoprazole Sodium  20 mg Oral QAM AC   • Hydroxychloroquine Sulfate  200 mg Oral BID   • Fluticasone Furoate-Vilanterol  1 puff Inhalation Daily   • atorvastatin  20 mg Oral Nightly   • fenofibrate micronized  201 mg

## 2020-08-22 NOTE — PROGRESS NOTES
[address] [facility] Patient Status:  Inpatient    1965 MRN [id number]   [facility] 8NE-A Attending Jacy Monsivais MD   Hosp Day # 2 PCP Nohemi Garner MD     SUBJECTIVE:  Pt states that he feels slightly better today Exam:                          General: alert, cooperative, in NAD                          HEENT: oropharynx clear without erythema or exudates, moist mucous membranes                          Lungs: bibasilar crackles                           Chest wall

## 2020-08-23 VITALS
HEART RATE: 69 BPM | DIASTOLIC BLOOD PRESSURE: 71 MMHG | SYSTOLIC BLOOD PRESSURE: 121 MMHG | RESPIRATION RATE: 20 BRPM | OXYGEN SATURATION: 97 % | BODY MASS INDEX: 30.01 KG/M2 | HEIGHT: 68.11 IN | WEIGHT: 198 LBS | TEMPERATURE: 98 F

## 2020-08-23 PROCEDURE — 99239 HOSP IP/OBS DSCHRG MGMT >30: CPT | Performed by: HOSPITALIST

## 2020-08-23 RX ORDER — CEFUROXIME AXETIL 500 MG/1
500 TABLET ORAL 2 TIMES DAILY
Qty: 6 TABLET | Refills: 0 | Status: SHIPPED | OUTPATIENT
Start: 2020-08-23 | End: 2020-08-26

## 2020-08-23 RX ORDER — METHYLPREDNISOLONE 16 MG/1
16 TABLET ORAL DAILY
Qty: 30 TABLET | Refills: 0 | Status: SHIPPED | OUTPATIENT
Start: 2020-08-24 | End: 2020-11-30 | Stop reason: ALTCHOICE

## 2020-08-23 NOTE — PLAN OF CARE
8/23/2020    Pt discharged today  Gave discharge papers and instructions. Pt understood  Scripts electronically sent to [facility] in [place] off of [address] [address]    Member of staff to walk pt down. Pt ambulatory.  No wheelchair needed  Wife to  pt at

## 2020-08-23 NOTE — PROGRESS NOTES
[facility] HOSPITALIST  Progress Note     Dwain Dexter Patient Status:  Inpatient    1965 MRN [id number]   [facility] 8NE-A Attending Nina Segura MD   Hosp Day # 3 PCP Sha Ochoa MD     Chief Complaint: chest pain, dyspnea Intravenous Q24H   • cefTRIAXone  1 g Intravenous Q24H   • Pantoprazole Sodium  20 mg Oral QAM AC   • Hydroxychloroquine Sulfate  200 mg Oral BID   • Fluticasone Furoate-Vilanterol  1 puff Inhalation Daily   • atorvastatin  20 mg Oral Nightly   • fenofibra

## 2020-08-23 NOTE — PROGRESS NOTES
Prakash Gonzalez [facility]  [id number] Patient Status:  Inpatient    1965 MRN [id number]   [facility] 8NE-A Attending Kesha Stone MD   Hosp Day # 3 PCP Heidy Thompson MD     SUBJECTIVE:  Pt states that he continues to feel NAD                          HEENT: oropharynx clear without erythema or exudates, moist mucous membranes                          Lungs: bibasilar crackles                           Chest wall: No tenderness or deformity.                           CAEKK: P

## 2020-08-23 NOTE — PLAN OF CARE
8/23/2020    A&Ox4  RA  No cough  Fine crackles to L lung base  NSR on tele  Denies any pain or sob  Continent of B and B  Up ad marine    Pt will be discharged today after all abx given          Problem: PAIN - ADULT  Goal: Verbalizes/displays adequate comfo for changes in respiratory status  - Assess for changes in mentation and behavior  - Position to facilitate oxygenation and minimize respiratory effort  - Oxygen supplementation based on oxygen saturation or ABGs  - Provide Smoking Cessation handout, if ap

## 2020-08-23 NOTE — DISCHARGE SUMMARY
[facility]  DISCHARGE SUMMARY     Tami Menezes Patient Status:  Inpatient    1965 MRN [id number]   [facility] 8NE-A Attending Susan Dhillon MD   Hosp Day # 3 PCP Duke Bill MD     Date of Admission: 2020  Date fenofibrate micronized 200 MG Caps  Commonly known as:  LOFIBRA      TAKE 1 CAPSULE BY MOUTH EVERY DAY   Quantity:  90 capsule  Refills:  1     fluticasone-salmeterol 250-50 MCG/DOSE Aepb  Commonly known as:  Advair Diskus      Inhale 1 puff into the [facility]    Yuri Lane MD    Time spent:  > 30 minutes

## 2020-08-23 NOTE — PLAN OF CARE
Assumed care of pt @2330. Pt axox4. States still has pain in chest. Declines need for pain medication at this time. Sinus rhythm on tele. Lungs with fine crackles at left base, right clear. O2 sat on RA 96%. Occ non productive cough. -1000.  Abdomen s patient safety including physical limitations  - Instruct pt to call for assistance with activity based on assessment  - Modify environment to reduce risk of injury  - Provide assistive devices as appropriate  - Consider OT/PT consult to assist with streng

## 2020-08-24 ENCOUNTER — PATIENT OUTREACH (OUTPATIENT)
Dept: CASE MANAGEMENT | Age: 55
End: 2020-08-24

## 2020-08-24 DIAGNOSIS — Z02.9 ENCOUNTERS FOR UNSPECIFIED ADMINISTRATIVE PURPOSE: ICD-10-CM

## 2020-08-24 DIAGNOSIS — K64.1 SECOND DEGREE HEMORRHOIDS: ICD-10-CM

## 2020-08-24 DIAGNOSIS — R07.9 CHEST PAIN OF UNCERTAIN ETIOLOGY: ICD-10-CM

## 2020-08-24 DIAGNOSIS — D86.9 SARCOIDOSIS: ICD-10-CM

## 2020-08-24 PROCEDURE — 1111F DSCHRG MED/CURRENT MED MERGE: CPT

## 2020-08-25 ENCOUNTER — TELEPHONE (OUTPATIENT)
Dept: FAMILY MEDICINE CLINIC | Facility: CLINIC | Age: 55
End: 2020-08-25

## 2020-08-25 NOTE — TELEPHONE ENCOUNTER
Future Appointments   Date Time Provider Tiffanie Do   8/31/2020 11:00 AM Jian Crowder MD [facility] [address]   9/21/2020  9:40 AM Prem Peterson MD  Lupe Yo

## 2020-08-25 NOTE — PAYOR COMM NOTE
--------------  DISCHARGE REVIEW    Payor: JAD [facility]  Subscriber #:  [id number]  Authorization Number: [id number]    Admit date: 8/20/20  Admit time:  0355  Discharge Date: 8/23/2020  4:47 PM     Admitting Physician: Kunal Rosas MD  Attending Physicia Medium Risk  0-28   Low Risk         TCM Follow-Up Recommendation:  LACE 29-58:  Moderate Risk of readmission after discharge from the hospital.    Procedures during hospitalization:   • none    Incidental or significant findings and recommendations (brief (36.6 °C)-98.9 °F (37.2 °C)] 98.5 °F (36.9 °C)  Pulse:  [66-76] 72  Resp:  [16-20] 20  BP: (115-130)/(71-80) 127/77    Physical Exam:    General: No acute distress. Respiratory: Clear to auscultation bilaterally. No wheezes. No rhonchi.   Cardiovascular:

## 2020-08-25 NOTE — TELEPHONE ENCOUNTER
Spoke to pt for TCM today. Pt does not have HFU appt scheduled at this time. Attempted to schedule an appt with PCP however unable to due to schedule limitations and pt is working afternoons.  Pt reports he still feels very tired, his body is aching and he

## 2020-08-31 ENCOUNTER — OFFICE VISIT (OUTPATIENT)
Dept: FAMILY MEDICINE CLINIC | Facility: CLINIC | Age: 55
End: 2020-08-31
Payer: COMMERCIAL

## 2020-08-31 VITALS
RESPIRATION RATE: 16 BRPM | BODY MASS INDEX: 29.55 KG/M2 | DIASTOLIC BLOOD PRESSURE: 80 MMHG | TEMPERATURE: 98 F | WEIGHT: 195 LBS | HEART RATE: 94 BPM | OXYGEN SATURATION: 97 % | SYSTOLIC BLOOD PRESSURE: 115 MMHG | HEIGHT: 68.11 IN

## 2020-08-31 DIAGNOSIS — M25.50 ARTHRALGIA, UNSPECIFIED JOINT: ICD-10-CM

## 2020-08-31 DIAGNOSIS — D86.9 SARCOIDOSIS: ICD-10-CM

## 2020-08-31 DIAGNOSIS — R52 BODY ACHES: Primary | ICD-10-CM

## 2020-08-31 PROCEDURE — 3008F BODY MASS INDEX DOCD: CPT | Performed by: FAMILY MEDICINE

## 2020-08-31 PROCEDURE — 99214 OFFICE O/P EST MOD 30 MIN: CPT | Performed by: FAMILY MEDICINE

## 2020-08-31 PROCEDURE — 3074F SYST BP LT 130 MM HG: CPT | Performed by: FAMILY MEDICINE

## 2020-08-31 PROCEDURE — 3079F DIAST BP 80-89 MM HG: CPT | Performed by: FAMILY MEDICINE

## 2020-08-31 NOTE — PROGRESS NOTES
HPI:   Maurie Dance is a 47year old male that presents for hospital follow-up of exacerbation of sarcoidosis. Patient recently was started by [facility] on azathioprine and had developed myalgia fever fatigue along with some chest pain.   He was admi tablet, Rfl: 1  •  ESOMEPRAZOLE MAGNESIUM 20 MG Oral Capsule Delayed Release, TAKE 1 CAPSULE BY MOUTH EVERY DAY IN THE MORNING BEFORE BREAKFAST, Disp: 90 capsule, Rfl: 1    REVIEW OF SYSTEMS:     Comprehensive ROS negative unless noted in HPI    PHYSICAL E concerns addressed. Red flags to RTC or ED reviewed. Patient (or parent) agrees to plan. No follow-ups on file. Erika Tijerina M.D.   Family Medicine   8/31/2020  11:47 AM

## 2020-09-09 NOTE — TELEPHONE ENCOUNTER
Pt was admitted to [facility] on 8/19/2020 and notes states pt's Azathioprine was discontinued.

## 2020-11-01 ENCOUNTER — TELEPHONE (OUTPATIENT)
Dept: FAMILY MEDICINE CLINIC | Facility: CLINIC | Age: 55
End: 2020-11-01

## 2020-11-01 DIAGNOSIS — D86.0 PULMONARY SARCOIDOSIS (HCC): Primary | ICD-10-CM

## 2020-11-01 NOTE — TELEPHONE ENCOUNTER
See form. [facility] form reviewed. Requesting cbc with auto diff, alt and ast monthly and sent to them. Dx: sarcoidosis D86.0    Standing orders for the above monthly.     Fax results monthly to Dr. Palmer Cancer M.D. at [address] [place] [address],[address]    Fax no:

## 2020-11-02 NOTE — TELEPHONE ENCOUNTER
LM informing pt that standing monthly lab orders have been placed by Dr. Henri Cooley per Dr. Deacon Loya request at [facility].   Left scheduling information for pt and informed he can get his labs done monthly, asked pt to call the office with any questions or co

## 2020-11-02 NOTE — TELEPHONE ENCOUNTER
Pt called back and information below provided to pt. Pt was unaware that Dr. Juan Anguiano had requested Dr. Garrido Re order any standing labs, pt states that is why he called back. Central Scheduling phone number provided to pt to schedule lab draw.   Informed pt

## 2020-11-04 ENCOUNTER — LAB ENCOUNTER (OUTPATIENT)
Dept: LAB | Age: 55
End: 2020-11-04
Attending: FAMILY MEDICINE
Payer: COMMERCIAL

## 2020-11-04 DIAGNOSIS — D86.0 PULMONARY SARCOIDOSIS (HCC): ICD-10-CM

## 2020-11-04 PROCEDURE — 36415 COLL VENOUS BLD VENIPUNCTURE: CPT | Performed by: FAMILY MEDICINE

## 2020-11-04 PROCEDURE — 84460 ALANINE AMINO (ALT) (SGPT): CPT | Performed by: FAMILY MEDICINE

## 2020-11-04 PROCEDURE — 84450 TRANSFERASE (AST) (SGOT): CPT | Performed by: FAMILY MEDICINE

## 2020-11-04 PROCEDURE — 85025 COMPLETE CBC W/AUTO DIFF WBC: CPT | Performed by: FAMILY MEDICINE

## 2020-11-30 ENCOUNTER — OFFICE VISIT (OUTPATIENT)
Dept: FAMILY MEDICINE CLINIC | Facility: CLINIC | Age: 55
End: 2020-11-30
Payer: COMMERCIAL

## 2020-11-30 VITALS
HEART RATE: 94 BPM | TEMPERATURE: 99 F | SYSTOLIC BLOOD PRESSURE: 118 MMHG | DIASTOLIC BLOOD PRESSURE: 70 MMHG | BODY MASS INDEX: 29.91 KG/M2 | WEIGHT: 197.38 LBS | HEIGHT: 68 IN

## 2020-11-30 DIAGNOSIS — R07.0 THROAT PAIN: ICD-10-CM

## 2020-11-30 DIAGNOSIS — D86.9 SARCOIDOSIS: ICD-10-CM

## 2020-11-30 DIAGNOSIS — K21.9 GASTROESOPHAGEAL REFLUX DISEASE, UNSPECIFIED WHETHER ESOPHAGITIS PRESENT: Primary | ICD-10-CM

## 2020-11-30 PROCEDURE — 99072 ADDL SUPL MATRL&STAF TM PHE: CPT | Performed by: FAMILY MEDICINE

## 2020-11-30 PROCEDURE — 3074F SYST BP LT 130 MM HG: CPT | Performed by: FAMILY MEDICINE

## 2020-11-30 PROCEDURE — 3078F DIAST BP <80 MM HG: CPT | Performed by: FAMILY MEDICINE

## 2020-11-30 PROCEDURE — 99214 OFFICE O/P EST MOD 30 MIN: CPT | Performed by: FAMILY MEDICINE

## 2020-11-30 PROCEDURE — 3008F BODY MASS INDEX DOCD: CPT | Performed by: FAMILY MEDICINE

## 2020-11-30 RX ORDER — ESOMEPRAZOLE MAGNESIUM 40 MG/1
40 CAPSULE, DELAYED RELEASE ORAL DAILY
Qty: 30 CAPSULE | Refills: 0 | Status: SHIPPED | OUTPATIENT
Start: 2020-11-30 | End: 2020-12-22

## 2020-11-30 RX ORDER — OMEPRAZOLE 40 MG/1
40 CAPSULE, DELAYED RELEASE ORAL
COMMUNITY
Start: 2020-10-06 | End: 2020-11-30

## 2020-11-30 NOTE — PROGRESS NOTES
HPI:   Susannah Wolfe is a 54year old male that presents for heartburn, gerd. He was given prednisone by [facility] to help with sarcoidosis for about 6 wks, then had to stop because of gerd. Had omeprazole but did not help.   He denies any vomi Hydroxychloroquine Sulfate 200 MG Oral Tab, Take 1 tablet (200 mg total) by mouth 2 (two) times daily. , Disp: 180 tablet, Rfl: 1    REVIEW OF SYSTEMS:     Comprehensive ROS negative unless noted in HPI    PHYSICAL EXAM:   /70   Pulse 94   Temp 98.8 ° treatment plan discussed in detail. Questions and concerns addressed. Red flags to RTC or ED reviewed. Patient (or parent) agrees to plan. No follow-ups on file. Ramírez Atkins M.D.   Family Medicine   11/30/2020  10:35 AM

## 2020-12-04 ENCOUNTER — LAB ENCOUNTER (OUTPATIENT)
Dept: LAB | Age: 55
End: 2020-12-04
Attending: FAMILY MEDICINE
Payer: COMMERCIAL

## 2020-12-04 DIAGNOSIS — D86.0 PULMONARY SARCOIDOSIS (HCC): ICD-10-CM

## 2020-12-04 PROCEDURE — 85025 COMPLETE CBC W/AUTO DIFF WBC: CPT | Performed by: FAMILY MEDICINE

## 2020-12-04 PROCEDURE — 84450 TRANSFERASE (AST) (SGOT): CPT | Performed by: FAMILY MEDICINE

## 2020-12-04 PROCEDURE — 84460 ALANINE AMINO (ALT) (SGPT): CPT | Performed by: FAMILY MEDICINE

## 2020-12-04 PROCEDURE — 36415 COLL VENOUS BLD VENIPUNCTURE: CPT | Performed by: FAMILY MEDICINE

## 2020-12-14 ENCOUNTER — OFFICE VISIT (OUTPATIENT)
Dept: FAMILY MEDICINE CLINIC | Facility: CLINIC | Age: 55
End: 2020-12-14
Payer: COMMERCIAL

## 2020-12-14 VITALS
BODY MASS INDEX: 30.31 KG/M2 | OXYGEN SATURATION: 98 % | SYSTOLIC BLOOD PRESSURE: 124 MMHG | HEART RATE: 98 BPM | DIASTOLIC BLOOD PRESSURE: 72 MMHG | HEIGHT: 68 IN | TEMPERATURE: 98 F | WEIGHT: 200 LBS | RESPIRATION RATE: 16 BRPM

## 2020-12-14 DIAGNOSIS — K21.9 GASTROESOPHAGEAL REFLUX DISEASE, UNSPECIFIED WHETHER ESOPHAGITIS PRESENT: ICD-10-CM

## 2020-12-14 DIAGNOSIS — D86.9 SARCOIDOSIS: ICD-10-CM

## 2020-12-14 DIAGNOSIS — Z00.00 ROUTINE ADULT HEALTH MAINTENANCE: Primary | ICD-10-CM

## 2020-12-14 DIAGNOSIS — Z12.11 COLON CANCER SCREENING: ICD-10-CM

## 2020-12-14 PROCEDURE — 3078F DIAST BP <80 MM HG: CPT | Performed by: FAMILY MEDICINE

## 2020-12-14 PROCEDURE — 3008F BODY MASS INDEX DOCD: CPT | Performed by: FAMILY MEDICINE

## 2020-12-14 PROCEDURE — 99396 PREV VISIT EST AGE 40-64: CPT | Performed by: FAMILY MEDICINE

## 2020-12-14 PROCEDURE — 3074F SYST BP LT 130 MM HG: CPT | Performed by: FAMILY MEDICINE

## 2020-12-14 RX ORDER — AZATHIOPRINE 50 MG/1
50 TABLET ORAL DAILY
Status: ON HOLD | COMMUNITY
Start: 2020-08-06 | End: 2021-04-01

## 2020-12-14 NOTE — PROGRESS NOTES
Martha Brito is a 54year old male who presents for a complete physical exam.   HPI:   Pt states that his acid reflux symptoms are much better with Nexium 40 mg daily. He has not needed Pepcid much at all.     Had psa done with urologist, outside group Inhalation Aerosol Powder, Breath Activated Inhale 1 puff into the lungs 2 (two) times daily.  3 Package 3   • FENOFIBRATE MICRONIZED 200 MG Oral Cap TAKE 1 CAPSULE BY MOUTH EVERY DAY 90 capsule 1   • simvastatin 40 MG Oral Tab TAKE 1 TABLET BY MOUTH EVERY Tobacco comment: only socially currently-\"when drinking'    Alcohol use:  Yes      Alcohol/week: 4.0 standard drinks      Types: 4 Standard drinks or equivalent per week      Frequency: 2-4 times a month      Drinks per session: 3 or 4      Binge frequency excursion  CARDIOVASCULAR: RRR, no murmur, no lower extremity edema, pedal and femoral pulses 2+ and symmetric b/l  GI: normoactive BS, non-distended, non-tender to palpation, no HSM/masses/pulsations  MUSCULOSKELETAL: Back with normal AROM, no joint swell or age 36 if family history of colon or prostate cancer, patient instructed to get colon cancer screening and prostate cancer screening done which were discussed in detail. Pt educated on immunizations appropriate for age.      The patient verbalizes un

## 2020-12-21 RX ORDER — SIMVASTATIN 40 MG
TABLET ORAL
Qty: 90 TABLET | Refills: 1 | Status: SHIPPED | OUTPATIENT
Start: 2020-12-21 | End: 2021-02-10 | Stop reason: CLARIF

## 2020-12-21 NOTE — TELEPHONE ENCOUNTER
Cholesterol Medication Protocol [id number]/21/2020 12:42 AM   ALT < 80 Protocol Details    ALT resulted within past year     Lipid panel within past 12 months     Appointment within past 12 or next 3 months      Refill protocol passed because the patient met

## 2020-12-22 RX ORDER — ESOMEPRAZOLE MAGNESIUM 40 MG/1
CAPSULE, DELAYED RELEASE ORAL
Qty: 90 CAPSULE | Refills: 1 | Status: SHIPPED | OUTPATIENT
Start: 2020-12-22 | End: 2021-01-04

## 2020-12-22 NOTE — TELEPHONE ENCOUNTER
Requesting ESOMEPRAZOLE MAGNESIUM 40 MG   LOV: 12/14/20  RTC:   Last Relevant Labs: 8/20/20  Filled: 11/30/20  #30 with 0 refills    Future Appointments   Date Time Provider Tiffanie Do   1/4/2021  9:00 AM REF SO PFLD REF [id number] Ref PFLD 17   1/4/2021

## 2021-01-04 PROBLEM — K76.0 NAFLD (NONALCOHOLIC FATTY LIVER DISEASE): Status: ACTIVE | Noted: 2021-01-04

## 2021-01-06 DIAGNOSIS — D86.9 SARCOID: ICD-10-CM

## 2021-01-06 DIAGNOSIS — E78.2 MIXED HYPERLIPIDEMIA: ICD-10-CM

## 2021-01-06 DIAGNOSIS — R10.30 LOWER ABDOMINAL PAIN: ICD-10-CM

## 2021-01-06 RX ORDER — FENOFIBRATE 200 MG/1
CAPSULE ORAL
Qty: 90 CAPSULE | Refills: 1 | Status: SHIPPED | OUTPATIENT
Start: 2021-01-06 | End: 2021-02-10

## 2021-01-06 NOTE — TELEPHONE ENCOUNTER
Cholesterol Medication Protocol [id number]/06/2021 12:38 AM   ALT < 80 Protocol Details    ALT resulted within past year     Lipid panel within past 12 months     Appointment within past 12 or next 3 months      Refill protocol passed because the patient met

## 2021-02-10 ENCOUNTER — APPOINTMENT (OUTPATIENT)
Dept: GENERAL RADIOLOGY | Facility: HOSPITAL | Age: 56
DRG: 981 | End: 2021-02-10
Attending: EMERGENCY MEDICINE
Payer: COMMERCIAL

## 2021-02-10 ENCOUNTER — HOSPITAL ENCOUNTER (INPATIENT)
Facility: HOSPITAL | Age: 56
LOS: 48 days | Discharge: NURSING, REHAB OR LONG-TERM CARE | DRG: 981 | End: 2021-04-01
Attending: EMERGENCY MEDICINE | Admitting: INTERNAL MEDICINE
Payer: COMMERCIAL

## 2021-02-10 DIAGNOSIS — E87.6 HYPOKALEMIA: ICD-10-CM

## 2021-02-10 DIAGNOSIS — R10.10 UPPER ABDOMINAL PAIN: ICD-10-CM

## 2021-02-10 DIAGNOSIS — R07.9 CHEST PAIN OF UNCERTAIN ETIOLOGY: Primary | ICD-10-CM

## 2021-02-10 DIAGNOSIS — D86.9 SARCOIDOSIS: ICD-10-CM

## 2021-02-10 DIAGNOSIS — K63.1 PERFORATED SIGMOID COLON (HCC): ICD-10-CM

## 2021-02-10 DIAGNOSIS — K86.9 PANCREATIC LESION: ICD-10-CM

## 2021-02-10 DIAGNOSIS — R18.8 RETROPERITONEAL FLUID COLLECTION: ICD-10-CM

## 2021-02-10 PROCEDURE — 99223 1ST HOSP IP/OBS HIGH 75: CPT | Performed by: HOSPITALIST

## 2021-02-10 PROCEDURE — 71045 X-RAY EXAM CHEST 1 VIEW: CPT | Performed by: EMERGENCY MEDICINE

## 2021-02-10 RX ORDER — PANTOPRAZOLE SODIUM 40 MG/1
40 TABLET, DELAYED RELEASE ORAL
Status: DISCONTINUED | OUTPATIENT
Start: 2021-02-11 | End: 2021-03-01

## 2021-02-10 RX ORDER — ACETAMINOPHEN 325 MG/1
650 TABLET ORAL EVERY 6 HOURS PRN
Status: DISCONTINUED | OUTPATIENT
Start: 2021-02-10 | End: 2021-03-03

## 2021-02-10 RX ORDER — HYDROXYCHLOROQUINE SULFATE 200 MG/1
200 TABLET, FILM COATED ORAL 2 TIMES DAILY
Status: DISCONTINUED | OUTPATIENT
Start: 2021-02-10 | End: 2021-02-22

## 2021-02-10 RX ORDER — POLYETHYLENE GLYCOL 3350 17 G/17G
17 POWDER, FOR SOLUTION ORAL DAILY PRN
Status: DISCONTINUED | OUTPATIENT
Start: 2021-02-10 | End: 2021-03-07

## 2021-02-10 RX ORDER — HYDROMORPHONE HYDROCHLORIDE 1 MG/ML
0.5 INJECTION, SOLUTION INTRAMUSCULAR; INTRAVENOUS; SUBCUTANEOUS ONCE
Status: COMPLETED | OUTPATIENT
Start: 2021-02-10 | End: 2021-02-10

## 2021-02-10 RX ORDER — BISACODYL 10 MG
10 SUPPOSITORY, RECTAL RECTAL
Status: DISCONTINUED | OUTPATIENT
Start: 2021-02-10 | End: 2021-03-07

## 2021-02-10 RX ORDER — AZATHIOPRINE 50 MG/1
50 TABLET ORAL DAILY
Status: DISCONTINUED | OUTPATIENT
Start: 2021-02-11 | End: 2021-02-11

## 2021-02-10 RX ORDER — ONDANSETRON 2 MG/ML
4 INJECTION INTRAMUSCULAR; INTRAVENOUS ONCE
Status: COMPLETED | OUTPATIENT
Start: 2021-02-10 | End: 2021-02-10

## 2021-02-10 RX ORDER — ASPIRIN 81 MG/1
324 TABLET, CHEWABLE ORAL ONCE
Status: COMPLETED | OUTPATIENT
Start: 2021-02-10 | End: 2021-02-10

## 2021-02-10 RX ORDER — HYDROMORPHONE HYDROCHLORIDE 1 MG/ML
0.8 INJECTION, SOLUTION INTRAMUSCULAR; INTRAVENOUS; SUBCUTANEOUS EVERY 2 HOUR PRN
Status: DISCONTINUED | OUTPATIENT
Start: 2021-02-10 | End: 2021-02-23

## 2021-02-10 RX ORDER — SIMVASTATIN 40 MG
40 TABLET ORAL NIGHTLY
COMMUNITY

## 2021-02-10 RX ORDER — FENOFIBRATE 200 MG/1
200 CAPSULE ORAL DAILY
COMMUNITY
End: 2021-07-14

## 2021-02-10 RX ORDER — SODIUM CHLORIDE 9 MG/ML
INJECTION, SOLUTION INTRAVENOUS CONTINUOUS
Status: ACTIVE | OUTPATIENT
Start: 2021-02-10 | End: 2021-02-10

## 2021-02-10 RX ORDER — HYDROMORPHONE HYDROCHLORIDE 1 MG/ML
0.4 INJECTION, SOLUTION INTRAMUSCULAR; INTRAVENOUS; SUBCUTANEOUS EVERY 2 HOUR PRN
Status: DISCONTINUED | OUTPATIENT
Start: 2021-02-10 | End: 2021-02-23

## 2021-02-10 RX ORDER — ATORVASTATIN CALCIUM 20 MG/1
20 TABLET, FILM COATED ORAL NIGHTLY
Status: DISCONTINUED | OUTPATIENT
Start: 2021-02-11 | End: 2021-04-01

## 2021-02-10 RX ORDER — HYDROMORPHONE HYDROCHLORIDE 1 MG/ML
0.2 INJECTION, SOLUTION INTRAMUSCULAR; INTRAVENOUS; SUBCUTANEOUS EVERY 2 HOUR PRN
Status: DISCONTINUED | OUTPATIENT
Start: 2021-02-10 | End: 2021-02-23

## 2021-02-10 RX ORDER — MELATONIN
3 NIGHTLY PRN
Status: DISCONTINUED | OUTPATIENT
Start: 2021-02-10 | End: 2021-04-01

## 2021-02-10 RX ORDER — HYDROCHLOROTHIAZIDE 25 MG/1
25 TABLET ORAL DAILY
Status: DISCONTINUED | OUTPATIENT
Start: 2021-02-11 | End: 2021-02-26

## 2021-02-10 RX ORDER — HYDROMORPHONE HYDROCHLORIDE 1 MG/ML
0.5 INJECTION, SOLUTION INTRAMUSCULAR; INTRAVENOUS; SUBCUTANEOUS EVERY 30 MIN PRN
Status: DISCONTINUED | OUTPATIENT
Start: 2021-02-10 | End: 2021-02-10

## 2021-02-10 RX ORDER — ONDANSETRON 2 MG/ML
4 INJECTION INTRAMUSCULAR; INTRAVENOUS EVERY 6 HOURS PRN
Status: DISCONTINUED | OUTPATIENT
Start: 2021-02-10 | End: 2021-02-23

## 2021-02-10 RX ORDER — ONDANSETRON 2 MG/ML
4 INJECTION INTRAMUSCULAR; INTRAVENOUS EVERY 4 HOURS PRN
Status: DISCONTINUED | OUTPATIENT
Start: 2021-02-10 | End: 2021-02-10

## 2021-02-10 RX ORDER — ENOXAPARIN SODIUM 100 MG/ML
40 INJECTION SUBCUTANEOUS DAILY
Status: DISCONTINUED | OUTPATIENT
Start: 2021-02-10 | End: 2021-02-28

## 2021-02-10 RX ORDER — DOCUSATE SODIUM 100 MG/1
100 CAPSULE, LIQUID FILLED ORAL 2 TIMES DAILY
Status: DISCONTINUED | OUTPATIENT
Start: 2021-02-10 | End: 2021-03-01

## 2021-02-10 RX ORDER — POTASSIUM CHLORIDE 20 MEQ/1
40 TABLET, EXTENDED RELEASE ORAL ONCE
Status: COMPLETED | OUTPATIENT
Start: 2021-02-10 | End: 2021-02-10

## 2021-02-10 RX ORDER — NITROGLYCERIN 0.4 MG/1
0.4 TABLET SUBLINGUAL ONCE
Status: COMPLETED | OUTPATIENT
Start: 2021-02-10 | End: 2021-02-10

## 2021-02-10 RX ORDER — SODIUM PHOSPHATE, DIBASIC AND SODIUM PHOSPHATE, MONOBASIC 7; 19 G/133ML; G/133ML
1 ENEMA RECTAL ONCE AS NEEDED
Status: COMPLETED | OUTPATIENT
Start: 2021-02-10 | End: 2021-02-27

## 2021-02-11 ENCOUNTER — APPOINTMENT (OUTPATIENT)
Dept: CT IMAGING | Facility: HOSPITAL | Age: 56
DRG: 981 | End: 2021-02-11
Attending: INTERNAL MEDICINE
Payer: COMMERCIAL

## 2021-02-11 PROCEDURE — 71275 CT ANGIOGRAPHY CHEST: CPT | Performed by: INTERNAL MEDICINE

## 2021-02-11 PROCEDURE — 99232 SBSQ HOSP IP/OBS MODERATE 35: CPT | Performed by: INTERNAL MEDICINE

## 2021-02-11 RX ORDER — POTASSIUM CHLORIDE 20 MEQ/1
40 TABLET, EXTENDED RELEASE ORAL ONCE
Status: COMPLETED | OUTPATIENT
Start: 2021-02-11 | End: 2021-02-11

## 2021-02-11 RX ORDER — METHYLPREDNISOLONE SODIUM SUCCINATE 125 MG/2ML
125 INJECTION, POWDER, LYOPHILIZED, FOR SOLUTION INTRAMUSCULAR; INTRAVENOUS EVERY 6 HOURS
Status: DISCONTINUED | OUTPATIENT
Start: 2021-02-11 | End: 2021-02-18

## 2021-02-11 RX ORDER — HYDROCODONE BITARTRATE AND ACETAMINOPHEN 5; 325 MG/1; MG/1
1 TABLET ORAL EVERY 6 HOURS PRN
Status: DISCONTINUED | OUTPATIENT
Start: 2021-02-11 | End: 2021-02-23

## 2021-02-11 NOTE — H&P
[facility]  History and Physical     Alanis Rod Patient Status:  Observation    1965 MRN [id number]   [facility] 8NE-A Attending Niki Carpio MD   Hosp Day # 0 PCP Zion Lang MD     Chief Complaint: Chest nadiya Over the last 2 weeks, how often have you been bothered by the following problems?         PHQ2 Score:  2  1. Little interest or pleasure in doing things?:  More than half the days  2. Feeling down, depressed, or hopeless?:  Not at all          He has never used smokeless tobacco. He reports current alcohol use of about 4.0 standard drinks of alcohol per week. He reports that he does not use drugs.     Family History:   Family History   Problem Relation Age of Onset   • Heart Disorder Father    • JVD. No carotid bruits. Respiratory: Clear to auscultation bilaterally. No wheezes. No rhonchi. Cardiovascular: S1, S2. Regular rate and rhythm. No murmurs, rubs or gallops. Equal pulses. Chest and Back: No tenderness or deformity.   Abdomen: Soft, nont

## 2021-02-11 NOTE — ED PROVIDER NOTES
Patient Seen in: [facility] Emergency Department      History   Patient presents with:  Chest Pain Angina  Difficulty Breathing    Stated Complaint: chest pain    HPI/Subjective:   HPI    24-year-old male with a history of sarcoidosis, history of hy COLONOSCOPY     • COLONOSCOPY  11/2015    4 small adeonomas, diverticulosis. repeat 2018   • EGD  11/2015     schatzki's ring, non obstructive. Erosive esophagitis, LA grad 1 (no barretts on path).  Gastritis, no hpylori on path   • NEEDLE BIOPSY LIVER Notable for the following components:       Result Value    Glucose 104 (*)     Potassium 3.1 (*)     All other components within normal limits   CBC W/ DIFFERENTIAL - Abnormal; Notable for the following components:    WBC 12.8 (*)     Neutrophil Absolute emergency department observation period. Consultation obtained with pulmonary medicine Dr. Lizbeth Isaacs. Recommend the patient be admitted for further evaluation. Patient will be admitted to cardiac telemetry floor. EM[facility] Hospitalist consulted.     Valentine Kingston

## 2021-02-11 NOTE — PROGRESS NOTES
Pt arrived to unit at 2145. Pt in pain, SOB. O2 saturations 98% on room air. Oriented to room. Call light within reach. Pt is A/Ox4. VSS. NSR. Pain is located in \"lungs\" per pt, wraps around both sides of chest. 5/10.  Pain is better when pt is standi

## 2021-02-11 NOTE — CONSULTS
[id number] [facility] Patient Status:  [facility]    1965 MRN [id number]   [facility] 8NE-A Attending Kristyn Singh, DO   Hosp Day # 0 PCP Justina Evans MD     Date of Admission: 2/10/2021  7:25 PM  Admission Diagnosi Nathalie Welch MD at [address] [facility] ENDOSCOPY   • COLONOSCOPY     • COLONOSCOPY  11/2015    4 small adeonomas, diverticulosis. repeat 2018   • EGD  11/2015     schatzki's ring, non obstructive. Erosive esophagitis, LA grad 1 (no barretts on path).  Gastritis, no hpylori on pat Medications:    Current Facility-Administered Medications:   •  fenofibrate micronized (LOFIBRA) cap 201 mg, 201 mg, Oral, QAM AC  •  Fluticasone Furoate-Vilanterol (BREO ELLIPTA) 200-25 MCG/INH inhaler 1 puff, 1 puff, Inhalation, Daily  •  hydrochlorothia 02/10/21 2145 122/76 98.1 °F (36.7 °C) Oral 79 18 96 % — —   02/10/21 2100 125/87 — — 85 25 98 % — —   02/10/21 2030 132/82 — — 84 22 99 % — —   02/10/21 2000 130/86 — — 90 24 98 % — —   02/10/21 1945 (!) 139/93 — — 91 16 99 % — —   02/10/21 1932 (!) 147 CREATSERUM 1.05 02/10/2021     02/10/2021    CA 9.4 02/10/2021    ALKPHO 82 02/10/2021    ALT 31 02/10/2021    AST 17 02/10/2021    BILT 0.4 02/10/2021    ALB 4.2 02/10/2021    TP 7.3 02/10/2021     Lab Results   Component Value Date    INR 0.93

## 2021-02-11 NOTE — PLAN OF CARE
Assumed care of pt at 0730  A&Ox4, up ad marine with a steady gait, complaining of 8/10 chest pain related to sarcoidosis flair up, pain relieved to 5/10 with PRN dilaudid every 2 hours, pt states this is a manageable level of pain and declines additional nadiya on oxygen saturation or ABGs  - Provide Smoking Cessation handout, if applicable  - Encourage broncho-pulmonary hygiene including cough, deep breathe, Incentive Spirometry  - Assess the need for suctioning and perform as needed  - Assess and instruct to re

## 2021-02-11 NOTE — PROGRESS NOTES
[facility]  Progress Note     Maurie Dance Patient Status:  Observation    1965 MRN [id number]   [facility] 8NE-A Attending Kiersten Venegas,    Hosp Day # 0 PCP Lemuel Burks MD     Chief Complaint: Chest pain, SOB    S: 02/10/21  1932   PTP 12.8   INR 0.93     Recent Labs   Lab 02/10/21  1932   TROP <0.045        Imaging: Imaging data reviewed in Epic.     Medications:   • MethylPREDNISolone Sodium Succ  125 mg Intravenous Q6H   • fenofibrate micronized  201 mg Oral QAM AC

## 2021-02-12 PROCEDURE — 99232 SBSQ HOSP IP/OBS MODERATE 35: CPT | Performed by: INTERNAL MEDICINE

## 2021-02-12 RX ORDER — POTASSIUM CHLORIDE 20 MEQ/1
40 TABLET, EXTENDED RELEASE ORAL ONCE
Status: COMPLETED | OUTPATIENT
Start: 2021-02-12 | End: 2021-02-12

## 2021-02-12 RX ORDER — SULFAMETHOXAZOLE AND TRIMETHOPRIM 800; 160 MG/1; MG/1
1 TABLET ORAL EVERY 12 HOURS SCHEDULED
Status: DISCONTINUED | OUTPATIENT
Start: 2021-02-12 | End: 2021-02-15

## 2021-02-12 RX ORDER — BENZONATATE 100 MG/1
100 CAPSULE ORAL 3 TIMES DAILY PRN
Status: DISCONTINUED | OUTPATIENT
Start: 2021-02-12 | End: 2021-04-01

## 2021-02-12 NOTE — PLAN OF CARE
Pt is A/Ox4. VSS. NSR. Room air, maintaining O2 sats. Chest/lung pain related to sarcoidosis. Pt receiving dilaudid and norco for pain management. Up ad marine, ambulates, urinating. No c/o cardiac symptoms. Updated on plan of care, all needs attended to. Manage/alleviate anxiety  - Monitor for signs/symptoms of CO2 retention  Outcome: Progressing

## 2021-02-12 NOTE — PROGRESS NOTES
[facility]  Progress Note     Luda Pickett Patient Status:  Observation    1965 MRN [id number]   [facility] 8NE-A Attending Van Comer, DO   Hosp Day # 0 PCP Megan Salazar MD     Chief Complaint: Chest pain, SOB    S: of 1.05 mg/dL). Recent Labs   Lab 02/10/21  1932   PTP 12.8   INR 0.93     Recent Labs   Lab 02/10/21  1932   TROP <0.045        Imaging: Imaging data reviewed in Epic.     Medications:   • MethylPREDNISolone Sodium Succ  125 mg Intravenous Q6H   • fenof

## 2021-02-12 NOTE — PROGRESS NOTES
[address] Patient Status:  Inpatient    1965 MRN [id number]   [facility] 8NE-A Attending Pablo Amaya, DO   Hosp Day # 0 PCP April Toussaint MD     SUBJECTIVE: still feels about the same.  Having coughing fit sodium (COLACE) cap 100 mg, 100 mg, Oral, BID  •  PEG 3350 (MIRALAX) powder packet 17 g, 17 g, Oral, Daily PRN  •  magnesium hydroxide (MILK OF MAGNESIA) 400 MG/5ML suspension 30 mL, 30 mL, Oral, Daily PRN  •  bisacodyl (DULCOLAX) rectal suppository 10 mg,

## 2021-02-12 NOTE — PLAN OF CARE
Aox4, room air, complains of pain with coughing and SOB, dilaudid and Norco for pain, VSS, NSR on tele, IV solumedrol, safety precautions in place, will continue to monitor the pt.     Problem: CARDIOVASCULAR - ADULT  Goal: Maintains optimal cardiac output Progressing

## 2021-02-13 PROCEDURE — 99232 SBSQ HOSP IP/OBS MODERATE 35: CPT | Performed by: INTERNAL MEDICINE

## 2021-02-13 NOTE — PROGRESS NOTES
Pulmonary Progress Note        NAME: Tricia Farley - ROOM: 2597/1768-N - MRN: [id number] - Age: 54year old - : 1965        Last 24hrs: No events overnight, remains SOB though able to walk in the halls    OBJECTIVE:   21  0017 21  09 infectious process  - ESR/CRP both mostly normal.  Not impressive  -LDH wnl  -sputum culture negative  - will continue with empiric course of steroids with solumedrol  - CTA chest did not show PE but did show increase in GGOs  - has been on immunosuppressi

## 2021-02-13 NOTE — PLAN OF CARE
Alert and oriented x4 on tele monitor hr 90's sinus rhythm. Heplock on right ac patent and intact. Dilaudid 0.4 mg ivp given as ordered. Updated w/ poc and verbalized understanding. All needs attended and will continue to monitor. Call light within reach. Manage/alleviate anxiety  - Monitor for signs/symptoms of CO2 retention  Outcome: Progressing

## 2021-02-13 NOTE — PROGRESS NOTES
[facility]  Progress Note     Neptali Vargas Patient Status:  Observation    1965 MRN [id number]   [facility] 8NE-A Attending Daniel Urbina, [address] [facility] Day # 1 PCP Heidy Thompson MD     Chief Complaint: Chest pain, SOB    S: --    BILT 0.4  --   --   --    TP 7.3  --   --   --      Estimated Creatinine Clearance: 76.9 mL/min (based on SCr of 1.05 mg/dL).     Recent Labs   Lab 02/10/21  1932   PTP 12.8   INR 0.93     Recent Labs   Lab 02/10/21  1932   TROP <0.045        Imaging:

## 2021-02-13 NOTE — PLAN OF CARE
PT AOX4, PAIN 3-4/10 WHILE TAKING DILAUDID AND NORCO. SR ON TELE. VSS. PT ON ROOM AIR WITH O2 SAT 93%. OCC DRY COUGH. CONTINUE TO MONITOR CLOSELY. POSS BRONCHOSCOPY Monday.    Problem: CARDIOVASCULAR - ADULT  Goal: Maintains optimal cardiac output and hemod Progressing

## 2021-02-14 PROCEDURE — 99232 SBSQ HOSP IP/OBS MODERATE 35: CPT | Performed by: INTERNAL MEDICINE

## 2021-02-14 RX ORDER — KETOROLAC TROMETHAMINE 30 MG/ML
30 INJECTION, SOLUTION INTRAMUSCULAR; INTRAVENOUS EVERY 6 HOURS PRN
Status: DISPENSED | OUTPATIENT
Start: 2021-02-14 | End: 2021-02-16

## 2021-02-14 NOTE — PROGRESS NOTES
Pulmonary Progress Note      NAME: Neptali Vargas - ROOM: 5767/1735-U - MRN: [id number] - Age: 54year old - : 1965    Assessment/Plan:  1.  Atypical chest pain- could be sarcoidosis flair triggering pleurisy vs atypical infectious process  - CRP MMM  Neck: Supple, no adenopathy or elevation in JVP  Cardiovascular: RRR, no murmurs or extra heart sounds   Respiratory: Basilar crackles   GI: Soft, NTND, +normoactive BS   Ext: No cyanosis, clubbing or edema     Recent Labs   Lab 02/11/21  0912   RBC 5

## 2021-02-14 NOTE — PLAN OF CARE
Alert and oriented x4 on tele monitor hr 80's sinus rhythm. Dilaudid 0.4 mg ivp given for pain with relief. Updated w/ poc and verbalized understanding. All needs attended and will continue to monitor. Call light within reach.   Problem: CARDIOVASCULAR - AD for signs/symptoms of CO2 retention  Outcome: Progressing

## 2021-02-14 NOTE — PROGRESS NOTES
Immediate Brief Procedure Note    Patient: Aly Mcbride    Pre-op Dx: Endobronchial lesion    Post-op Dx: Same    Procedure:  Bronch with endobronchial biopsy and BAL; repositioning of ETT    Surgeon:  Remberto Salcedo MD    Assistants: none    Anesthesia Staff: No anesthesia staff entered.    Anesthesia Type: Versed 12 mg, fentanyl 400 mcg.  Start 2892-1233    Findings: Endobronchial lesion in ROSA MARIA apical posterior segment    Estimated Blood Loss: none    Complications: none    Specimens Removed: ROSA MARIA endobronchial lesion and ROSA MARIA BAL   [facility]ITAL[facility]  Progress Note     Cayetano Virk Patient Status:  Observation    1965 MRN [id number]   [facility] 8NE-A Attending Florence Martines, [address] [facility] Day # 2 PCP Daisha Llanos MD     Chief Complaint: Chest pain, SOB    S: --   --    AST 17  --   --   --    ALT 31  --   --   --    BILT 0.4  --   --   --    TP 7.3  --   --   --      Estimated Creatinine Clearance: 76.9 mL/min (based on SCr of 1.05 mg/dL).     Recent Labs   Lab 02/10/21  1932   PTP 12.8   INR 0.93     Recent L Home    Plan of care discussed with patient, RN.     Natalie Shea DO

## 2021-02-14 NOTE — PROGRESS NOTES
Patient c/o chest pressure 7/10 midsternal chest ,diladid 0.4 mg given at 11:30 with no relief.  Pt resting in bed will notify MD     Blood pressure 128/71 HR 91  EKG   Will notify MD

## 2021-02-15 ENCOUNTER — ANESTHESIA EVENT (OUTPATIENT)
Dept: ENDOSCOPY | Facility: HOSPITAL | Age: 56
DRG: 981 | End: 2021-02-15
Payer: COMMERCIAL

## 2021-02-15 PROCEDURE — 99232 SBSQ HOSP IP/OBS MODERATE 35: CPT | Performed by: INTERNAL MEDICINE

## 2021-02-15 RX ORDER — SODIUM CHLORIDE 9 MG/ML
INJECTION, SOLUTION INTRAVENOUS CONTINUOUS
Status: DISCONTINUED | OUTPATIENT
Start: 2021-02-15 | End: 2021-02-16

## 2021-02-15 RX ORDER — SULFAMETHOXAZOLE AND TRIMETHOPRIM 800; 160 MG/1; MG/1
2 TABLET ORAL 3 TIMES DAILY
Status: DISCONTINUED | OUTPATIENT
Start: 2021-02-15 | End: 2021-02-19

## 2021-02-15 NOTE — PROGRESS NOTES
[facility]  Progress Note     Niraj Bolanos Patient Status:  Observation    1965 MRN [id number]   [facility] 8NE-A Attending Leela Springer, DO   Hosp Day # 3 PCP Joyce Johnson MD     Chief Complaint: Chest pain, SOB    S: --   --   --  102   CO2 29.0  --   --   --  31.0   ALKPHO 82  --   --   --   --    AST 17  --   --   --   --    ALT 31  --   --   --   --    BILT 0.4  --   --   --   --    TP 7.3  --   --   --   --     < > = values in this interval not displayed.      Estim Lovenox  · CODE status: Full  · Hyde: No  · Central line: No    Will the patient be referred to TCC on discharge?: No  Estimated date of discharge: TBD  Discharge is dependent on: Clinical status, pulm recs  At this point Mr. Dre Thapa is expected to be [facility]

## 2021-02-15 NOTE — PLAN OF CARE
Assumed care of pt at 0730  A&Ox4, up ad marine with a steady gait, complaining of 6/10 pain related to sarcoidosis flare relieved by 0.4 of dilaudid  2L NC, , no cardiac related chest pain, no shortness of breath although pt endorses difficulty taking siobhan including cough, deep breathe, Incentive Spirometry  - Assess the need for suctioning and perform as needed  - Assess and instruct to report SOB or any respiratory difficulty  - Respiratory Therapy support as indicated  - Manage/alleviate anxiety  - Monito

## 2021-02-15 NOTE — PLAN OF CARE
A&Ox4.  C/o chest pain in mid chest. Explains that the pain increases as he inhales. PRN pain medication given as ordered. Medication controlling pain well. O2 sat 87-89% on RA while awake. 2L applied. O2 sat increased to 97%. .  Desat charted-see note respiratory difficulty  - Respiratory Therapy support as indicated  - Manage/alleviate anxiety  - Monitor for signs/symptoms of CO2 retention  Outcome: Progressing

## 2021-02-15 NOTE — ANESTHESIA PREPROCEDURE EVALUATION
PRE-OP EVALUATION    Patient Name: Martha Brito    Pre-op Diagnosis:atypical chest pain    Procedure(s):  BRONCHOSCOPY WITH endobronchial ultrasound    Surgeon(s) and Role:     Tona Harris MD     Pre-op vitals reviewed.   Temp: 97.5 °F (36.4 °C)  P (MIRALAX) powder packet 17 g, 17 g, Oral, Daily PRN    •  magnesium hydroxide (MILK OF MAGNESIA) 400 MG/5ML suspension 30 mL, 30 mL, Oral, Daily PRN    •  bisacodyl (DULCOLAX) rectal suppository 10 mg, 10 mg, Rectal, Daily PRN    •  Fleet Enema (FLEET) 7-1 process  - CRP mildly elevated  -LDH wnl  -sputum culture with normal ricardo  - will continue with empiric course of steroids with solumedrol  - CTA chest did not show PE but did show increase in GGOs  - has been on immunosuppression- ?  Opportunistic infect 02/15/2021    CA 8.6 02/15/2021     Lab Results   Component Value Date    INR 1.01 02/15/2021         Airway      Mallampati: II  Mouth opening: >3 FB  TM distance: > 6 cm  Neck ROM: full Cardiovascular    Cardiovascular exam normal.  Rhythm: regular  Rate

## 2021-02-15 NOTE — PROGRESS NOTES
02/14/21 2346 02/14/21 2347 02/14/21 2348   Oxygen Therapy   SpO2 (!) 88 % (!) 88 % (!) 88 %   O2 Device None (Room air) None (Room air) None (Room air)   O2 Flow Rate (L/min)  --   --   --    Pulse Oximetry Type  --   --   --    Pulse Ox Probe Location

## 2021-02-15 NOTE — PROGRESS NOTES
[address] [facility] Patient Status:  Inpatient    1965 MRN [id number]   [facility] 8NE-A Attending Jorge Garcia, [address] [facility] Day # 3 PCP Maris Ye MD     Pulm / Critical Care Progress Note     S: feels about the s Labs   Lab 02/10/21  1932 02/11/21  0912 02/15/21  0445   WBC 12.8* 11.0 16.2*   HGB 16.6 15.6 14.2   HCT 46.7 45.6 42.9   .0 193.0 210.0     Recent Labs   Lab 02/10/21  1932 02/15/21  0445   INR 0.93 1.01         Recent Labs   Lab 02/10/21  1932 02

## 2021-02-15 NOTE — PAYOR COMM NOTE
--------------  ADMISSION REVIEW     Payor: JAD KLINE  Subscriber #:  [id number]  Authorization Number: [id number]    Admit date: 2/12/21  Admit time: 7912       Patient Seen in: [facility] Emergency Department    Admit Orders (From admission, onward No focal neurological deficits. CNII-XII grossly intact. Musculoskeletal: Moves all extremities. Extremities: No edema or cyanosis. Integument: No rashes or lesions. Psychiatric: Appropriate mood and affect. Lab 02/10/21  1932   WBC 12.8*   HGB 16. 16.6 15.6   MCV 87.6 89.2   .0 193.0   INR 0.93  --       Lab 02/10/21  1932 02/11/21  0511   *  --    BUN 12  --    CREATSERUM 1.05  --    GFRAA 92  --    GFRNAA 80  --    CA 9.4  --    ALB 4.2  --      --    K 3.1* 3.8     -- 02/11/21  0912   WBC 12.8* 11.0   HGB 16.6 15.6   MCV 87.6 89.2   .0 193.0   INR 0.93  --       Lab 02/10/21  1932 02/11/21  0511 02/12/21  0717 02/13/21  0534   *  --   --   --    BUN 12  --   --   --    CREATSERUM 1.05  --   --   --    GFRA weekend consider inspection bronchoscopy for BAL to r/o PJP, would also consider EBUS due to enlarged nodes   -sputum for PJP needs to be sent  - cont empiric bactrim  2.  JEANETTE- on CPAP  3. proph- LMWH  4. Dispo- Full code        2/14/21  Chief Complaint: Analilia Tejada cx negative. Follow up sputum PJP. May need bronch tomorrow   3. Pulmonology on consult  4. Continue home plaquenil. No longer on imuran  5. Allergic to prednisone, started on solumedrol  6. Pain and nausea meds ordered. Will see if toradol helps  7.  Con Maci Laci Preciado, RN    2/14/2021 1134 Given 0.4 mg Intravenous Erinn Casillas RN      Hydroxychloroquine Sulfate (PLAQUENIL) tab 200 mg     Date Action Dose Route User    2/14/2021 2056 Given 200 mg Oral Abraham Musa RN    2/14/2021 0950 Given 200 mg Oral

## 2021-02-16 ENCOUNTER — ANESTHESIA (OUTPATIENT)
Dept: ENDOSCOPY | Facility: HOSPITAL | Age: 56
DRG: 981 | End: 2021-02-16
Payer: COMMERCIAL

## 2021-02-16 PROCEDURE — 0B9G8ZX DRAINAGE OF LEFT UPPER LUNG LOBE, VIA NATURAL OR ARTIFICIAL OPENING ENDOSCOPIC, DIAGNOSTIC: ICD-10-PCS | Performed by: INTERNAL MEDICINE

## 2021-02-16 PROCEDURE — 99232 SBSQ HOSP IP/OBS MODERATE 35: CPT | Performed by: INTERNAL MEDICINE

## 2021-02-16 PROCEDURE — 07D78ZX EXTRACTION OF THORAX LYMPHATIC, VIA NATURAL OR ARTIFICIAL OPENING ENDOSCOPIC, DIAGNOSTIC: ICD-10-PCS | Performed by: INTERNAL MEDICINE

## 2021-02-16 RX ORDER — HYDROCODONE BITARTRATE AND ACETAMINOPHEN 5; 325 MG/1; MG/1
1 TABLET ORAL AS NEEDED
Status: DISCONTINUED | OUTPATIENT
Start: 2021-02-16 | End: 2021-02-16 | Stop reason: HOSPADM

## 2021-02-16 RX ORDER — ONDANSETRON 2 MG/ML
INJECTION INTRAMUSCULAR; INTRAVENOUS AS NEEDED
Status: DISCONTINUED | OUTPATIENT
Start: 2021-02-16 | End: 2021-02-16 | Stop reason: SURG

## 2021-02-16 RX ORDER — NALOXONE HYDROCHLORIDE 0.4 MG/ML
80 INJECTION, SOLUTION INTRAMUSCULAR; INTRAVENOUS; SUBCUTANEOUS AS NEEDED
Status: DISCONTINUED | OUTPATIENT
Start: 2021-02-16 | End: 2021-02-16 | Stop reason: HOSPADM

## 2021-02-16 RX ORDER — ONDANSETRON 2 MG/ML
4 INJECTION INTRAMUSCULAR; INTRAVENOUS AS NEEDED
Status: DISCONTINUED | OUTPATIENT
Start: 2021-02-16 | End: 2021-02-16 | Stop reason: HOSPADM

## 2021-02-16 RX ORDER — HYDROMORPHONE HYDROCHLORIDE 1 MG/ML
0.4 INJECTION, SOLUTION INTRAMUSCULAR; INTRAVENOUS; SUBCUTANEOUS EVERY 5 MIN PRN
Status: DISCONTINUED | OUTPATIENT
Start: 2021-02-16 | End: 2021-02-16 | Stop reason: HOSPADM

## 2021-02-16 RX ORDER — HYDROCODONE BITARTRATE AND ACETAMINOPHEN 5; 325 MG/1; MG/1
2 TABLET ORAL AS NEEDED
Status: DISCONTINUED | OUTPATIENT
Start: 2021-02-16 | End: 2021-02-16 | Stop reason: HOSPADM

## 2021-02-16 RX ORDER — LIDOCAINE HYDROCHLORIDE 10 MG/ML
INJECTION, SOLUTION EPIDURAL; INFILTRATION; INTRACAUDAL; PERINEURAL AS NEEDED
Status: DISCONTINUED | OUTPATIENT
Start: 2021-02-16 | End: 2021-02-16 | Stop reason: SURG

## 2021-02-16 RX ORDER — SODIUM CHLORIDE, SODIUM LACTATE, POTASSIUM CHLORIDE, CALCIUM CHLORIDE 600; 310; 30; 20 MG/100ML; MG/100ML; MG/100ML; MG/100ML
INJECTION, SOLUTION INTRAVENOUS CONTINUOUS
Status: DISCONTINUED | OUTPATIENT
Start: 2021-02-16 | End: 2021-02-16 | Stop reason: HOSPADM

## 2021-02-16 RX ORDER — LIDOCAINE HYDROCHLORIDE 20 MG/ML
INJECTION, SOLUTION EPIDURAL; INFILTRATION; INTRACAUDAL; PERINEURAL
Status: DISCONTINUED | OUTPATIENT
Start: 2021-02-16 | End: 2021-02-16 | Stop reason: HOSPADM

## 2021-02-16 RX ADMIN — ONDANSETRON 4 MG: 2 INJECTION INTRAMUSCULAR; INTRAVENOUS at 07:55:00

## 2021-02-16 RX ADMIN — LIDOCAINE HYDROCHLORIDE 100 MG: 10 INJECTION, SOLUTION EPIDURAL; INFILTRATION; INTRACAUDAL; PERINEURAL at 07:48:00

## 2021-02-16 NOTE — OPERATIVE REPORT
Bronchoscopy procedure report    Preop diagnosis: abnl ct  Postop diagnosis:  same  Procedure performed: Bronchoscopy, Diagnostic  Bronchoalveolar lavage, BAL  tbna with ebus guidance    Sedation used:  general anesthesia    Description of procedure: Infor

## 2021-02-16 NOTE — ANESTHESIA PROCEDURE NOTES
Airway  Urgency: elective      General Information and Staff    Patient location during procedure: OR  Anesthesiologist: Yvette Patel MD  Performed: anesthesiologist     Indications and Patient Condition  Indications for airway management: anesthesia  S

## 2021-02-16 NOTE — ANESTHESIA POSTPROCEDURE EVALUATION
[address] [facility] Patient Status:  Inpatient   Age/Gender 54year old male MRN [id number]   Location [address] [facility] Attending Sagrario Martins, [address] [facility] Day # 4 PCP Alton Lora MD       Anesthesia Post-op Note

## 2021-02-16 NOTE — PLAN OF CARE
Assumed care of pt at 0730  A&Ox4, up ad marine with a steady gait, complaining of 7/10 pain in the lungs related to sarcoidosis flare  R/A, 2L NC as needed, NSR, no cardiac chest pain, no shortness of breath at rest although pt endorses dyspnea upon exertion deep breathe, Incentive Spirometry  - Assess the need for suctioning and perform as needed  - Assess and instruct to report SOB or any respiratory difficulty  - Respiratory Therapy support as indicated  - Manage/alleviate anxiety  - Monitor for signs/sympt

## 2021-02-16 NOTE — PLAN OF CARE
Assumed care of pt around 1930. Patient up in bed. Ambulated around unit before bed. Alert and oriented x4. C/o chest pain d/t sarcoidosis, 8/10, given pain medications- See MAR. On RA, O2 sats above 91- pt refuses CPAP at night. NSR on tele. Up ad marine.  IV any respiratory difficulty  - Respiratory Therapy support as indicated  - Manage/alleviate anxiety  - Monitor for signs/symptoms of CO2 retention  Outcome: Progressing     Problem: PAIN - ADULT  Goal: Verbalizes/displays adequate comfort level or patient's

## 2021-02-16 NOTE — PROGRESS NOTES
[facility] HOSPITAL[facility]  Progress Note     Luda Pickett Patient Status:  Observation    1965 MRN [id number]   [facility] 8NE-A Attending Van Comer, DO   Hosp Day # 4 PCP Megan Salazar MD     Chief Complaint: Chest pain, SOB    S: K 3.1*   < > 4.0 4.0 3.9     --   --  102 101   CO2 29.0  --   --  31.0 33.0*   ALKPHO 82  --   --   --   --    AST 17  --   --   --   --    ALT 31  --   --   --   --    BILT 0.4  --   --   --   --    TP 7.3  --   --   --   --     < > = values in t AM    Quality:  · DVT Prophylaxis: Lovenox  · CODE status: Full  · Hyde: No  · Central line: No    Will the patient be referred to TCC on discharge?: No  Estimated date of discharge: TBD  Discharge is dependent on: Clinical status, pulm recs  At this poin

## 2021-02-16 NOTE — PAYOR COMM NOTE
--------------  CONTINUED STAY REVIEW    Payor: JAD KLINE  Subscriber #:  [id number]  Authorization Number: [id number]    Admit date: 2/12/21  Admit time: 65    FAXING CLINICAL UPDATE FOR 2/15/21-2/16/21    2/15/21   Chief Complaint: Chest pain, SOB    consulted  2. Troponin and D-dimer negative. EKG without acute ischemia  3. PCT negative but with increased GGO, pulmonology started on bactrim due to concern for opportunistic infection in setting of immunosuppression. Sputum cx negative.  Follow up sputum 1.43* 1.19   GFRAA 92  --   --  63 79   GFRNAA 80  --   --  55* 68   CA 9.4  --   --  8.6 9.3   ALB 4.2  --   --   --   --      --   --  141 141   K 3.1*   < > 4.0 4.0 3.9     --   --  102 101   CO2 29.0  --   --  31.0 33.0*   ALKPHO 82  --   - 40 mg Subcutaneous (Right Lower Abdomen) Guillaume Alcala RN         Fluticasone Furoate-Vilanterol (BREO ELLIPTA) 200-25 MCG/INH inhaler 1 puff     Date Action Dose Route User    2/16/2021 0935 Given 1 puff Inhalation Stefanie Murphy RN      hydrochlorothiazid (none) Intravenous Deanna Suarez RN         Sulfamethoxazole-TMP DS (BACTRIM DS) 800-160 MG per tab 2 tablet     Date Action Dose Route User    2/16/2021 3552 Given 2 tablet Oral Grimm Monday, RN    2/15/2021 2025 Given 2 tablet Oral Grimm Monday, RN

## 2021-02-16 NOTE — PROGRESS NOTES
[address] [address] Patient Status:  Inpatient    1965 MRN [id number]   [facility] ENDOSCOPY Attending Stephen Cheng, [address] [facility] Day # 4 PCP Erika Tijerina MD     Pulm / Critical Care Progress Note     S: feels the genevieve normal S1S2, no murmur. Abdomen: soft, non-tender, non-distended, positive BS.    Extremity: no edema       Recent Labs   Lab 02/10/21  1932 02/11/21  0912 02/15/21  0445   WBC 12.8* 11.0 16.2*   HGB 16.6 15.6 14.2   HCT 46.7 45.6 42.9   .0 193.0 2

## 2021-02-17 PROCEDURE — 99232 SBSQ HOSP IP/OBS MODERATE 35: CPT | Performed by: INTERNAL MEDICINE

## 2021-02-17 NOTE — PLAN OF CARE
Assumed care of pt at 0730  A&Ox4, up ad marine with a steady gait, complaining of 6-7/10 pain in the lungs related to sarcoidosis  R/A, NSR, no cardiac-related chest pain, no shortness of breath at rest, pt endorses difficulty taking deep breaths due to pain breathe, Incentive Spirometry  - Assess the need for suctioning and perform as needed  - Assess and instruct to report SOB or any respiratory difficulty  - Respiratory Therapy support as indicated  - Manage/alleviate anxiety  - Monitor for signs/symptoms o

## 2021-02-17 NOTE — PROGRESS NOTES
[facility]  Progress Note     Susannah Wolfe Patient Status:  Observation    1965 MRN [id number]   [facility] 8NE-A Attending Brayan Zheng DO   Hosp Day # 5 PCP Nohemi Garner MD     Chief Complaint: Chest pain, SOB    S: K 3.1*   < > 4.0 3.9 4.2     --  102 101 100   CO2 29.0  --  31.0 33.0* 32.0   ALKPHO 82  --   --   --   --    AST 17  --   --   --   --    ALT 31  --   --   --   --    BILT 0.4  --   --   --   --    TP 7.3  --   --   --   --     < > = values in th line: No    Will the patient be referred to TCC on discharge?: No  Estimated date of discharge: TBD  Discharge is dependent on: Clinical status, pulm recs  At this point Mr. Raymundo Aj is expected to be discharge to: Home    Plan of care discussed with louise

## 2021-02-17 NOTE — PROGRESS NOTES
Pulmonary Progress Note      NAME: Alanis Rod - ROOM: [facility]8034- - MRN: [id number] - Age: 54year old - : 1965    Assessment/Plan:  1.  Atypical chest pain- could be sarcoidosis flair triggering pleurisy vs atypical infectious process  - CRP no murmur. Abdomen: soft, non-tender, non-distended, positive BS.    Extremity: no edema   Skin: no new rash   Neuro: normal    Recent Labs   Lab 02/17/21  0532   RBC 4.62   HGB 14.1   HCT 41.6   MCV 90.0   MCH 30.5   MCHC 33.9   RDW 12.2   NEPRELIM 11.47

## 2021-02-17 NOTE — PLAN OF CARE
Assumed care of pt around 1930. Patient up in bed. Alert and oriented x4. Denies any chest pain, lightheadedness or shortness of breath. On room air, 2L O2 as needed. NSR on tele. Pt c/o pain in the lungs- given pain medication, see MAR. Up ad marine.  Fall pr signs, rhythm, and trends  - Monitor for bleeding, hypotension and signs of decreased cardiac output  - Evaluate effectiveness of vasoactive medications to optimize hemodynamic stability  - Monitor arterial and/or venous puncture sites for bleeding and/or

## 2021-02-17 NOTE — DIETARY NOTE
[address]     Admitting diagnosis:  Sarcoidosis [D86.9]  Hypokalemia [E87.6]  Chest pain of uncertain etiology [D66.6]    Ht: 172.7 cm (5' 8\")  Wt: 92.7 kg (204 lb 6.4 oz).    BMI: Body mass index is 31.08 kg/

## 2021-02-18 PROCEDURE — 99232 SBSQ HOSP IP/OBS MODERATE 35: CPT | Performed by: INTERNAL MEDICINE

## 2021-02-18 RX ORDER — METHYLPREDNISOLONE SODIUM SUCCINATE 125 MG/2ML
60 INJECTION, POWDER, LYOPHILIZED, FOR SOLUTION INTRAMUSCULAR; INTRAVENOUS EVERY 6 HOURS
Status: DISCONTINUED | OUTPATIENT
Start: 2021-02-18 | End: 2021-02-22

## 2021-02-18 NOTE — PAYOR COMM NOTE
--------------  CONTINUED STAY REVIEW    Payor: JAD KLINE  Subscriber #:  [id number]  Authorization Number: [id number]    Admit date: 2/12/21  Admit time: 65    FAXING CLINICAL UPDATE FOR 2/18/21 2/18/21    Pulm / Critical Care Progress Note     S: p show increase in GGOs  - s/p bronch and LN FNA.  Awaiting results.  R/o opportunistic infection  - cont empiric bactrim for pjp coverage; pjp testing from bronch pending  2.  JEANETTE- on CPAP  3. proph- LMWH  4. Dispo- Full code  - will follow  - not ready for lidocaine-menthol 4-1 % 3 patch     Date Action Dose Route User    2/18/2021 1249 Patch Applied 3 patch Transdermal (Right Anterior Chest) Anabell Cline RN      methylPREDNISolone Sodium Succ (Solu-MEDROL) injection 125 mg     Date Action Dose Route

## 2021-02-18 NOTE — PROGRESS NOTES
[place] HOSPITALIST  Progress Note     Martha Brito Patient Status:  Observation    1965 MRN [id number]   [facility] 8NE-A Attending Stephen Cheng, 1604 [facility] Day # 6 PCP Erika Tijerina MD     Chief Complaint: Chest pain, SOB    S: of 1.1 mg/dL). Recent Labs   Lab 02/15/21  0445   PTP 13.6   INR 1.01     Recent Labs   Lab 02/14/21  1228   TROP <0.045        Imaging: Imaging data reviewed in Epic.     Medications:   • MethylPREDNISolone Sodium Succ  60 mg Intravenous Q6H   • lidocai

## 2021-02-18 NOTE — PROGRESS NOTES
Assumed care of patient at 0730. Alert and oriented. Vital signs stable. Sinus rhythm on telemetry. C/o pain across chest.  Managed with IV and topical pain medication. PO antibiotics administered as ordered.   Patient able to ambulate further in [facility]

## 2021-02-18 NOTE — PLAN OF CARE
Pt. Alert and o x 4 , on O2 at 2 li/min via nc , denies any SOB. Ambulate at the hallway on RA , aggie. well. Pt. With steady gait . NSR on the monitor. Cont. Monitor per tele/labs/v/s. Meds / pain meds as ordered.  Instructed to call staff when [facility] Respiratory Therapy support as indicated  - Manage/alleviate anxiety  - Monitor for signs/symptoms of CO2 retention  Outcome: Progressing     Problem: PAIN - ADULT  Goal: Verbalizes/displays adequate comfort level or patient's stated pain goal  Description

## 2021-02-18 NOTE — PROGRESS NOTES
[address] [facility] Patient Status:  Inpatient    1965 MRN [id number]   [facility] 8NE-A Attending Ursula Perkins, 1604 [facility] Day # 6 PCP Joyce Johnson MD     Pulm / Critical Care Progress Note     S: pt states murmur. Abdomen: soft, non-tender, non-distended, positive BS.    Extremity: no edema       Recent Labs   Lab 02/11/21  0912 02/15/21  0445 02/17/21  0532   WBC 11.0 16.2* 13.2*   HGB 15.6 14.2 14.1   HCT 45.6 42.9 41.6   .0 210.0 199.0     Recent

## 2021-02-19 PROCEDURE — 99232 SBSQ HOSP IP/OBS MODERATE 35: CPT | Performed by: INTERNAL MEDICINE

## 2021-02-19 NOTE — PLAN OF CARE
Problem: CARDIOVASCULAR - ADULT  Goal: Maintains optimal cardiac output and hemodynamic stability  Description: INTERVENTIONS:  - Monitor vital signs, rhythm, and trends  - Monitor for bleeding, hypotension and signs of decreased cardiac output  - Evalua effects  - Notify MD/LIP if interventions unsuccessful or patient reports new pain  - Anticipate increased pain with activity and pre-medicate as appropriate  Outcome: Not Progressing

## 2021-02-19 NOTE — PAYOR COMM NOTE
--------------  CONTINUED STAY REVIEW    Payor: BCSHAWNEE KLINE  Subscriber #:  [id number]  Authorization Number: [id number]    Admit date: 2/12/21  Admit time: 65    Admitting Physician: Tammy Westbrook MD  Attending Physician:  DO SELIN Cordon Chest) Perry Maldonado RN      methylPREDNISolone Sodium Succ (Solu-MEDROL) injection 60 mg     Date Action Dose Route User    2/19/2021 0958 Given 60 mg Intravenous Eric Forman RN    2/19/2021 0529 Given 60 mg Intravenous Brijesh Redd RN    2/18/ Continuous Infusing Medication:  PRN Medication:benzonatate, guaiFENesin, HYDROcodone-acetaminophen, acetaminophen, HYDROmorphone HCl **OR** HYDROmorphone HCl **OR** HYDROmorphone HCl, melatonin, PEG 3350, magnesium hydroxide, bisacodyl, Fleet Enema, ondan AST in the last 168 hours.     Invalid input(s): BILITOT, BILIDIR, PROT, LABALBU        Cx:  Ngtd; pjp dfa still pending        ASSESSMENT/PLAN:     1.  Atypical chest pain- could be sarcoidosis flair triggering pleurisy vs atypical infectious process  [facility]

## 2021-02-19 NOTE — PLAN OF CARE
Assumed care for pt at 19:30 on 2/18    A&Ox4, sad about long hospital stay and minimal improvement in symptom  Managing inhalation pain with 0.4mg dilaudid and 1 tab Norco 5 prn  VSS on RA while awake  NSR on tele  Pt applies and removes 2L NC at his [facility] Manage/alleviate anxiety  - Utilize distraction and/or relaxation techniques  - Monitor for opioid side effects  - Notify MD/LIP if interventions unsuccessful or patient reports new pain  - Anticipate increased pain with activity and pre-medicate as approp

## 2021-02-19 NOTE — PROGRESS NOTES
[address] [facility] Status:  [facility]    1965 MRN [id number]   [facility] 8NE-A Attending Luis Armando Caceres, 1604 [facility] Day # 7 PCP Noble Chavarria MD     Pulm / Critical Care Progress Note     S: pt states rhythm, normal S1S2, no murmur. Abdomen: soft, non-tender, non-distended, positive BS. Extremity: no edema       Recent Labs   Lab 02/15/21  0445 02/17/21  0532 02/19/21  0450   WBC 16.2* 13.2* 13.7*   HGB 14.2 14.1 16.5   HCT 42.9 41.6 47.9   .

## 2021-02-19 NOTE — PROGRESS NOTES
[facility]  Progress Note     Marlene Lee Patient Status:  Observation    1965 MRN [id number]   [facility] 8NE-A Attending Blake Valdivia, 1604 [facility] Day # 7 PCP Marisue Felty, MD     Chief Complaint: Chest pain, SOB    S: 02/14/21  1228   TROP <0.045        Imaging: Imaging data reviewed in Epic.     Medications:   • MethylPREDNISolone Sodium Succ  60 mg Intravenous Q6H   • lidocaine-menthol  3 patch Transdermal Daily   • Sulfamethoxazole-TMP DS  2 tablet Oral TID   • fenofi patient, pt's , Danii Landry RN.     Jameson Purcell, DO

## 2021-02-19 NOTE — PROGRESS NOTES
02/19/21 1330   Mobility   O2 walk?  Yes   SPO2% on Room Air at Rest 93   SPO2% on Oxygen at Rest 95   At rest oxygen flow (liters per minute) 2   SPO2% Ambulation on Room Air 93     O2 walk completed, patient did not desaturate or require O2 while ambul

## 2021-02-20 PROCEDURE — 99232 SBSQ HOSP IP/OBS MODERATE 35: CPT | Performed by: INTERNAL MEDICINE

## 2021-02-20 NOTE — PROGRESS NOTES
[facility]  Progress Note     Dwain Saurabhmiracle Patient Status:  Observation    1965 MRN [id number]   [facility] 8NE-A Attending Elijah Claire, 1604 [facility] Day # 8 PCP Sha Ochoa MD     Chief Complaint: Chest pain, SOB    S: (H)).    Recent Labs   Lab 02/15/21  0445   PTP 13.6   INR 1.01     Recent Labs   Lab 02/14/21  1228   TROP <0.045        Imaging: Imaging data reviewed in Epic.     Medications:   • MethylPREDNISolone Sodium Succ  60 mg Intravenous Q6H   • lidocaine-mentho care discussed with patient, RN.     Stevie Juarez, DO

## 2021-02-20 NOTE — PLAN OF CARE
Assumed care of pt @2330. Pt axox4, denies pain at this time. Sinus rhythm on tele. Lungs clear. O2 sat on RA 95%. Abdomen soft, bs +4. Pt voiding without difficulty.    Plan  -continue to monitor pt on tele  -IV solumedrol Q6 hours  -assess for pain and me broncho-pulmonary hygiene including cough, deep breathe, Incentive Spirometry  - Assess the need for suctioning and perform as needed  - Assess and instruct to report SOB or any respiratory difficulty  - Respiratory Therapy support as indicated  - Manage/a

## 2021-02-20 NOTE — PROGRESS NOTES
[facility] Pulmonary, Critical Care and Sleep    Dwain Dexter Patient Status:  Inpatient    1965 MRN [id number]   [facility] 8NE-A Attending Marietta Hernandes, DO   Hosp Day # 8 PCP Sha Ochoa MD       Date of Admission: 2/10/202 Latest Ref Rng & Units 2/16/2021   NEUTROPHILS BRONCHIAL WASHING      % 66   LYMPHOCYTE BRONCHIAL WASHING      % 14   MON/MACROPHAGE BRONCHIAL WASH      % 19   EOSINOPHIL BRONCHIAL WASHING      % 1   BASOPHIL BRONCHIAL WASHING      % 0   TOTAL CELLS COUN for input(s): PCT in the last 168 hours. Cardiac  Recent Labs   Lab 02/14/21  1228   TROP <0.045       Creatinine Kinase  No results for input(s): CK in the last 168 hours.     Inflammatory Markers  No results for input(s): CRP, THEODORA, LDH, DDIMER in the l

## 2021-02-20 NOTE — PLAN OF CARE
Aox4, 92% room air, SOB w/ exertion, dilaudid given for chest pain, VSS, IV solumedrol, NSR on tele, Mirilax for constipation, small bowel movement this am, safety precautions in place. Will continue to monitor the pt.      Problem: CARDIOVASCULAR - ADULT signs/symptoms of CO2 retention  Outcome: Progressing     Problem: PAIN - ADULT  Goal: Verbalizes/displays adequate comfort level or patient's stated pain goal  Description: INTERVENTIONS:  - Encourage pt to monitor pain and request assistance  - Assess pa

## 2021-02-21 PROCEDURE — 99232 SBSQ HOSP IP/OBS MODERATE 35: CPT | Performed by: INTERNAL MEDICINE

## 2021-02-21 NOTE — PROGRESS NOTES
[facility]  Progress Note     Kimmy Pack Patient Status:  Observation    1965 MRN [id number]   [facility] 8NE-A Attending Gillian Lee, 1604 [facility] Day # 9 PCP Daquan Hernandes MD     Chief Complaint: Chest pain, SOB    S: CK in the last 168 hours. Imaging: Imaging data reviewed in Epic.     Medications:   • MethylPREDNISolone Sodium Succ  60 mg Intravenous Q6H   • lidocaine-menthol  3 patch Transdermal Daily   • fenofibrate micronized  201 mg Oral QAM AC   • Fluticasone

## 2021-02-21 NOTE — PROGRESS NOTES
[facility] Pulmonary, Critical Care and Sleep    Sonido Kole Patient Status:  Inpatient    1965 MRN [id number]   [facility] 8NE-A Attending Mark Stone DO   Hosp Day # 9 PCP Magy Hollingsworth MD       Date of Admission: 2/10/202 Latest Ref Rng & Units 2/16/2021   NEUTROPHILS BRONCHIAL WASHING      % 66   LYMPHOCYTE BRONCHIAL WASHING      % 14   MON/MACROPHAGE BRONCHIAL WASH      % 19   EOSINOPHIL BRONCHIAL WASHING      % 1   BASOPHIL BRONCHIAL WASHING      % 0   TOTAL CELLS COUNTE Lab 02/20/21  0519   PCT <0.05       Cardiac  No results for input(s): TROP, PBNP in the last 168 hours. Creatinine Kinase  No results for input(s): CK in the last 168 hours.     Inflammatory Markers  No results for input(s): CRP, THEODORA, LDH, DDIMER in t

## 2021-02-21 NOTE — PLAN OF CARE
Assumed care of pt at 0730  A&Ox4, up ad marine with a steady gait, continues to endorse 6/10 pain related to sarcoidosis, pain relieved by 0.4mg Dilaudid as ordered  R/A, NSR, no cardiac related chest pain, no shortness of breath, pt endorses difficulty taki including cough, deep breathe, Incentive Spirometry  - Assess the need for suctioning and perform as needed  - Assess and instruct to report SOB or any respiratory difficulty  - Respiratory Therapy support as indicated  - Manage/alleviate anxiety  - Monito

## 2021-02-21 NOTE — PLAN OF CARE
Pt is A&Ox4. RA, c/o mild BARKLEY. Pain 6/10 - relief with dilaudid 0.4mg. NSR on tele, denies cardiovascular symptoms. Continent B&B. Up ad marine. PIV flushes well - refused dressing change. IV steroids overnight. POC updated with pt, will monitor.     Proble anxiety  - Monitor for signs/symptoms of CO2 retention  Outcome: Progressing     Problem: PAIN - ADULT  Goal: Verbalizes/displays adequate comfort level or patient's stated pain goal  Description: INTERVENTIONS:  - Encourage pt to monitor pain and request

## 2021-02-22 PROCEDURE — 99232 SBSQ HOSP IP/OBS MODERATE 35: CPT | Performed by: INTERNAL MEDICINE

## 2021-02-22 RX ORDER — METHYLPREDNISOLONE SODIUM SUCCINATE 125 MG/2ML
60 INJECTION, POWDER, LYOPHILIZED, FOR SOLUTION INTRAMUSCULAR; INTRAVENOUS EVERY 12 HOURS
Status: DISCONTINUED | OUTPATIENT
Start: 2021-02-22 | End: 2021-02-22

## 2021-02-22 RX ORDER — METHYLPREDNISOLONE SODIUM SUCCINATE 125 MG/2ML
60 INJECTION, POWDER, LYOPHILIZED, FOR SOLUTION INTRAMUSCULAR; INTRAVENOUS EVERY 6 HOURS
Status: DISCONTINUED | OUTPATIENT
Start: 2021-02-22 | End: 2021-02-24

## 2021-02-22 RX ORDER — SENNA AND DOCUSATE SODIUM 50; 8.6 MG/1; MG/1
2 TABLET, FILM COATED ORAL
Status: DISCONTINUED | OUTPATIENT
Start: 2021-02-22 | End: 2021-04-01

## 2021-02-22 RX ORDER — MYCOPHENOLATE MOFETIL 250 MG/1
500 CAPSULE ORAL
Status: DISCONTINUED | OUTPATIENT
Start: 2021-02-22 | End: 2021-02-28

## 2021-02-22 NOTE — PAYOR COMM NOTE
--------------  CONTINUED STAY REVIEW    Payor: JAD PPO  Subscriber #:  [id number]  Authorization Number: [id number]    Admit date: 2/12/21  Admit time: 65    Admitting Physician: Pennie Martines MD  Attending Physician:  DO SELIN Mcqueen Intravenous Sabas Jasso, RN    2/22/2021 0400 Given 60 mg Intravenous Mariama Flores RN    2/21/2021 2006 Given 60 mg Intravenous Venora BRENDA Flores    2/21/2021 1459 Given 60 mg Intravenous Sabas Jasso, RN      Pantoprazole Sodium (PROTONIX) EC tab 40 mg OBJECTIVE:  /72 (BP Location: Left arm)   Pulse 88   Temp 98 °F (36.7 °C) (Oral)   Resp 20   Ht 5' 8\" (1.727 m)   Wt 204 lb 6.4 oz (92.7 kg)   SpO2 98%   BMI 31.08 kg/m²    Temp (24hrs), Av.1 °F (36.7 °C), Min:97.7 °F (36.5 °C), Max:98.3 °F (36. malignancy.            Recent Labs   Lab 02/17/21  0532 02/19/21  0450 02/20/21  0519   WBC 13.2* 13.7* 17.5*   HGB 14.1 16.5 17.9*   HCT 41.6 47.9 51.0   .0 220.0 260.0            Recent Labs   Lab 02/17/21  0532 02/19/21  0450 02/20/21  0519   GLU negative. Leukocytosis. Also could consider inflammatory issue, however, should have typically responded to steroids. 2. JEANETTE- on CPAP  3.  Acute renal insufficiency: ? From bactrim  - would hold bactrim while we await pjp dfa.   4. proph- LMWH  5. Dis    OBJECTIVE:  /90 (BP Location: Left arm)   Pulse 100   Temp 97.7 °F (36.5 °C) (Oral)   Resp 18   Ht 5' 8\" (1.727 m)   Wt 204 lb 6.4 oz (92.7 kg)   SpO2 95%   BMI 31.08 kg/m²    Temp (24hrs), Av.8 °F (36.6 °C), Min:97.4 °F (36.3 °C), Max:98.3 ° malignancy.            Recent Labs   Lab 02/17/21  0532 02/19/21  0450 02/20/21  0519   WBC 13.2* 13.7* 17.5*   HGB 14.1 16.5 17.9*   HCT 41.6 47.9 51.0   .0 220.0 260.0            Recent Labs   Lab 02/19/21  0450 02/20/21  0519 02/21/21  0753   GLU await further bronch results but would like a better assessment of what his ambulatory status is. PCT negative again 2/20. Leukocytosis monitoring. Also could consider inflammatory issue, however, should have typically responded to steroids.    Flow c

## 2021-02-22 NOTE — PLAN OF CARE
Assumed care of pt at 0730  A&Ox4, up ad marine with a steady gait, complaining of 6-8/10 pain requiring PO norco and 0.4mg IV dilaudid as ordered  R/A, NSR, no cardiac-related chest pain, no shortness of breath at rest although pt endorses difficulty taking including cough, deep breathe, Incentive Spirometry  - Assess the need for suctioning and perform as needed  - Assess and instruct to report SOB or any respiratory difficulty  - Respiratory Therapy support as indicated  - Manage/alleviate anxiety  - Monito

## 2021-02-22 NOTE — PAYOR COMM NOTE
--------------  CONTINUED STAY REVIEW    Payor: [facility] PPO  Subscriber #:  [id number]  Authorization Number: [id number]    Admit date: 2/12/21  Admit time: 65    Admitting Physician: Kristeen Hashimoto, MD  Attending Physician:  DO SELIN Dela Cruz Action Dose Route User    2/22/2021 0228 Given 60 mg Intravenous Dread Murrell RN    2/22/2021 0400 Given 60 mg Intravenous Surya Garibay RN    2/21/2021 2006 Given 60 mg Intravenous Surya Garibay RN      methylPREDNISolone Sodium Succ (Solu-MEDROL) inje perfusion - ex.  Angina  - Evaluate fluid balance, assess for edema, trend weights  Outcome: Progressing  Goal: Absence of cardiac arrhythmias or at baseline  Description: INTERVENTIONS:  - Continuous cardiac monitoring, monitor vital signs, obtain 12 lead with activity and pre-medicate as appropriate  Outcome: Progressing               Electronically signed by Christine Schwartz RN at 2/22/2021 11:17 AM     ED to Hosp-Admission (Current) on 2/10/2021        Detailed Report        Note shared with patient  Chart Re

## 2021-02-22 NOTE — PLAN OF CARE
Pt is A&Ox4. RA, c/o mild BARKLEY. Pain 8/10 - relief with dilaudid 0.4mg and Norco given also for sleep. NSR on tele, denies cardiovascular symptoms. Continent B&B. Up ad marine. PIV flushes well. IV steroids overnight. POC updated with pt, will monitor. Manage/alleviate anxiety  - Monitor for signs/symptoms of CO2 retention  Outcome: Progressing     Problem: PAIN - ADULT  Goal: Verbalizes/displays adequate comfort level or patient's stated pain goal  Description: INTERVENTIONS:  - Encourage pt to monitor

## 2021-02-22 NOTE — PROGRESS NOTES
[facility]  Progress Note     Sai Hayes Patient Status:  Observation    1965 MRN [id number]   [facility] 8NE-A Attending Rina Cerda, 1604 [facility] Day # 10 PCP Lamar Mehta MD     Chief Complaint: Chest pain, SOB    S mg/dL). No results for input(s): PTP, INR in the last 168 hours. No results for input(s): TROP, CK in the last 168 hours. Imaging: Imaging data reviewed in Epic.     Medications:   • MethylPREDNISolone Sodium Succ  60 mg Intravenous Q6H   • lidocain function, pulm recs  At this point Mr. Salma Bardley is expected to be discharge to: Home    Plan of care discussed with patient, RN.     Braden Bird, DO

## 2021-02-23 ENCOUNTER — APPOINTMENT (OUTPATIENT)
Dept: CV DIAGNOSTICS | Facility: HOSPITAL | Age: 56
DRG: 981 | End: 2021-02-23
Attending: INTERNAL MEDICINE
Payer: COMMERCIAL

## 2021-02-23 PROCEDURE — 99232 SBSQ HOSP IP/OBS MODERATE 35: CPT | Performed by: INTERNAL MEDICINE

## 2021-02-23 PROCEDURE — 93306 TTE W/DOPPLER COMPLETE: CPT | Performed by: INTERNAL MEDICINE

## 2021-02-23 RX ORDER — HYDROCODONE BITARTRATE AND ACETAMINOPHEN 5; 325 MG/1; MG/1
1-2 TABLET ORAL EVERY 6 HOURS PRN
Status: DISCONTINUED | OUTPATIENT
Start: 2021-02-23 | End: 2021-03-03

## 2021-02-23 RX ORDER — NALOXONE HYDROCHLORIDE 0.4 MG/ML
0.08 INJECTION, SOLUTION INTRAMUSCULAR; INTRAVENOUS; SUBCUTANEOUS
Status: DISCONTINUED | OUTPATIENT
Start: 2021-02-23 | End: 2021-02-28

## 2021-02-23 RX ORDER — ONDANSETRON 2 MG/ML
4 INJECTION INTRAMUSCULAR; INTRAVENOUS EVERY 6 HOURS PRN
Status: DISCONTINUED | OUTPATIENT
Start: 2021-02-23 | End: 2021-02-28

## 2021-02-23 RX ORDER — HYDROMORPHONE HYDROCHLORIDE 1 MG/ML
0.4 INJECTION, SOLUTION INTRAMUSCULAR; INTRAVENOUS; SUBCUTANEOUS EVERY 2 HOUR PRN
Status: DISCONTINUED | OUTPATIENT
Start: 2021-02-23 | End: 2021-03-03

## 2021-02-23 RX ORDER — DIPHENHYDRAMINE HYDROCHLORIDE 50 MG/ML
12.5 INJECTION INTRAMUSCULAR; INTRAVENOUS EVERY 4 HOURS PRN
Status: DISCONTINUED | OUTPATIENT
Start: 2021-02-23 | End: 2021-02-28

## 2021-02-23 RX ORDER — HYDROMORPHONE HYDROCHLORIDE 1 MG/ML
1.2 INJECTION, SOLUTION INTRAMUSCULAR; INTRAVENOUS; SUBCUTANEOUS EVERY 2 HOUR PRN
Status: DISCONTINUED | OUTPATIENT
Start: 2021-02-23 | End: 2021-03-03

## 2021-02-23 RX ORDER — HYDROMORPHONE HYDROCHLORIDE 1 MG/ML
0.8 INJECTION, SOLUTION INTRAMUSCULAR; INTRAVENOUS; SUBCUTANEOUS EVERY 2 HOUR PRN
Status: DISCONTINUED | OUTPATIENT
Start: 2021-02-23 | End: 2021-03-03

## 2021-02-23 RX ORDER — TRAMADOL HYDROCHLORIDE 50 MG/1
50 TABLET ORAL EVERY 6 HOURS PRN
Status: DISCONTINUED | OUTPATIENT
Start: 2021-02-23 | End: 2021-03-03

## 2021-02-23 NOTE — PROGRESS NOTES
Pulmonary Progress Note        NAME: Denise Sena - ROOM: 1020/5224-B - MRN: [id number] - Age: 54year old - : 1965        Last 24hrs: No events overnight, feels that the pain is worse today, understandably frustrated    OBJECTIVE:   21 culture with normal ricardo  - CTA chest did not show PE but did show increase in GGOs  - s/p bronch and LN FNA.  Awaiting final AFB and fungal cultures  -was on empiric bactrim for pjp coverage; pjp testing from bronch- negative  PCT negative again 2/20.

## 2021-02-23 NOTE — PLAN OF CARE
Assumed care of pt at 0730  A&Ox4, up ad marine with a steady gait, endorses 9/10 pain today unresolved by current norco/dilaudid orders, Dr Saintclair Fleeting aware, new orders placed  R/A, NSR, no cardiac-related chest pain, no shortness of breath at rest although pt has broncho-pulmonary hygiene including cough, deep breathe, Incentive Spirometry  - Assess the need for suctioning and perform as needed  - Assess and instruct to report SOB or any respiratory difficulty  - Respiratory Therapy support as indicated  - Manage/a

## 2021-02-23 NOTE — PROGRESS NOTES
[facility]  Progress Note     Niraj Bolanos Patient Status:  Observation    1965 MRN [id number]   [facility] 8NE-A Attending Leela Springer, 1604 [facility] Day # 11 PCP Joyce Johnson MD     Chief Complaint: Chest pain, SOB    S GFRNAA 55* 63 72   CA 8.4* 9.0 9.0   * 133* 133*   K 4.7 4.9 4.9   CL 97* 98 97*   CO2 28.0 29.0 31.0     Estimated Creatinine Clearance: 70.8 mL/min (based on SCr of 1.14 mg/dL). No results for input(s): PTP, INR in the last 168 hours.   Recent bactrim  5. Hypertension  1. HCTZ  6. Hyperlipidemia  1. Statin and fenofibrate  7. GERD  1. PPI  8. JEANETTE on CPAP  9.  Leukocytosis - improving, likely steroid induced    Quality:  · DVT Prophylaxis: Lovenox  · CODE status: Full  · [place]: No  · Central line:

## 2021-02-23 NOTE — DIETARY NOTE
[address]     Admitting diagnosis:  Sarcoidosis [D86.9]  Hypokalemia [E87.6]  Chest pain of uncertain etiology [B51.4]    Ht: 172.7 cm (5' 8\")  Wt: 92.7 kg (204 lb 6.4 oz).    BMI: Body mass index is 31.08 kg/

## 2021-02-23 NOTE — PROGRESS NOTES
[facility] Pharmacy Note:  Pain Consult    Isaac Stuart is a 54year old patient started on Dilaudid PCA by Dr. Leif Hester.    Pharmacy was consulted to review medication profile and to discontinue previously ordered narcotics and sedatives.     Medication profile w

## 2021-02-23 NOTE — PAYOR COMM NOTE
--------------  CONTINUED STAY REVIEW    Payor: JAD KLINE  Subscriber #:  [id number]  Authorization Number: [id number]    Admit date: 2/12/21  Admit time: [place]ar FOR 2/23/21 2/23/21   Pulmonary Progress Note       Last 24hrs: No uptitrate to 1000 BID  -will hold plaquenil  -Could consider open lung biopsy if symptoms/CT findings persist despite above  -given worsening pain today and borderline tachycardia will check d-dimer, if elevated will repeat CTA  2. JEANETTE- on CPAP  3.  Acute r MG per tab 1-2 tablet     Date Action Dose Route User    2/23/2021 1239 Given 1 tablet Oral Kymberly Van RN      HYDROmorphone HCl (DILAUDID) 1 MG/ML injection 0.4 mg     Date Action Dose Route User    2/23/2021 0553 Given 0.4 mg Intravenous Elsie Walls 8.6-50 MG tab 2 tablet     Date Action Dose Route User    2/23/2021 0600 Given 2 tablet Oral Delmi Cardoza RN      fenofibrate micronized (LOFIBRA) cap 201 mg     Date Action Dose Route User    2/23/2021 0557 Given 201 mg Oral Marylou Cardoza

## 2021-02-23 NOTE — PLAN OF CARE
Alert. Oriented. Sr per tele. Hr 70s. Medicated w/ dilaudid for c/o rib cage pain. Ambulated in halls last night. On iv steroid. Poc discussed with pt. Cont. to monitor pt.

## 2021-02-24 PROCEDURE — 99232 SBSQ HOSP IP/OBS MODERATE 35: CPT | Performed by: INTERNAL MEDICINE

## 2021-02-24 RX ORDER — METHYLPREDNISOLONE SODIUM SUCCINATE 125 MG/2ML
60 INJECTION, POWDER, LYOPHILIZED, FOR SOLUTION INTRAMUSCULAR; INTRAVENOUS EVERY 8 HOURS
Status: DISCONTINUED | OUTPATIENT
Start: 2021-02-25 | End: 2021-02-25

## 2021-02-24 NOTE — PROGRESS NOTES
[address] [facility] Patient Status:  In[facility]    1965 MRN [id number]   [facility] 8NE-A Attending Fabby Villafuerte, 1604 [facility] Day # 12 PCP Duke Bill MD     SUBJECTIVE: still c/o pain.   Doesn't feel that dilaudid do guaiFENesin (ROBITUSSIN) 100mg/5ml LIQUID 100 mg, 100 mg, Oral, Q4H PRN  •  fenofibrate micronized (LOFIBRA) cap 201 mg, 201 mg, Oral, QAM AC  •  Fluticasone Furoate-Vilanterol (BREO ELLIPTA) 200-25 MCG/INH inhaler 1 puff, 1 puff, Inhalation, Daily  •  hyd ASSESSMENT/PLAN:  1.  Atypical chest pain- could be sarcoidosis flair triggering pleurisy vs atypical infectious process  - CRP mildly elevated -sputum culture with normal ricardo  - CTA chest did not show PE but did show increase in GGOs  - s/p bronch and

## 2021-02-24 NOTE — PROGRESS NOTES
[facility]  Progress Note     Maurie Dance Patient Status:  Observation    1965 MRN [id number]   [facility] 8NE-A Attending Kiersten Venegas DO   Hosp Day # 12 PCP Lemuel Burks MD     Chief Complaint: Chest pain, SOB    S 29.0 31.0 31.0     Estimated Creatinine Clearance: 75.5 mL/min (based on SCr of 1.07 mg/dL). No results for input(s): PTP, INR in the last 168 hours. Recent Labs   Lab 02/23/21  1520   TROP <0.045        Imaging: Imaging data reviewed in Epic.     Medic with exertion  1. Home O2 assessment negative for desaturation  4. Mild SALLY - improved with discontinuing bactrim  5. Hypertension  1. HCTZ  6. Hyperlipidemia  1. Statin and fenofibrate  7. GERD  1. PPI  8. JEANETTE on CPAP  9.  Leukocytosis - likely steroid ind

## 2021-02-24 NOTE — PAYOR COMM NOTE
--------------  CONTINUED STAY REVIEW    Payor: JAD KLINE  Subscriber #:  [id number]  Authorization Number: [id number]    Admit date: 2/12/21  Admit time: [place] FOR 2/24/21 2/24/21   Chief Complaint: Chest pain, SOB     S: No a better pain control, will likely stop in the next 1-2 days  3. Pulm following  4. Bactrim discontinued - PJP negative from bronch  5. Repeat procalcitonin negative, Sputum cx negative > continue to monitor off abx  6. S/p bronch, BAL and EBUS 2/16.  Negativ 1442 Given 1.2 mg Intravenous Venia BRENDA Arevalo    2/23/2021 1240 Given 1.2 mg Intravenous Jesica Arevalo RN      magnesium hydroxide (MILK OF MAGNESIA) 400 MG/5ML suspension 30 mL     Date Action Dose Route User    2/24/2021 0538 Given 30 mL Oral Traci Beer

## 2021-02-24 NOTE — PLAN OF CARE
Alert. Oriented. Sr per tele. Hr 70s. Still w/ chest/rib cage pain. On pca/dilaudid w/ only partial relief of pain. No nausea noted. Ambulated in halls last night. Tolerated well. Still w/ on/off sweating at noc. Poc updated w/ pt. Voiced understanding.  Co

## 2021-02-25 PROCEDURE — 99232 SBSQ HOSP IP/OBS MODERATE 35: CPT | Performed by: INTERNAL MEDICINE

## 2021-02-25 NOTE — PAYOR COMM NOTE
--------------  CONTINUED STAY REVIEW    Payor: JAD KLINE  Subscriber #:  [id number]  Authorization Number: [id number]    Admit date: 2/12/21  Admit time: [id number]    FAXING CLINICAL UPDATE FOR 2/25/21 2/25/21  Chief Complaint: Chest pain, SOB     S: Jimmie Villafuerte pump for better pain control, will likely stop soon, discussed with patient  3. Pulm following  4. Bactrim discontinued - PJP negative from bronch  5. Repeat procalcitonin negative, Sputum cx negative > continue to monitor off abx  6.  S/p bronch, BAL and E Delmi QUINTEROS RN      Fluticasone Furoate-Vilanterol (BREO ELLIPTA) 200-25 MCG/INH inhaler 1 puff     Date Action Dose Route User    2/25/2021 1029 Given 1 puff Inhalation Lisa Taylor, BRENDA      hydrochlorothiazide (HYDRODIURIL) tab 25 mg     Date Action Fan Epps 2/25/2021 0509 Given 201 mg Oral Bruce Cardoza, RN

## 2021-02-25 NOTE — PLAN OF CARE
Received patient at 0730. Alert and orientated x4. Tele Rhythm NSR. O2 saturation 92% on RA. Breath sounds clear. No C/O dizziness or shortness of breath. Pt voiding with no issues. Pt ambulated in hallway w/o issues. Skin is dry and intact. Dilaudid PCA. distraction and/or relaxation techniques  - Monitor for opioid side effects  - Notify MD/LIP if interventions unsuccessful or patient reports new pain  - Anticipate increased pain with activity and pre-medicate as appropriate  Outcome: Progressing

## 2021-02-25 NOTE — PLAN OF CARE
Patient aox3, falling asleep but states sarcoid pain is 6/10 and doesn't get better being on dilaudid pca. Solumedrol IVPB given and continue solumedrol 60 mg IVP q 8 hours. NSR. HR 80. Meds given.    Problem: CARDIOVASCULAR - ADULT  Goal: Maintains optim

## 2021-02-25 NOTE — PROGRESS NOTES
[facility]  Progress Note     Ashleybridget Wileymiracle Patient Status:  Observation    1965 MRN [id number]   [facility] 8NE-A Attending Elijah Claire, DO   Hosp Day # 15 PCP Sha Ochoa MD     Chief Complaint: Chest pain, SOB    S 78   CA 9.0 9.0 8.5   * 133* 131*   K 4.9 4.9 4.5   CL 98 97* 97*   CO2 29.0 31.0 31.0     Estimated Creatinine Clearance: 75.5 mL/min (based on SCr of 1.07 mg/dL). No results for input(s): PTP, INR in the last 168 hours.   Recent Labs   Lab 02/23/ diet  1. Liberalized diet and encouraged salt intake  3. Hypoxia with exertion  1. Home O2 assessment negative for desaturation  4. Mild SALLY - improved with discontinuing bactrim  5. Hypertension  1. HCTZ  6. Hyperlipidemia  1. Statin and fenofibrate  7.  G

## 2021-02-25 NOTE — PROGRESS NOTES
Pulmonary Progress Note        NAME: Beverly Ear - ROOM: 0930/0793-Q - MRN: [id number] - Age: 54year old - : 1965        Last 24hrs: No events overnight, feels marginally better after his solumedrol challenge yesterday    OBJECTIVE:    atypical infectious process  - CRP mildly elevated -sputum culture with normal ricardo  - CTA chest did not show PE but did show increase in GGOs  - s/p bronch and LN FNA.  Awaiting final AFB and fungal cultures- so far NGTD  -was on empiric bactrim for pjp

## 2021-02-25 NOTE — PLAN OF CARE
Alert. Maggie Guillaume. Sr per tele. Hr 90s. Received mom w/ prune juice 2x, senokot ,miralax & dulcolax supp w/ no result. Bs present. Pt getting anxious re constipation. Will offer fleets enema this morning. Partial pain relief w/ dilaudid pca. Pt drowsy.  Poc u

## 2021-02-26 ENCOUNTER — APPOINTMENT (OUTPATIENT)
Dept: CT IMAGING | Facility: HOSPITAL | Age: 56
DRG: 981 | End: 2021-02-26
Attending: INTERNAL MEDICINE
Payer: COMMERCIAL

## 2021-02-26 PROCEDURE — 71275 CT ANGIOGRAPHY CHEST: CPT | Performed by: INTERNAL MEDICINE

## 2021-02-26 PROCEDURE — 99232 SBSQ HOSP IP/OBS MODERATE 35: CPT | Performed by: INTERNAL MEDICINE

## 2021-02-26 RX ORDER — MAGNESIUM HYDROXIDE/ALUMINUM HYDROXICE/SIMETHICONE 120; 1200; 1200 MG/30ML; MG/30ML; MG/30ML
30 SUSPENSION ORAL 4 TIMES DAILY PRN
Status: DISCONTINUED | OUTPATIENT
Start: 2021-02-26 | End: 2021-04-01

## 2021-02-26 RX ORDER — AMLODIPINE BESYLATE 5 MG/1
10 TABLET ORAL DAILY
Status: DISCONTINUED | OUTPATIENT
Start: 2021-02-26 | End: 2021-03-12

## 2021-02-26 RX ORDER — METHYLPREDNISOLONE SODIUM SUCCINATE 125 MG/2ML
125 INJECTION, POWDER, LYOPHILIZED, FOR SOLUTION INTRAMUSCULAR; INTRAVENOUS EVERY 6 HOURS
Status: DISCONTINUED | OUTPATIENT
Start: 2021-02-26 | End: 2021-03-03

## 2021-02-26 RX ORDER — KETOROLAC TROMETHAMINE 30 MG/ML
30 INJECTION, SOLUTION INTRAMUSCULAR; INTRAVENOUS EVERY 6 HOURS PRN
Status: DISPENSED | OUTPATIENT
Start: 2021-02-26 | End: 2021-02-28

## 2021-02-26 RX ORDER — CALCIUM CARBONATE 200(500)MG
500 TABLET,CHEWABLE ORAL
Status: DISCONTINUED | OUTPATIENT
Start: 2021-02-26 | End: 2021-04-01

## 2021-02-26 NOTE — PROGRESS NOTES
[facility] H[facility]ITAL[facility]  Progress Note     Tricia Farley Patient Status:  Observation    1965 MRN [id number]   [facility] 8NE-A Attending Laila Streeterr, DO   Hosp Day # 15 PCP Dolores Pickett MD     Chief Complaint: Chest pain, SOB    S 02/24/21  0439 02/26/21  0514   * 129* 116*   BUN 36* 26* 32*   CREATSERUM 1.14 1.07 0.94   GFRAA 83 90 105   GFRNAA 72 78 91   CA 9.0 8.5 8.6   ALB  --   --  3.2*   * 131* 130*   K 4.9 4.5 3.8   CL 97* 97* 92*   CO2 31.0 31.0 31.0   ALKPHO  - mg BID slowly  9. Allergic to prednisone, was on solumedrol for over 2 weeks without significant improvement. Pulm pulsed patient with 1g solumedrol 2/24 with slight improvement.  Was tapered to decadron 10mg BID 2/25 but had worsening pain and now back on

## 2021-02-26 NOTE — PLAN OF CARE
Assumed care of Pt. At [address]. Pt. Is Axox4 and on room air with an O2 sat of 94%. Pt. Has clear bilateral breath sounds. Pt. Denies any shortness of breath. Pt. Has NSR w/ a HR of 92. Pt. Denies any dizziness. Pt. Has a PCA pump running (see MAR).  Pt. José Reynoso weights  Outcome: Progressing  Goal: Absence of cardiac arrhythmias or at baseline  Description: INTERVENTIONS:  - Continuous cardiac monitoring, monitor vital signs, obtain 12 lead EKG if indicated  - Evaluate effectiveness of antiarrhythmic and heart rat rhythm and repeat lab results as appropriate  - Fluid restriction as ordered  - Instruct patient on fluid and nutrition restrictions as appropriate  Outcome: Progressing  Goal: Hemodynamic stability and optimal renal function maintained  Description: INTER INTERVENTIONS:  - Encourage pt to monitor pain and request assistance  - Assess pain using appropriate pain scale  - Administer analgesics based on type and severity of pain and evaluate response  - Implement non-pharmacological measures as appropriate and coordinating discharge planning if the patient needs post-hospital services based on physician/LIP order or complex needs related to functional status, cognitive ability or social support system  Outcome: Progressing

## 2021-02-26 NOTE — PLAN OF CARE
Received patient at 0730. Alert and orientated x4. Tele Rhythm NSR. O2 saturation 91% on RA. Breath sounds clear. No C/O dizziness or shortness of breath. Pt voiding with no issues. Skin is dry and intact. Dilaudid PCA. IV solumedrol.  Pt c/o severe rib/ch Cessation handout, if applicable  - Encourage broncho-pulmonary hygiene including cough, deep breathe, Incentive Spirometry  - Assess the need for suctioning and perform as needed  - Assess and instruct to report SOB or any respiratory difficulty  - Respir ADULT  Goal: Skin integrity remains intact  Description: INTERVENTIONS  - Assess and document risk factors for pressure ulcer development  - Assess and document skin integrity  - Monitor for areas of redness and/or skin breakdown  - Initiate interventions, deficits and behaviors that affect risk of falls.   - [place] fall precautions as indicated by assessment.  - Educate pt/family on patient safety including physical limitations  - Instruct pt to call for assistance with activity based on assessment  - Mod

## 2021-02-26 NOTE — PROGRESS NOTES
Pulmonary Progress Note        NAME: Jim Gallegos - ROOM: 2930/7208-P - MRN: [id number] - Age: 54year old - : 1965        Last 24hrs: Overnight had worsening chest pain/heartburn, no relief w/ maalox, d-dimer checked and was elevated, went for -sputum culture with normal ricardo  - CTA chest did not show PE but did show increase in GGOs  - s/p bronch and LN FNA.  Awaiting final AFB and fungal cultures- so far NGTD  -was on empiric bactrim for pjp coverage; pjp testing from bronch- negative  - PCT

## 2021-02-27 PROCEDURE — 99232 SBSQ HOSP IP/OBS MODERATE 35: CPT | Performed by: INTERNAL MEDICINE

## 2021-02-27 RX ORDER — PROCHLORPERAZINE EDISYLATE 5 MG/ML
5 INJECTION INTRAMUSCULAR; INTRAVENOUS ONCE
Status: DISCONTINUED | OUTPATIENT
Start: 2021-02-27 | End: 2021-02-27

## 2021-02-27 RX ORDER — LORAZEPAM 0.5 MG/1
0.5 TABLET ORAL ONCE
Status: DISCONTINUED | OUTPATIENT
Start: 2021-02-27 | End: 2021-02-27

## 2021-02-27 RX ORDER — COLCHICINE 0.6 MG/1
0.6 TABLET ORAL 2 TIMES DAILY
Status: DISCONTINUED | OUTPATIENT
Start: 2021-02-27 | End: 2021-03-01

## 2021-02-27 RX ORDER — LORAZEPAM 2 MG/ML
0.5 INJECTION INTRAMUSCULAR ONCE AS NEEDED
Status: COMPLETED | OUTPATIENT
Start: 2021-02-27 | End: 2021-02-27

## 2021-02-27 RX ORDER — LISINOPRIL 10 MG/1
10 TABLET ORAL DAILY
Status: DISCONTINUED | OUTPATIENT
Start: 2021-02-28 | End: 2021-03-23

## 2021-02-27 RX ORDER — METOCLOPRAMIDE HYDROCHLORIDE 5 MG/ML
10 INJECTION INTRAMUSCULAR; INTRAVENOUS EVERY 6 HOURS PRN
Status: DISCONTINUED | OUTPATIENT
Start: 2021-02-27 | End: 2021-04-01

## 2021-02-27 RX ORDER — PROCHLORPERAZINE EDISYLATE 5 MG/ML
5 INJECTION INTRAMUSCULAR; INTRAVENOUS ONCE AS NEEDED
Status: COMPLETED | OUTPATIENT
Start: 2021-02-27 | End: 2021-02-27

## 2021-02-27 RX ORDER — LISINOPRIL 5 MG/1
5 TABLET ORAL DAILY
Status: DISCONTINUED | OUTPATIENT
Start: 2021-02-27 | End: 2021-02-27

## 2021-02-27 NOTE — PLAN OF CARE
Alert. Oriented. Sr per tele. Hr 80s. On pca dilaudid. Partial relief only. On iv steroids. Ice pack to chest-helps w/ the pain. Declined scds and . Toradol iv given for addtl pain relief. Poc  discussed with pt.

## 2021-02-27 NOTE — PROGRESS NOTES
Pulmonary Progress Note        NAME: Kimmy Pack - ROOM: 2465/6528-F - MRN: [id number] - Age: 54year old - : 1965    Past Medical History:   Diagnosis Date   • Calculus of kidney    • Chronic cough    • Decorative tattoo    • Esophageal reflu auscultation bilaterally. Heart:   S1, S2 normal,    Abdomen:   Soft, non-tender, non distended   Extremities:  no cyanosis or edema.    Neurologic: Oriented  Times 3       Recent Labs   Lab 02/25/21  0542 02/26/21  0514 02/27/21  0506   RBC 5.74* 5.73* 5

## 2021-02-27 NOTE — PROGRESS NOTES
[facility]  Progress Note     Tong Gaytan Patient Status:  Observation    1965 MRN [id number]   [facility] 8NE-A Attending Cate Looney,    Hosp Day # 15 PCP Derick Washington MD     Chief Complaint: Chest pain, SOB    S 58  --    AST  --  20  --    ALT  --  49  --    BILT  --  1.4  --    TP  --  6.3*  --      Estimated Creatinine Clearance: 65.1 mL/min (based on SCr of 1.24 mg/dL). No results for input(s): PTP, INR in the last 168 hours.   Recent Labs   Lab 02/23/21  15 improvement. Pulm pulsed patient with 1g solumedrol 2/24 with slight improvement. Was tapered to decadron 10mg BID 2/25 but had worsening pain and now back on solumedrol 125 mg q6hr on 2/26  11. Continue home inhalers  12.  Pulm discussed with [facility],

## 2021-02-27 NOTE — PLAN OF CARE
Paitent aox3, vss, NSR, ra, lungs crackly (sarcoidosis flair up), positive bowel sounds, prune juice, miralax and milk of mag given.  Pain 9/10 on Dilaudid PCA at 0.2 mg, 10 min lock out, 1.2 mg hourly limit, 2 norco, 125 mg solumedrol IVP, lisinpril 5 mg a

## 2021-02-28 ENCOUNTER — APPOINTMENT (OUTPATIENT)
Dept: CT IMAGING | Facility: HOSPITAL | Age: 56
DRG: 981 | End: 2021-02-28
Attending: INTERNAL MEDICINE
Payer: COMMERCIAL

## 2021-02-28 PROCEDURE — 74177 CT ABD & PELVIS W/CONTRAST: CPT | Performed by: INTERNAL MEDICINE

## 2021-02-28 PROCEDURE — 99232 SBSQ HOSP IP/OBS MODERATE 35: CPT | Performed by: INTERNAL MEDICINE

## 2021-02-28 RX ORDER — SODIUM CHLORIDE 9 MG/ML
INJECTION, SOLUTION INTRAVENOUS CONTINUOUS
Status: DISCONTINUED | OUTPATIENT
Start: 2021-02-28 | End: 2021-03-04

## 2021-02-28 RX ORDER — SCOLOPAMINE TRANSDERMAL SYSTEM 1 MG/1
1 PATCH, EXTENDED RELEASE TRANSDERMAL
Status: DISCONTINUED | OUTPATIENT
Start: 2021-02-28 | End: 2021-03-13

## 2021-02-28 RX ORDER — MYCOPHENOLATE MOFETIL 250 MG/1
750 CAPSULE ORAL
Status: DISCONTINUED | OUTPATIENT
Start: 2021-02-28 | End: 2021-03-07

## 2021-02-28 NOTE — PROGRESS NOTES
Pulmonary Progress Note        NAME: Santa Roper - ROOM: 5278/7282-N - MRN: [id number] - Age: 54year old - : 1965    Past Medical History:   Diagnosis Date   • Calculus of kidney    • Chronic cough    • Decorative tattoo    • Esophageal reflu auscultation bilaterally. Heart:   S1, S2 normal,    Abdomen:   Soft, non-tender, mildly distended   Extremities:  no cyanosis or edema.    Neurologic: Oriented  Times 3       Recent Labs   Lab 02/26/21  0514 02/27/21  0506 02/28/21  0445   RBC 5.73* 5.33

## 2021-02-28 NOTE — PLAN OF CARE
Patient aox3, exhausted, 2l nc 95%, RA 85%, lungs crackly, DC'ed Dilaudid PCA. Solumedrol 125 mg IVP, Reglan and Dilaudid 0.4 mg IVP given. Patient gets anxious easily, removes pulse ox and takes off nasal cannula when it bothers him.  Ativan 0.5 mg IVP

## 2021-02-28 NOTE — PROGRESS NOTES
[facility] H[facility]ITAL[facility]  Progress Note     Ania Blandon Patient Status:  Observation    1965 MRN [id number]   [facility] 8NE-A Attending Kemi Valderrama DO   Hosp Day # 12 PCP [employer] MD Yung     Chief Complaint: Chest pain, SOB    S 128* 136   K 3.8 4.5 3.8   CL 92* 92* 96*   CO2 31.0 30.0 35.0*   ALKPHO 58  --   --    AST 20  --   --    ALT 49  --   --    BILT 1.4  --   --    TP 6.3*  --   --      Estimated Creatinine Clearance: 84.1 mL/min (based on SCr of 0.96 mg/dL).     No results plan to increase to 1000 mg BID slowly  10. Allergic to prednisone, was on solumedrol for over 2 weeks without significant improvement. Pulm pulsed patient with 1g solumedrol 2/24 with slight improvement.  Was tapered to decadron 10mg BID 2/25 but had worse

## 2021-02-28 NOTE — PLAN OF CARE
Alert. Oriented. S.tach per tele. Hr 110s. Pt received ativan 0.5iv for anxiety as ordered 1x and pt got confused. Remained nauseaus. Compazine x1 given, reglan iv given as ordered. Remained restless. Did not want to eat dinner. Poor oral intake.  O2 sat 85

## 2021-03-01 ENCOUNTER — APPOINTMENT (OUTPATIENT)
Dept: MRI IMAGING | Facility: HOSPITAL | Age: 56
DRG: 981 | End: 2021-03-01
Attending: NURSE PRACTITIONER
Payer: COMMERCIAL

## 2021-03-01 PROCEDURE — 76376 3D RENDER W/INTRP POSTPROCES: CPT | Performed by: NURSE PRACTITIONER

## 2021-03-01 PROCEDURE — 99232 SBSQ HOSP IP/OBS MODERATE 35: CPT | Performed by: INTERNAL MEDICINE

## 2021-03-01 PROCEDURE — 99254 IP/OBS CNSLTJ NEW/EST MOD 60: CPT | Performed by: PHYSICIAN ASSISTANT

## 2021-03-01 PROCEDURE — 74181 MRI ABDOMEN W/O CONTRAST: CPT | Performed by: NURSE PRACTITIONER

## 2021-03-01 PROCEDURE — 90792 PSYCH DIAG EVAL W/MED SRVCS: CPT | Performed by: OTHER

## 2021-03-01 RX ORDER — SULFAMETHOXAZOLE AND TRIMETHOPRIM 800; 160 MG/1; MG/1
1 TABLET ORAL
Status: DISCONTINUED | OUTPATIENT
Start: 2021-03-01 | End: 2021-04-01

## 2021-03-01 RX ORDER — ALPRAZOLAM 0.25 MG/1
0.25 TABLET ORAL 3 TIMES DAILY PRN
Status: DISCONTINUED | OUTPATIENT
Start: 2021-03-01 | End: 2021-03-02

## 2021-03-01 RX ORDER — ALPRAZOLAM 0.25 MG/1
0.25 TABLET ORAL ONCE
Status: COMPLETED | OUTPATIENT
Start: 2021-03-01 | End: 2021-03-01

## 2021-03-01 NOTE — PROGRESS NOTES
[facility]  Progress Note     Triciadagmar Farley Patient Status:  Observation    1965 MRN [id number]   [facility] 8NE-A Attending Laila Streeterr, [address] [facility] Day # 16 PCP Dolores Pickett MD     Chief Complaint: Chest pain, SOB    S 8.7 8.8   ALB 3.2*  --   --    * 128* 136   K 3.8 4.5 3.8   CL 92* 92* 96*   CO2 31.0 30.0 35.0*   ALKPHO 58  --   --    AST 20  --   --    ALT 49  --   --    BILT 1.4  --   --    TP 6.3*  --   --      Estimated Creatinine Clearance: 84.1 mL/min (bas pulsed patient with 1g solumedrol 2/24 with slight improvement. Was tapered to decadron 10mg BID 2/25 but had worsening pain and now back on solumedrol 125 mg q6hr on 2/26 8.  Home Plaquenil discontinued and cellcept started 2/22 with plan to increase to 1

## 2021-03-01 NOTE — PROGRESS NOTES
Consult noted  Chart reviewed    PROCEDURE:  CT ABDOMEN+PELVIS (CONTRAST ONLY) (CPT=74177)     COMPARISON:  [facility] , CT, CT ANGIOGRAPHY, CHEST (CPT=71275), 2/11/2021, 4:42 PM.  [facility] , CT, CT ANGIOGRAPHY, CHEST (CPT=71275), 2/26/2021, 7:32 AM.  Mansi Beth mesenteric lymph nodes. BONES:  Degenerative changes in the spine. OTHER:  There is extensive soft tissue attenuation encasing the distal duodenum/proximal jejunum (series 4, image 48). There is heterogeneous fat stranding within the central mesentery.

## 2021-03-01 NOTE — PROGRESS NOTES
[id number] [facility] Patient Status:  Inpatient    1965 MRN [id number]   [facility] 8NE-A Attending Franco Kent, DO   Hosp Day # 16 PCP Meme Edwards MD     SUBJECTIVE: Pt states pain is unchanged from previous.   Co Q6H PRN  •  Senna-Docusate Sodium (SENOKOT S) 8.6-50 MG tab 2 tablet, 2 tablet, Oral, Daily PRN  •  lidocaine-menthol 4-1 % 3 patch, 3 patch, Transdermal, Daily  •  benzonatate (TESSALON) cap 100 mg, 100 mg, Oral, TID PRN  •  guaiFENesin (ROBITUSSIN) 100mg with no pulmonary embolus but did show ground glass opacities. Bronch done but no growth to date. No PJP, flow negative.   case was discussed with Dr. Hayder Valdovinos at [facility] where he follows who recommended continued cellcept with increase to goal of 750 mg BID

## 2021-03-01 NOTE — CONSULTS
[facility]        Patient Name:  Luda Pickett   YOB: 1965   Gender:  Male   Appt Date:  3/1/2021   Provider:  Jessi Pelletier MD     PATIENT PROVIDERS  [address] [place] [address], MD   Phone #: [phone] He then developed nausea resulting in CT abd/pelvis to be performed on 2/28/2021.  This revealed extensive inflammatory heterogeneous soft tissue attenuation encasing the distal duodenum and proximal jejunum and extending into the central mesentery and righ •  Hydroxychloroquine Sulfate 200 MG Oral Tab, Take 1 tablet (200 mg total) by mouth 2 (two) times daily. , Disp: 180 tablet, Rfl: 1         Allergies Reviewed:    Prednisone              HIVES    Comment:generalized     History:  Reviewed:  Past Medical • Other (Other) Father         cva   • Diabetes Mother    • Heart Disorder Mother    • Other (Other) Mother         cva   • Diabetes Sister         Review of Systems:  · GENERAL HEALTH:+ fatigue.     · HEENT: denies pain  · RESPIRATORY: + shortness of breat Clinical correlation with serum lipase/amylase is recommended. No free intra-abdominal air is   identified. The 2.8 cm mass inseparable from the ventral head of the pancreas may represent focal phlegmon/early abscess or a neoplastic process.   Please see

## 2021-03-01 NOTE — CONSULTS
[facility]                       Gastroenterology [address] [facility]enter[facility]    Tami Menezes Patient Status:  Inpatient    1965 MRN [id number]   [facility] 8NE-A Attending Fabby Villafuerte, [address] [facility] Day # 16 PCP Trev Sarcoidosis    • Shortness of breath     no oxygen   • Sleep apnea    • Weight loss    • Wheezing      PSHx:   Past Surgical History:   Procedure Laterality Date   • BIOPSY/REMOVAL, LYMPH NODE(S) Bilateral 2013    Lymph node removal (neck)   • BRONCHOSCOPY Calcium Carbonate Antacid (TUMS) chewable tab 500 mg, 500 mg, Oral, Daily PRN    •  Alum & Mag Hydroxide-Simeth (MAALOX) oral suspension 30 mL, 30 mL, Oral, QID PRN    •  amLODIPine Besylate (NORVASC) tab 10 mg, 10 mg, Oral, Daily    •  [COMPLETED] iohexol HCl (DILAUDID) 1 MG/ML injection 0.5 mg, 0.5 mg, Intravenous, Once    •  [COMPLETED] ondansetron HCl (ZOFRAN) injection 4 mg, 4 mg, Intravenous, Once    •  [COMPLETED] Potassium Chloride ER (K-DUR M20) CR tab 40 mEq, 40 mEq, Oral, Once    •  [] 0.9% disorders            Rheumatologic: + sarcoidosis             Genitourinary:  + nephrolithiasis           Psychiatric: The patient reports no history of depression, anxiety, suicidal ideation, or homicidal ideation           Oncologic: The patient reports COMPARISON:  [facility] , CT, CT ANGIOGRAPHY, CHEST (CPT=71275), 2/11/2021, 4:42 PM.  [facility] , CT, CT ANGIOGRAPHY, CHEST (CPT=71275), 2/26/2021, 7:32 AM.  [facility], CT, CT ABDOMEN+PELVIS KIDNEYSTONE 2D RNDR(NO IV,NO ORAL)(CPT=74176), 12/15/2018, 5:32 PM. tissue attenuation encasing the distal duodenum/proximal jejunum (series 4, image 48). There is heterogeneous fat stranding within the central mesentery.   There is fluid and fat stranding filling the right pericolic   gutter.  =====  CONCLUSION:  Markedly distal duodenal/prox jejunal ulcer vs adenocarcinoma of pancreatic head with surrounding inflammatory changes d/t pancreatitis; CA 19-9 91 with normal lipase and WBC 34 (chronic since admission and tx with steroids).  Abd is soft, distended and tympanic in

## 2021-03-01 NOTE — PROGRESS NOTES
Consult noted  CT reviewed by myself and discussed over phone with radiology on call  RP inflammation near Duodenum and LT. No contrast extravasation or air. Patient stable  Pancreatic mass? Difficult to tell with inflammation.  No PD dilation or CBD dilat

## 2021-03-01 NOTE — PAYOR COMM NOTE
--------------  CONTINUED STAY REVIEW    Payor: JAD KLINE  Subscriber #:  [id number]  Authorization Number: [id number]    Admit date: 2/12/21  Admit time: 65    FAXING CLINICAL UPDATE FOR 2/27/21-3/1/21    2/27/21   Chief Complaint: Chest pain, SOB    grade 1 diastolic dysfunction. Inflammatory markers unimpressive. Will repeat again today  3. PCA pump for pain control. Added toradol again 2/26. Also getting norco. Recommended to continue using ice packs  4. Trial of colchicine 2/27  5.  Pulm following, Exam:    General: Awake and alert. Respiratory: Clear to auscultation bilaterally. No wheezes. No rhonchi. Cardiovascular: S1, S2. Tachycardic but regular. No murmurs, rubs or gallops. Abdomen: Soft, nontender, nondistended. Positive bowel sounds.  No abx  9. Home Plaquenil discontinued and cellcept started 2/22 with plan to increase to 1000 mg BID slowly  10. Allergic to prednisone, was on solumedrol for over 2 weeks without significant improvement.  Pulm pulsed patient with 1g solumedrol 2/24 with slig sounds. No rebound or guarding. Neurologic: No focal neurological deficits. Musculoskeletal: Moves all extremities. Extremities: No edema.     Lab 02/23/21  0527 02/24/21  0439 02/25/21  0542 02/26/21  0514 02/27/21  0506 02/28/21  0445   WBC 26.6* 28. discontinued and cellcept started 2/22 with plan to increase to 1000 mg BID slowly  9. Stop Colchicine as no improvement in symptoms  10.  CRP increased significantly since arrival despite immunosuppression, suspect it may be related to his abdominal pathol Date Action Dose Route     3/1/2021 0822 Given 0.5 mg Intravenous     3/1/2021 0449 Given 0.4 mg Intravenous     3/1/2021 0240 Given 0.4 mg Intravenous     3/1/2021 0022 Given 0.4 mg Intravenous     2/28/2021 2223 Given 0.4 mg Intravenous     2/28/2021

## 2021-03-01 NOTE — PLAN OF CARE
Received pt at 1930. A&o x4. NSR/ST on tele. VSS. C/o chest pain and nausea. Relieved by dilaudid, Reglan, and scopolamine patch. WNL on room air. Up stand by. Last BM 2/25 with good urine output.     Problem: CARDIOVASCULAR - ADULT  Goal: Maintains optimal patient/family/discharge partner  - Complete POLST form as appropriate  - Assess patient's ability to be responsible for managing their own health  - Refer to Case Management Department for coordinating discharge planning if the patient needs post-hospital indicated or ordered  - Monitor response to interventions for patient's volume status, including labs, urine output, blood pressure (other measures as available)  - Encourage oral intake as appropriate  - Instruct patient on fluid and nutrition restriction

## 2021-03-02 PROCEDURE — 99232 SBSQ HOSP IP/OBS MODERATE 35: CPT | Performed by: INTERNAL MEDICINE

## 2021-03-02 PROCEDURE — 3078F DIAST BP <80 MM HG: CPT | Performed by: INTERNAL MEDICINE

## 2021-03-02 PROCEDURE — 3075F SYST BP GE 130 - 139MM HG: CPT | Performed by: INTERNAL MEDICINE

## 2021-03-02 PROCEDURE — 99232 SBSQ HOSP IP/OBS MODERATE 35: CPT | Performed by: OTHER

## 2021-03-02 PROCEDURE — 3008F BODY MASS INDEX DOCD: CPT | Performed by: INTERNAL MEDICINE

## 2021-03-02 RX ORDER — TRAZODONE HYDROCHLORIDE 50 MG/1
50 TABLET ORAL NIGHTLY
Status: DISCONTINUED | OUTPATIENT
Start: 2021-03-02 | End: 2021-03-03

## 2021-03-02 RX ORDER — ALPRAZOLAM 0.5 MG/1
0.5 TABLET ORAL 3 TIMES DAILY PRN
Status: DISCONTINUED | OUTPATIENT
Start: 2021-03-02 | End: 2021-03-11

## 2021-03-02 NOTE — PROGRESS NOTES
[facility]  Progress Note     Luda Pickett Patient Status:  Observation    1965 MRN [id number]   [facility] 8NE-A Attending Van Comer, DO   Hosp Day # 25 PCP Megan Salazar MD     Chief Complaint: Chest pain, SOB    S AST 20  --  45* 18   ALT 49  --  38 31   BILT 1.4  --  1.4 0.9   TP 6.3*  --  6.4 5.2*     Estimated Creatinine Clearance: 84.1 mL/min (based on SCr of 0.96 mg/dL). No results for input(s): PTP, INR in the last 168 hours.   Recent Labs   Lab 02/23/21 pulsed patient with 1g solumedrol 2/24 with slight improvement. Was tapered to decadron 10mg BID 2/25 but had worsening pain and now back on solumedrol 125 mg q6hr on 2/26 8.  Home Plaquenil discontinued and cellcept started 2/22 with plan to increase to 1

## 2021-03-02 NOTE — PROGRESS NOTES
[id number] [email or web address] Patient Status:  Inpatient    1965 MRN [id number]   [facility] 8NE-A Attending Florence Martines, DO   Hosp Day # 18 PCP Daisha Llanos MD     SUBJECTIVE: Pt states pain and SOB are unchanged from prev 1 MG/ML injection 0.8 mg, 0.8 mg, Intravenous, Q2H PRN **OR** HYDROmorphone HCl (DILAUDID) 1 MG/ML injection 1.2 mg, 1.2 mg, Intravenous, Q2H PRN  •  traMADol HCl (ULTRAM) tab 50 mg, 50 mg, Oral, Q6H PRN  •  Senna-Docusate Sodium (SENOKOT S) 8.6-50 MG tab Component Value Date     03/02/2021    K 3.6 03/02/2021     03/02/2021    CO2 28.0 03/02/2021    BUN 27 03/02/2021    CREATSERUM 0.96 03/02/2021     03/02/2021    CA 7.8 03/02/2021    ALKPHO 45 03/02/2021    ALT 31 03/02/2021    AST 18

## 2021-03-02 NOTE — PLAN OF CARE
Pt still having abdominal/lower chest pain requiring frequent administration of medications with only temporary relief.  He left the floor via bed for MRI of abdomen, approved to have soft diet by surgical team upon return from test. SR-ST on the monitor, s

## 2021-03-02 NOTE — PROGRESS NOTES
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 This is difficult to fully delineate. MRI examination with contrast would be helpful. The previously measure possible pancreatic mass demonstrates central fluid. 6 weeks. -There are no acute surgical issues.  -Discussed with patient and wife.       Care discussed as above    Carmen Felipe MD

## 2021-03-02 NOTE — DIETARY NOTE
[address]     Admitting diagnosis:  Sarcoidosis [D86.9]  Hypokalemia [E87.6]  Chest pain of uncertain etiology [L71.6]    Ht: 172.7 cm (5' 8\")  Wt: 92.7 kg (204 lb 6.4 oz).    BMI: Body mass index is 31.08 kg/

## 2021-03-02 NOTE — PLAN OF CARE
Assumed care of pt at [address] [place] [address], having just returned from MRI, a/o x4 in no apparent distress watching TV. Monitor shows sinus rhythm/tachy at times with stable BP. IV site patent and without redness or swelling noted w/IVF infusing.  Dr Cecelia Miles was here to see ADULT  Goal: Electrolytes maintained within normal limits  Description: INTERVENTIONS:  - Monitor labs and rhythm and assess patient for signs and symptoms of electrolyte imbalances  - Administer electrolyte replacement as ordered  - Monitor response to el

## 2021-03-02 NOTE — CONSULTS
[facility]  Report of Psychiatric Consultation    Ro Bush Patient Status:  Inpatient    1965 MRN [id number]   [facility] 8NE-A Attending Fabrizio Castillo DO   Hosp Day # 16 PCP Eufemia Rodriguez MD     Date of Admission:  voices/visions or paranoid ideation. No elevated mood, racing thoughts, decreased need for sleep, grandiose thoughts. Past Psychiatric History: None    Psych Family History: None    Social and Developmental History:   with 2 children.  [place] she smoking history. He has never used smokeless tobacco. He reports current alcohol use of about 4.0 standard drinks of alcohol per week. He reports that he does not use drugs.     Allergies:    Prednisone              HIVES    Comment:generalized    Medicatio mg, 100 mg, Oral, Q4H PRN  •  fenofibrate micronized (LOFIBRA) cap 201 mg, 201 mg, Oral, QAM AC  •  Fluticasone Furoate-Vilanterol (BREO ELLIPTA) 200-25 MCG/INH inhaler 1 puff, 1 puff, Inhalation, Daily  •  atorvastatin (LIPITOR) tab 20 mg, 20 mg, Oral, Ni and/or adenocarcinoma of the pancreatic head with surrounding inflammatory changes from pancreatitis. The inflammatory change demonstrates flattening of the superior mesenteric vein which is patent. ERCP with EUS is suggested for further assessment.   Clini

## 2021-03-02 NOTE — PROGRESS NOTES
[address] [facility] Gastroenterology     Martha Brito Patient Status:  Inpatient    1965 MRN [id number]   [facility] 8NE-A Attending Stephen Cheng, 160[address] [facility] Day # 25 PCP Erika Tijerina MD guarding. +BS  Skin:  Jaundice absent. Neuro: Aox3.      Diagnostic Data:      Labs:  Recent Labs   Lab 02/25/21  0542 02/26/21  0514 02/27/21  0506 02/28/21  0445 03/02/21  0500   WBC 35.5* 45.7* 46.2* 33.7* 20.1*   HGB 17.8* 17.7* 16.5 14.8 12.5*   MCV 9 findings, and no acute surgical issues given imaging showed possilble perforation in SB. Abd soft, ND; no rebound or guarding.   -No CBD dilation. GB abseent. -MR showed inflammatory changes; mesenteritic edema.  Spiculated region, possible pancreatic mas

## 2021-03-02 NOTE — PROGRESS NOTES
PSYCH CONSULT    Date of Admission: 2/10/21  Date of Consult: 3/1/21  Reason for Consultation: Anxiety    Impression:  Primary Psychiatric Diagnosis:  Adjustment disorder with mixed anxiety and depressed mood.     Rule Out Diagnoses:  Anxiety due to general

## 2021-03-02 NOTE — PLAN OF CARE
A/Ox4. VSS. Afebrile. Lung sounds clear and diminished, room air. NSR/ST on telemetry. Bowel sounds active. ABD distended but soft. Pt states he had a BM this morning and feels better since, but still complains of moderate abd pain.  Pain tx with IV Dil Discharge to home or other facility with appropriate resources  Description: INTERVENTIONS:  - Identify barriers to discharge w/pt and caregiver  - Include patient/family/discharge partner in discharge planning  - Arrange for needed discharge resources and and rhythm and assess patient for signs and symptoms of electrolyte imbalances  - Administer electrolyte replacement as ordered  - Monitor response to electrolyte replacements, including rhythm and repeat lab results as appropriate  - Fluid restriction as

## 2021-03-03 PROCEDURE — 99233 SBSQ HOSP IP/OBS HIGH 50: CPT | Performed by: INTERNAL MEDICINE

## 2021-03-03 PROCEDURE — 99232 SBSQ HOSP IP/OBS MODERATE 35: CPT | Performed by: OTHER

## 2021-03-03 RX ORDER — ACETAMINOPHEN 10 MG/ML
1000 INJECTION, SOLUTION INTRAVENOUS EVERY 6 HOURS
Status: DISCONTINUED | OUTPATIENT
Start: 2021-03-03 | End: 2021-03-04

## 2021-03-03 RX ORDER — DICYCLOMINE HYDROCHLORIDE 10 MG/1
10 CAPSULE ORAL
Status: DISCONTINUED | OUTPATIENT
Start: 2021-03-03 | End: 2021-03-06

## 2021-03-03 RX ORDER — TRAZODONE HYDROCHLORIDE 100 MG/1
100 TABLET ORAL NIGHTLY
Status: DISCONTINUED | OUTPATIENT
Start: 2021-03-03 | End: 2021-03-11

## 2021-03-03 RX ORDER — METHYLPREDNISOLONE SODIUM SUCCINATE 125 MG/2ML
80 INJECTION, POWDER, LYOPHILIZED, FOR SOLUTION INTRAMUSCULAR; INTRAVENOUS EVERY 6 HOURS
Status: DISCONTINUED | OUTPATIENT
Start: 2021-03-03 | End: 2021-03-05

## 2021-03-03 RX ORDER — OXYCODONE HYDROCHLORIDE 5 MG/1
5 TABLET ORAL EVERY 4 HOURS
Status: DISCONTINUED | OUTPATIENT
Start: 2021-03-03 | End: 2021-03-04

## 2021-03-03 RX ORDER — ONDANSETRON 2 MG/ML
4 INJECTION INTRAMUSCULAR; INTRAVENOUS EVERY 4 HOURS PRN
Status: DISCONTINUED | OUTPATIENT
Start: 2021-03-03 | End: 2021-03-07

## 2021-03-03 NOTE — PROGRESS NOTES
Pulmonary Progress Note     Assessment / Plan:  1.  Atypical chest pain- could be sarcoidosis flair triggering pleurisy vs atypical infectious process vs referred pain from abdominal process as noted on CT a/p. CT chest with no pulmonary embolus but did yolanda affect    Medications:  Reviewed in EMR    Lab Data:  Reviewed in EMR    Imaging:  I independently visualized all relevant chest imaging in PACS and agree with radiology interpretation except where noted.

## 2021-03-03 NOTE — DIETARY NOTE
[address]     Admitting diagnosis:  Sarcoidosis [D86.9]  Hypokalemia [E87.6]  Chest pain of uncertain etiology [M31.7]    Ht: 172.7 cm (5' 8\")  Wt: 92.7 kg (204 lb 6.4 oz).    BMI: Body mass index is 31.08 kg/

## 2021-03-03 NOTE — PROGRESS NOTES
[facility] Pharmacy Note:  Acetaminophen Therapeutic Duplication      Tong Gaytan is a 54year old patient who has been prescribed scheduled IV acetaminophen (Ofirmev), acetaminophen oral, and Norco. Acetaminophen oral and Norco were discontinued per P&T ruperto

## 2021-03-03 NOTE — PROGRESS NOTES
[facility]  Report of Consultation    Ro Bush Patient Status:  Inpatient    1965 MRN [id number]   [facility] 8NE-A Attending Leesa Mason DO   Hosp Day # 23 PCP Eufemia Rodriguez MD     Date of Admission:  2/10/20 blood pressure    • High cholesterol    • Hyperlipidemia    • Loss of appetite    • Night sweats    • Obstructive apnea 07/26/2017    AHI 24 RDI 26 SaO2 clay 82 % CPAP 13 Premier   • Pain in joints    • Sarcoidosis    • Shortness of breath     no oxygen Intravenous, Once  •  oxyCODONE HCl (OXY-IR) cap/tab 5 mg, 5 mg, Oral, Q4H  •  acetaminophen (OFIRMEV) infusion 1,000 mg, 1,000 mg, Intravenous, Q6H  •  traZODone HCl (DESYREL) tab 50 mg, 50 mg, Oral, Nightly  •  ALPRAZolam (XANAX) tab 0.5 mg, 0.5 mg, Oral (BREO ELLIPTA) 200-25 MCG/INH inhaler 1 puff, 1 puff, Inhalation, Daily  •  atorvastatin (LIPITOR) tab 20 mg, 20 mg, Oral, Nightly  •  acetaminophen (TYLENOL) tab 650 mg, 650 mg, Oral, Q6H PRN  •  melatonin tab 3 mg, 3 mg, Oral, Nightly PRN  •  PEG 3350 (M Postoperative changes from a right lower lobe wedge resection. LIVER:  Unremarkable. BILIARY:  Cholecystectomy. SPLEEN:  Unremarkable. PANCREAS:  On image 55 of series 2, there is a 2.8 x 2.2 cm mass arising from the ventral head of the pancreas.   Ther intra-abdominal air is   identified. The 2.8 cm mass inseparable from the ventral head of the pancreas may represent focal phlegmon/early abscess or a neoplastic process. Please see above for further details.        PROCEDURE:  MRI ABDOMEN&MRCP W/3D (CPT= WALL:  No mass or hernia.          =====  CONCLUSION:       Inflammatory changes adjacent to the pancreatic head and uncinate process extending in a caudal direction is noted. This may be a result the pancreatitis.   There is a mild amount of mesenteric ed discussion of plan  Patient tolerated CLD for lunch - denies nausea at present  Per Dr. David Johnson - will trial oral medications in preparation for discharge home soon    Fentanyl 25mcg x1 now to see if patient has less nausea    Start OxyIR 5mg po q4h scheduled

## 2021-03-03 NOTE — PROGRESS NOTES
[facility]  Report of Psychiatric Progress Note    Martha Brito Patient Status:  Inpatient    1965 MRN [id number]   [facility] 8NE-A Attending Stephen Cheng, DO   Hosp Day # 23 PCP Erika Tijerina MD     Date of Admission:  Interval Hx:  3/2/21-  The Xanax 0.25mg dose did not help with anxiety or sleep. He feels anxious when he has dyspnea and chest pain. He has depressed mood, but no hopelessness or suicidal ideation. He c/o INSOMNIA, both falling and staying asleep. 11/2015     schatzki's ring, non obstructive. Erosive esophagitis, LA grad 1 (no barretts on path).  Gastritis, no hpylori on path   • ENDOBRONCHIAL ULTRASOUND (EBUS) N/A 2/16/2021    Performed by Lis Ludwig MD at [facility] ENDOSCOPY   • NEEDLE BIOPSY LIVER (MAALOX) oral suspension 30 mL, 30 mL, Oral, QID PRN  •  amLODIPine Besylate (NORVASC) tab 10 mg, 10 mg, Oral, Daily  •  HYDROcodone-acetaminophen (NORCO) 5-325 MG per tab 1-2 tablet, 1-2 tablet, Oral, Q6H PRN  •  HYDROmorphone HCl (DILAUDID) 1 MG/ML injec hallucinations  Associations: Intact    Orientation: oriented person, place and oriented situation  Attention and Concentration:   fair  Memory:  intact recent and intact remote  Language: did not test naming and repetition     Insight: fair  Judgment: bertin

## 2021-03-03 NOTE — PLAN OF CARE
Patient alert and oriented x4. Vital signs stable, NSR/sinus tach on telemetry. On room air, lungs diminished bilaterally. No edema noted. Abdomen round, distended, non-tender to touch. Bowel sounds active, patient did have bowel movements yesterday.  NPO p ADULT - FALL  Goal: Free from fall injury  Description: INTERVENTIONS:  - Assess pt frequently for physical needs  - Identify cognitive and physical deficits and behaviors that affect risk of falls. - [place] fall precautions as indicated by assessment. medication profile  Outcome: Progressing  Goal: Maintains adequate nutritional intake (undernourished)  Description: INTERVENTIONS:  - Monitor percentage of each meal consumed  - Identify factors contributing to decreased intake, treat as appropriate  - As

## 2021-03-03 NOTE — PROGRESS NOTES
[facility]  Progress Note     Jadyn Roxie Patient Status:  Observation    1965 MRN [id number]   [facility] 8NE-A Attending Traci Resendiz, DO   Hosp Day # 23 PCP Columbus Rubinstein, MD     Chief Complaint: Chest pain, SOB    S 96* 105   CO2 31.0 30.0 35.0* 28.0   ALKPHO 58  --  58 45   AST 20  --  45* 18   ALT 49  --  38 31   BILT 1.4  --  1.4 0.9   TP 6.3*  --  6.4 5.2*     Estimated Creatinine Clearance: 84.1 mL/min (based on SCr of 0.96 mg/dL).     No results for input(s): PTP slowly  8. Continue home inhalers  9. Pulm discussed with [address] [place] [address], patient will need follow up there  2. Pancreatic mass with surrounding small bowel inflammation, concerning walled off perforation or neoplasm given elevated CA 19-9. MRI/MRCP noted  1.

## 2021-03-03 NOTE — PROGRESS NOTES
[facility]  Report of Psychiatric Progress Note    Erla Ear Patient Status:  Inpatient    1965 MRN [id number]   [facility] 8NE-A Attending Josseline Pierre, DO   Hosp Day # 25 PCP Graham Herrera MD     Date of Admission:  has depressed mood, but no hopelessness or suicidal ideation. He c/o INSOMNIA, both falling and staying asleep. Psychiatry Review Systems: He denies voices/visions or paranoid ideation.  No elevated mood, racing thoughts, decreased need for sleep, grand Heart Disorder Mother    • Other (Other) Mother         cva   • Diabetes Sister       reports that he has been smoking cigarettes. He has a 2.00 pack-year smoking history.  He has never used smokeless tobacco. He reports current alcohol use of about 4.0 sta tab 2 tablet, 2 tablet, Oral, Daily PRN  •  lidocaine-menthol 4-1 % 3 patch, 3 patch, Transdermal, Daily  •  benzonatate (TESSALON) cap 100 mg, 100 mg, Oral, TID PRN  •  guaiFENesin (ROBITUSSIN) 100mg/5ml LIQUID 100 mg, 100 mg, Oral, Q4H PRN  •  fenofibrat ventral head of the pancreas. Differential considerations would include a perforated distal duodenal/proximal jejunal ulcer and/or adenocarcinoma of the pancreatic head with surrounding inflammatory changes from pancreatitis. The inflammatory change demons

## 2021-03-04 PROCEDURE — 99232 SBSQ HOSP IP/OBS MODERATE 35: CPT | Performed by: OTHER

## 2021-03-04 PROCEDURE — 99232 SBSQ HOSP IP/OBS MODERATE 35: CPT | Performed by: INTERNAL MEDICINE

## 2021-03-04 RX ORDER — OXYCODONE HYDROCHLORIDE 10 MG/1
10 TABLET ORAL ONCE
Status: COMPLETED | OUTPATIENT
Start: 2021-03-04 | End: 2021-03-04

## 2021-03-04 RX ORDER — OXYCODONE HYDROCHLORIDE 10 MG/1
10 TABLET ORAL EVERY 4 HOURS
Status: DISCONTINUED | OUTPATIENT
Start: 2021-03-04 | End: 2021-03-04

## 2021-03-04 RX ORDER — OXYCODONE HCL 20 MG/1
20 TABLET, FILM COATED, EXTENDED RELEASE ORAL EVERY 12 HOURS SCHEDULED
Qty: 60 TABLET | Refills: 0 | Status: SHIPPED | OUTPATIENT
Start: 2021-03-04 | End: 2021-03-05

## 2021-03-04 RX ORDER — NALOXONE HYDROCHLORIDE 4 MG/.1ML
4 SPRAY, METERED NASAL AS NEEDED
Qty: 1 KIT | Refills: 0 | Status: ON HOLD | OUTPATIENT
Start: 2021-03-04 | End: 2021-04-14

## 2021-03-04 RX ORDER — OXYCODONE HCL 10 MG/1
20 TABLET, FILM COATED, EXTENDED RELEASE ORAL EVERY 8 HOURS
Status: DISCONTINUED | OUTPATIENT
Start: 2021-03-04 | End: 2021-03-04

## 2021-03-04 RX ORDER — PANTOPRAZOLE SODIUM 40 MG/1
40 TABLET, DELAYED RELEASE ORAL
Status: DISCONTINUED | OUTPATIENT
Start: 2021-03-04 | End: 2021-03-06

## 2021-03-04 RX ORDER — OXYCODONE HYDROCHLORIDE 5 MG/1
5 TABLET ORAL ONCE
Status: DISCONTINUED | OUTPATIENT
Start: 2021-03-04 | End: 2021-03-04

## 2021-03-04 RX ORDER — ACETAMINOPHEN 500 MG
1000 TABLET ORAL
Status: DISCONTINUED | OUTPATIENT
Start: 2021-03-04 | End: 2021-03-07

## 2021-03-04 RX ORDER — OXYCODONE HYDROCHLORIDE 10 MG/1
20 TABLET ORAL
Status: DISCONTINUED | OUTPATIENT
Start: 2021-03-04 | End: 2021-03-06

## 2021-03-04 RX ORDER — OXYCODONE HCL 10 MG/1
20 TABLET, FILM COATED, EXTENDED RELEASE ORAL EVERY 12 HOURS SCHEDULED
Status: DISCONTINUED | OUTPATIENT
Start: 2021-03-04 | End: 2021-03-06

## 2021-03-04 NOTE — PROGRESS NOTES
[facility]  Progress Note     Destin Pen Patient Status:  Observation    1965 MRN [id number]   [facility] 8NE-A Attending Reinaldo Arnold, [address] [facility] Day # 21 PCP Gerhardt Guile, MD     Chief Complaint: Chest pain, SOB    S 2.5*   * 128* 136 139   K 3.8 4.5 3.8 3.6   CL 92* 92* 96* 105   CO2 31.0 30.0 35.0* 28.0   ALKPHO 58  --  58 45   AST 20  --  45* 18   ALT 49  --  38 31   BILT 1.4  --  1.4 0.9   TP 6.3*  --  6.4 5.2*     Estimated Creatinine Clearance: 84.1 mL/min negative from bronch but given high dose steroids, will continue bactrim for PJP ppx  4. Tapering solumedrol  5. Home Plaquenil discontinued and cellcept started 2/22 with plan to increase to 1000 mg BID slowly  6. Continue home inhalers  7.  Pulm discussed

## 2021-03-04 NOTE — PLAN OF CARE
A/Ox4. VSS. Lung sounds diminished, on room air. NSR/ST on telemetry. SCDs on. Bowel sounds intact. Severe ABD pain, Pain Service on consult. Tolerating clear liquid diet. +Flatus. Voids in bathroom with standby assist. IVF infusing.   Will continue to [facility] and transportation as appropriate  - Identify discharge learning needs (meds, wound care, etc)  - Arrange for interpreters to assist at discharge as needed  - Consider post-discharge preferences of patient/family/discharge partner  - Complete POLST form as INTERVENTIONS:  - Monitor labs and assess for signs and symptoms of volume excess or deficit  - Monitor intake, output and patient weight  - Monitor urine specific gravity, serum osmolarity and serum sodium as indicated or ordered  - Monitor response to in

## 2021-03-04 NOTE — DIETARY NOTE
[address]     Admitting diagnosis:  Sarcoidosis [D86.9]  Hypokalemia [E87.6]  Chest pain of uncertain etiology [X34.8]    Ht: 172.7 cm (5' 8\")  Wt: 92.7 kg (204 lb 6.4 oz).    BMI: Body mass index is 31.08 kg/ at this time. Please consult if patient status changes or nutrition issues arise.     [facility]  Dietetic Intern

## 2021-03-04 NOTE — PLAN OF CARE
Patient alert and oriented x4. Vital signs stable, NSR/sinus tach on telemetry (HR to 120s with ambulation). On room air, . Lungs diminished bilaterally. Abdomen soft, distended, non-tender to touch, active bowel sounds, patient passing gas.  Tolerating signs/symptoms of CO2 retention  Outcome: Progressing     Problem: SAFETY ADULT - FALL  Goal: Free from fall injury  Description: INTERVENTIONS:  - Assess pt frequently for physical needs  - Identify cognitive and physical deficits and behaviors that affec routine/schedule  - Consider collaborating with pharmacy to review patient's medication profile  Outcome: Progressing  Goal: Maintains adequate nutritional intake (undernourished)  Description: INTERVENTIONS:  - Monitor percentage of each meal consumed  -

## 2021-03-04 NOTE — PHYSICAL THERAPY NOTE
PHYSICAL THERAPY EVALUATION - INPATIENT     Room Number: 1924/3892-Z  Evaluation Date: 3/4/2021  Type of Evaluation: Initial  Physician Order: PT Eval and Treat    Presenting Problem: Pleuritic Chest pain  Reason for Therapy: Mobility Dysfunction and MD at [facility] ENDOSCOPY   • COLONOSCOPY     • COLONOSCOPY  11/2015    4 small adeonomas, diverticulosis. repeat 2018   • EGD  11/2015     schatzki's ring, non obstructive. Erosive esophagitis, LA grad 1 (no barretts on path).  Gastritis, no hpylori on path   • E (including a wheelchair)?: None   -   Need to walk in hospital room?: None   -   Climbing 3-5 steps with a railing?: A Little       AM-PAC Score:  Raw Score: 23   Approx Degree of Impairment: 11.2%   Standardized Score (AM-PAC Scale): 56.93   CMS Modifier evaluation, the patient presents with the following impairments: pain in the chest limits his overall ability to tolerate in higher functional task and mobilities requiring supervision when amb s any use of an A.D for longer distances while in hosp for saf

## 2021-03-04 NOTE — PROGRESS NOTES
[facility] Pharmacy Note: Route Optimization for Pantoprazole (PROTONIX)    Patient is currently on Pantoprazole (PROTONIX) 40 mg IV every 12 hours.    The patient meets the criteria to convert to the oral equivalent as established by the IV to Oral conversion pro

## 2021-03-04 NOTE — PAYOR COMM NOTE
--------------  CONTINUED STAY REVIEW    Payor: JAD KLINE  Subscriber #:  [id number]  Authorization Number: [id number]    Admit date: 2/12/21  Admit time: 65    FAXING CLINICAL UPDATE FOR 3/4/21    3/4/21  SUBJECTIVE: Pt states pain is unchanged, did no sleeping  Acute renal insufficiency - possibly from bactrim  - improved off bactrim, now on prophy doses    MEDICATIONS ADMINISTERED IN LAST 1 DAY:  acetaminophen (OFIRMEV) infusion 1,000 mg     Date Action Dose Route User    3/4/2021 1102 New Bag 1,000 mg injection 80 mg     Date Action Dose Route User    3/4/2021 0851 Given 80 mg Intravenous Kermit Officer, RN    3/4/2021 0300 Given 80 mg Intravenous Rosales Brim, RN    3/3/2021 2040 Given 80 mg Intravenous Rosales Brim, RN    3/3/2021 1449 Given 8 Maria Del Carmen Armas, RN      traZODone HCl (DESYREL) tab 100 mg     Date Action Dose Route User    3/3/2021 2352 Given 100 mg Oral Gretchen Slaughter RN      fenofibrate micronized (LOFIBRA) cap 201 mg     Date Action Dose Route User    3/4/2021 0524 Given

## 2021-03-04 NOTE — PROGRESS NOTES
Pain Service  54year old male with hx sarcoidosis since 2000 admitted 2/10/21 with pleurtic chest pain and dyspnea, thought to be a flare of his sarcoidosis triggering pleurisy. Symptoms have not improved with high dose steroids.  Bronchoscopy cx negative RN       Addendum  Patient reports pain is better. He did fall asleep earlier and states he hasn't sleep well for weeks. Will hold prn OxyIR if patient is asleep.     Leif Hartman, RN

## 2021-03-04 NOTE — PLAN OF CARE
Assumed care of pt at 0730  A&Ox4, up with standby assist, continues to endorse 7-9/10 pain, receiving scheduled pain regimen per pain service with PRN fentanyl as needed  R/A, NSR, no cardiac-related chest pain, no shortness of breath  Skin is clean, dry, for needed discharge resources and transportation as appropriate  - Identify discharge learning needs (meds, wound care, etc)  - Arrange for interpreters to assist at discharge as needed  - Consider post-discharge preferences of patient/family/discharge pa maintained  Description: INTERVENTIONS:  - Monitor labs and assess for signs and symptoms of volume excess or deficit  - Monitor intake, output and patient weight  - Monitor urine specific gravity, serum osmolarity and serum sodium as indicated or ordered

## 2021-03-04 NOTE — PROGRESS NOTES
[facility]:  [facility]    1965 MRN [id number]   [facility] 8NE-A Attending Jenny Nguyễn DO   Hosp Day # 21 PCP Lemuel Burks MD     SUBJECTIVE: Pt states pain is unchanged, did not no (TUMS) chewable tab 500 mg, 500 mg, Oral, Daily PRN  •  Alum & Mag Hydroxide-Simeth (MAALOX) oral suspension 30 mL, 30 mL, Oral, QID PRN  •  amLODIPine Besylate (NORVASC) tab 10 mg, 10 mg, Oral, Daily  •  Senna-Docusate Sodium (SENOKOT S) 8.6-50 MG tab 2 t sarcoidosis flair triggering pleurisy vs atypical infectious process vs referred pain from abdominal process as noted on CT a/p. CT chest with no pulmonary embolus but did show ground glass opacities. Bronch done but no growth to date.  No PJP, flow negativ

## 2021-03-05 PROCEDURE — 99232 SBSQ HOSP IP/OBS MODERATE 35: CPT | Performed by: INTERNAL MEDICINE

## 2021-03-05 RX ORDER — OXYCODONE HCL 20 MG/1
20 TABLET, FILM COATED, EXTENDED RELEASE ORAL EVERY 12 HOURS SCHEDULED
Qty: 60 TABLET | Refills: 0 | Status: SHIPPED | OUTPATIENT
Start: 2021-03-05 | End: 2021-04-01

## 2021-03-05 RX ORDER — METHYLPREDNISOLONE SODIUM SUCCINATE 125 MG/2ML
80 INJECTION, POWDER, LYOPHILIZED, FOR SOLUTION INTRAMUSCULAR; INTRAVENOUS EVERY 8 HOURS
Status: DISCONTINUED | OUTPATIENT
Start: 2021-03-05 | End: 2021-03-08

## 2021-03-05 RX ORDER — OXYCODONE HYDROCHLORIDE 20 MG/1
20 TABLET ORAL
Qty: 60 TABLET | Refills: 0 | Status: SHIPPED | OUTPATIENT
Start: 2021-03-05 | End: 2021-04-01

## 2021-03-05 NOTE — PROGRESS NOTES
Pain Service  54year old male with hx sarcoidosis since 2000 admitted 2/10/21 with pleurtic chest pain and dyspnea, thought to be a flare of his sarcoidosis triggering pleurisy. Symptoms have not improved with high dose steroids.    He complained of nausea

## 2021-03-05 NOTE — CM/SW NOTE
[facility] [facility] by dr walsh--faxed to  as directed with supporting clinicals and marked for urgent review.   Informed by [facility] that outcome of determination should be known within 24-hours  Ref # [id number]

## 2021-03-05 NOTE — CM/SW NOTE
Requested to work on PA for immediate and extended release of oxy for patient. Call placed to patient's insurance pharmacy [facility]  (pt ID [id number]).   Await PA paperwork to complete with supporting clinical information to be [facility]

## 2021-03-05 NOTE — PLAN OF CARE
Patient alert and oriented x4. Vital signs stable, NSR on telemetry, on room air with continuous pulse oximetry. Abdomen distended, non-tender to touch. Patient reports pain in upper abdomen/chest, verbalizes understanding of plan for pain medications.  Ice INTERVENTIONS:  - Identify barriers to discharge w/pt and caregiver  - Include patient/family/discharge partner in discharge planning  - Arrange for needed discharge resources and transportation as appropriate  - Identify discharge learning needs (meds, wo

## 2021-03-05 NOTE — PROGRESS NOTES
[address] [facility] Gastroenterology     Bobbi Comp Patient Status:  Inpatient    1965 MRN [id number]   [facility] 8NE-A Attending Pablo Amaya, 160[address] [facility] Day # 21 PCP April Toussaint MD respiratory distress  Abd: Soft, mildly upper band-tender, non-distended. No rebound tenderness, no guarding. +BS  Skin:  Jaundice absent. Neuro: Aox3.      Diagnostic Data:      Labs:  Recent Labs   Lab 02/27/21  0506 02/28/21  0445 03/02/21  0500 03/03/2 admitted with atypical chest pain, and imaging concerning for a pancreatic head mass. Impression:   -MR showed inflammatory changes; mesenteritic edema. Spiculated region, possible pancreatic mass.  Ddx pancreatic head adenoCa, necrotic mass, vs carcino

## 2021-03-05 NOTE — PROGRESS NOTES
[address] [facility]:  [facility]    1965 MRN [id number]   [facility] 8NE-A Attending Keyana Myles DO   Hosp Day # 21 PCP Maris Ye MD     SUBJECTIVE:  No significant change in symptoms sinc mg, Oral, Daily PRN  •  Alum & Mag Hydroxide-Simeth (MAALOX) oral suspension 30 mL, 30 mL, Oral, QID PRN  •  amLODIPine Besylate (NORVASC) tab 10 mg, 10 mg, Oral, Daily  •  Senna-Docusate Sodium (SENOKOT S) 8.6-50 MG tab 2 tablet, 2 tablet, Oral, Daily PRN independently visualized all relevant chest imaging in PACS. I agree with the radiology interpretation except where noted. ASSESSMENT/PLAN:  1.  Atypical chest pain- could be sarcoidosis flair triggering pleurisy vs atypical infectious process vs ref

## 2021-03-05 NOTE — PLAN OF CARE
Assumed care of pt at 0730  A&Ox4, up with standby assist, pain seems to be better controlled today with scheduled pain regimen, pt rates pain 5-8/10 with a self-stated goal of 5/10  R/A, , NSR, no chest pain, no shortness of breath, pt endorses dyspnea with appropriate resources  Description: INTERVENTIONS:  - Identify barriers to discharge w/pt and caregiver  - Include patient/family/discharge partner in discharge planning  - Arrange for needed discharge resources and transportation as appropriate  - Id appropriate  - Fluid restriction as ordered  - Instruct patient on fluid and nutrition restrictions as appropriate  Outcome: Progressing  Goal: Hemodynamic stability and optimal renal function maintained  Description: INTERVENTIONS:  - Monitor labs and ass

## 2021-03-05 NOTE — CM/SW NOTE
Called [facility]--PA approved for both oxycontin and oxycodone--cost quoted for both as $0.oo--page to dr Renetta Polanco to update    Called pt's [facility] pharmacy --per pharmacist cost of oxycontin $30.oo and oxycodone $6.23--nurse updated

## 2021-03-05 NOTE — PROGRESS NOTES
[facility]  Progress Note     Tricia Farley Patient Status:  Observation    1965 MRN [id number]   [facility] 8NE-A Attending Laila Fatima, DO   Hosp Day # 21 PCP Dolores Pickett MD     Chief Complaint: Chest pain, SOB    S (based on SCr of 0.77 mg/dL). No results for input(s): PTP, INR in the last 168 hours. No results for input(s): TROP, CK in the last 168 hours. Imaging: Imaging data reviewed in Epic.     Medications:   • oxyCODONE HCl  20 mg Oral Q4H [address]   • oxyCOD clinic, patient will need follow up there  2. Pancreatic mass with surrounding small bowel inflammation, concerning walled off perforation or neoplasm given elevated CA 19-9. MRI/MRCP noted  1. Stop IVF, ADAT  2. continue bentyl   3.  Started Zosyn on 2/28,

## 2021-03-05 NOTE — PLAN OF CARE
Assumed care of patient 1930 resting in bed. AO x4. NSR on tele monitor. O2 sat adequate on room air. C/O ongoing chest pain, denies shortness of breath. Bed locked in lowest position, with bed alarm on. Call light and personal items in reach.  Will continu

## 2021-03-05 NOTE — PROGRESS NOTES
[facility]  Report of Psychiatric Progress Note    Edna Del Toro Patient Status:  Inpatient    1965 MRN [id number]   [facility] 8NE-A Attending Luda Reese, DO   Hosp Day # 21 PCP Levy Madrigal MD     Date of Admission:  Xanax 0.25mg dose did not help with anxiety or sleep. He feels anxious when he has dyspnea and chest pain. He has depressed mood, but no hopelessness or suicidal ideation. He c/o INSOMNIA, both falling and staying asleep.      3/3/21- He feels less anxious Performed by Delfina Peterson MD at [facility] ENDOSCOPY   • COLONOSCOPY     • COLONOSCOPY  11/2015    4 small adeonomas, diverticulosis. repeat 2018   • EGD  11/2015     schatzki's ring, non obstructive.  Erosive esophagitis, LA grad 1 (no barretts on path 1 tablet, 1 tablet, Oral, Three Times Weekly  •  mycophenolate mofetil (CELLCEPT) cap 750 mg, 750 mg, Oral, BID AC  •  Piperacillin Sod-Tazobactam So (ZOSYN) 3.375 g in dextrose 5% 100 mL IVPB-ADDV, 3.375 g, Intravenous, Q8H  •  scopolamine (TRANSDERM-SCOP logical  Thought content: no delusions  Perceptions: no hallucinations  Associations: Intact    Orientation: oriented person, place and oriented situation  Attention and Concentration:   fair  Memory:  intact recent and intact remote  Language: did not elmer

## 2021-03-06 ENCOUNTER — ANESTHESIA EVENT (OUTPATIENT)
Dept: ENDOSCOPY | Facility: HOSPITAL | Age: 56
DRG: 981 | End: 2021-03-06
Payer: COMMERCIAL

## 2021-03-06 ENCOUNTER — APPOINTMENT (OUTPATIENT)
Dept: GENERAL RADIOLOGY | Facility: HOSPITAL | Age: 56
DRG: 981 | End: 2021-03-06
Attending: INTERNAL MEDICINE
Payer: COMMERCIAL

## 2021-03-06 ENCOUNTER — ANESTHESIA (OUTPATIENT)
Dept: ENDOSCOPY | Facility: HOSPITAL | Age: 56
DRG: 981 | End: 2021-03-06
Payer: COMMERCIAL

## 2021-03-06 PROCEDURE — 0D9L8ZZ DRAINAGE OF TRANSVERSE COLON, VIA NATURAL OR ARTIFICIAL OPENING ENDOSCOPIC: ICD-10-PCS | Performed by: STUDENT IN AN ORGANIZED HEALTH CARE EDUCATION/TRAINING PROGRAM

## 2021-03-06 PROCEDURE — 74018 RADEX ABDOMEN 1 VIEW: CPT | Performed by: INTERNAL MEDICINE

## 2021-03-06 PROCEDURE — 99223 1ST HOSP IP/OBS HIGH 75: CPT | Performed by: COLON & RECTAL SURGERY

## 2021-03-06 PROCEDURE — 99232 SBSQ HOSP IP/OBS MODERATE 35: CPT | Performed by: INTERNAL MEDICINE

## 2021-03-06 RX ORDER — MAGNESIUM CARB/ALUMINUM HYDROX 105-160MG
296 TABLET,CHEWABLE ORAL ONCE AS NEEDED
Status: ACTIVE | OUTPATIENT
Start: 2021-03-06 | End: 2021-03-06

## 2021-03-06 RX ORDER — SODIUM CHLORIDE, SODIUM LACTATE, POTASSIUM CHLORIDE, CALCIUM CHLORIDE 600; 310; 30; 20 MG/100ML; MG/100ML; MG/100ML; MG/100ML
INJECTION, SOLUTION INTRAVENOUS CONTINUOUS
Status: DISCONTINUED | OUTPATIENT
Start: 2021-03-06 | End: 2021-03-06 | Stop reason: HOSPADM

## 2021-03-06 RX ORDER — HYDROCODONE BITARTRATE AND ACETAMINOPHEN 5; 325 MG/1; MG/1
2 TABLET ORAL AS NEEDED
Status: DISCONTINUED | OUTPATIENT
Start: 2021-03-06 | End: 2021-03-06 | Stop reason: HOSPADM

## 2021-03-06 RX ORDER — LIDOCAINE HYDROCHLORIDE 10 MG/ML
INJECTION, SOLUTION EPIDURAL; INFILTRATION; INTRACAUDAL; PERINEURAL AS NEEDED
Status: DISCONTINUED | OUTPATIENT
Start: 2021-03-06 | End: 2021-03-06 | Stop reason: SURG

## 2021-03-06 RX ORDER — PANTOPRAZOLE SODIUM 40 MG/1
40 TABLET, DELAYED RELEASE ORAL
Status: DISCONTINUED | OUTPATIENT
Start: 2021-03-07 | End: 2021-03-07

## 2021-03-06 RX ORDER — OXYCODONE HYDROCHLORIDE 10 MG/1
10 TABLET ORAL EVERY 6 HOURS PRN
Status: DISCONTINUED | OUTPATIENT
Start: 2021-03-06 | End: 2021-03-08

## 2021-03-06 RX ORDER — SODIUM CHLORIDE 9 MG/ML
INJECTION, SOLUTION INTRAVENOUS CONTINUOUS PRN
Status: DISCONTINUED | OUTPATIENT
Start: 2021-03-06 | End: 2021-03-06 | Stop reason: SURG

## 2021-03-06 RX ORDER — METRONIDAZOLE 500 MG/100ML
500 INJECTION, SOLUTION INTRAVENOUS EVERY 8 HOURS
Status: DISCONTINUED | OUTPATIENT
Start: 2021-03-06 | End: 2021-03-21

## 2021-03-06 RX ORDER — NALOXONE HYDROCHLORIDE 0.4 MG/ML
80 INJECTION, SOLUTION INTRAMUSCULAR; INTRAVENOUS; SUBCUTANEOUS AS NEEDED
Status: DISCONTINUED | OUTPATIENT
Start: 2021-03-06 | End: 2021-03-06 | Stop reason: HOSPADM

## 2021-03-06 RX ORDER — MIDAZOLAM HYDROCHLORIDE 1 MG/ML
1 INJECTION INTRAMUSCULAR; INTRAVENOUS EVERY 5 MIN PRN
Status: DISCONTINUED | OUTPATIENT
Start: 2021-03-06 | End: 2021-03-06 | Stop reason: HOSPADM

## 2021-03-06 RX ORDER — SODIUM PHOSPHATE, DIBASIC AND SODIUM PHOSPHATE, MONOBASIC 7; 19 G/133ML; G/133ML
1 ENEMA RECTAL ONCE AS NEEDED
Status: COMPLETED | OUTPATIENT
Start: 2021-03-06 | End: 2021-03-06

## 2021-03-06 RX ORDER — DEXAMETHASONE SODIUM PHOSPHATE 4 MG/ML
VIAL (ML) INJECTION AS NEEDED
Status: DISCONTINUED | OUTPATIENT
Start: 2021-03-06 | End: 2021-03-06 | Stop reason: SURG

## 2021-03-06 RX ORDER — HYDROMORPHONE HYDROCHLORIDE 1 MG/ML
0.4 INJECTION, SOLUTION INTRAMUSCULAR; INTRAVENOUS; SUBCUTANEOUS EVERY 5 MIN PRN
Status: DISCONTINUED | OUTPATIENT
Start: 2021-03-06 | End: 2021-03-06 | Stop reason: HOSPADM

## 2021-03-06 RX ORDER — OXYCODONE HCL 10 MG/1
10 TABLET, FILM COATED, EXTENDED RELEASE ORAL EVERY 12 HOURS PRN
Status: DISCONTINUED | OUTPATIENT
Start: 2021-03-06 | End: 2021-03-08

## 2021-03-06 RX ORDER — METOCLOPRAMIDE HYDROCHLORIDE 5 MG/ML
10 INJECTION INTRAMUSCULAR; INTRAVENOUS AS NEEDED
Status: DISCONTINUED | OUTPATIENT
Start: 2021-03-06 | End: 2021-03-06 | Stop reason: HOSPADM

## 2021-03-06 RX ORDER — HYDROCODONE BITARTRATE AND ACETAMINOPHEN 5; 325 MG/1; MG/1
1 TABLET ORAL AS NEEDED
Status: DISCONTINUED | OUTPATIENT
Start: 2021-03-06 | End: 2021-03-06 | Stop reason: HOSPADM

## 2021-03-06 RX ORDER — ONDANSETRON 2 MG/ML
4 INJECTION INTRAMUSCULAR; INTRAVENOUS AS NEEDED
Status: DISCONTINUED | OUTPATIENT
Start: 2021-03-06 | End: 2021-03-06 | Stop reason: HOSPADM

## 2021-03-06 RX ADMIN — DEXAMETHASONE SODIUM PHOSPHATE 4 MG: 4 MG/ML VIAL (ML) INJECTION at 16:58:00

## 2021-03-06 RX ADMIN — LIDOCAINE HYDROCHLORIDE 50 MG: 10 INJECTION, SOLUTION EPIDURAL; INFILTRATION; INTRACAUDAL; PERINEURAL at 16:52:00

## 2021-03-06 RX ADMIN — SODIUM CHLORIDE: 9 INJECTION, SOLUTION INTRAVENOUS at 17:38:00

## 2021-03-06 RX ADMIN — ONDANSETRON 4 MG: 2 INJECTION INTRAMUSCULAR; INTRAVENOUS at 16:58:00

## 2021-03-06 RX ADMIN — SODIUM CHLORIDE: 9 INJECTION, SOLUTION INTRAVENOUS at 16:47:00

## 2021-03-06 NOTE — ANESTHESIA PREPROCEDURE EVALUATION
PRE-OP EVALUATION    Patient Name: Bobbi Ramirez    Pre-op Diagnosis: colonic pseudo obstruction    Procedure(s):   Unprepped flexible sigmoidoscopy with colonic decompression    Surgeon(s) and Role:     * Denisse Morejon MD - Primary    Pre-op vital Transdermal, Q72H  [COMPLETED] sodium chloride 0.9% IV bolus 500 mL, 500 mL, Intravenous, Once  Metoclopramide HCl (REGLAN) injection 10 mg, 10 mg, Intravenous, Q6H PRN  [COMPLETED] Prochlorperazine Edisylate (COMPAZINE) injection 5 mg, 5 mg, Intravenous, Once  [] 0.9% NaCl infusion, , Intravenous, Continuous  [COMPLETED] HYDROmorphone HCl (DILAUDID) 1 MG/ML injection 0.5 mg, 0.5 mg, Intravenous, Once  fenofibrate micronized (LOFIBRA) cap 201 mg, 201 mg, Oral, QAM AC  Fluticasone Furoate-Vilanterol ( GI/Hepatic/Renal      (+) GERD          (+) liver disease                 Cardiovascular                  (+) hypertension                                     Endo/Other                           (+) arthritis       Pulmonary  Comment: Smoker Component Value Date     03/05/2021    K 4.4 03/05/2021     03/05/2021    CO2 26.0 03/05/2021    BUN 21 (H) 03/05/2021    CREATSERUM 0.77 03/05/2021     (H) 03/05/2021    CA 7.7 (L) 03/05/2021     Lab Results   Component Value Date

## 2021-03-06 NOTE — CONSULTS
[facility]  Report of  Surgical Consultation with History and Physical Exam    Sienna Jimenez Patient Status:  Inpatient    1965 MRN [id number]   [facility] [place] 8NE-A Attending Chacho Blake Day # 25 PCP Ryanne Martin a small amount of mesenteric edema associated with this that has fluid that extends towards the right pericolic gutter. Spiculated region just caudal to the pancreatic head that is enlarged with a possible mass is identified.   The spiculated region does n cholesterol    • Hyperlipidemia    • Loss of appetite    • Night sweats    • Obstructive apnea 07/26/2017    AHI 24 RDI 26 SaO2 clay 82 % CPAP 13 Premier   • Pain in joints    • Sarcoidosis    • Shortness of breath     no oxygen   • Sleep apnea    • Weigh oxyCODONE HCl ER (OXYCONTIN) 12 hr tab 20 mg, 20 mg, Oral, Q12H  •  acetaminophen (TYLENOL EXTRA STRENGTH) tab 1,000 mg, 1,000 mg, Oral, TID & HS  •  Pantoprazole Sodium (PROTONIX) EC tab 40 mg, 40 mg, Oral, BID AC  •  Dicyclomine HCl (BENTYL) cap 10 mg, 1 400 MG/5ML suspension 30 mL, 30 mL, Oral, Daily PRN  •  bisacodyl (DULCOLAX) rectal suppository 10 mg, 10 mg, Rectal, Daily PRN    Review of Systems:    Allergic/Immuno:  Review of patient's allergies performed.   Cardiovascular:  Negative for cool extremit upper abdomen. No rebound or guarding. No peritoneal signs. Extremities:  No lower extremity edema noted. Without clubbing or cyanosis. Skin: Normal texture and turgor.       Laboratory Data and Relevant X-rays:  Recent Labs   Lab 02/28/21  7649 direction is noted.  This may be a result the pancreatitis. Tamica Rodes is a mild amount of mesenteric edema associated with this that has fluid that extends   towards the right pericolic gutter.  However, spiculated region just caudal to the pancreatic head th for further details.         Critical results were discussed with Cassi GUTIERREZ at 1717 hours on 2/28/2021.  Critical results were read back.               Dictated by (CST): Suzie Stover MD on 2/28/2021 at 4:53 PM       Finalized by (CST): Suzie Stover, dysfunction, unspecified erectile dysfunction type     Chest pain     Dyspnea, unspecified type     Pain of right hip joint     Leg swelling     Right low back pain     Diverticulosis of large intestine without perforation or abscess without bleeding     S reviewed the above note and evaluation by the physician assistant. I agree with her physical exam and the data listed in the report, and I have made any relevant changes in editing her note.  I have personally examined the patient and reviewed all relevant given the severity of the situation  -We will check lab work and plain films in the morning  -We will keep a close eye on his abdomen. Hope to avoid any surgical intervention. Generally this does require total abdominal colectomy with end ileostomy.   Any

## 2021-03-06 NOTE — PROGRESS NOTES
Pulmonary Progress Note      NAME: Tami Menezes - ROOM: 2799/0491-V - MRN: [id number] - Age: 54year old - : 1965    Assessment/Plan:  1.  Atypical chest pain-   - Likely related to pancreatic mass/infection wit hrefered pain  - Sarcoid possible 3/5/2021 1558  Gross per 24 hour   Intake 0 ml   Output 0 ml   Net 0 ml     Physical Exam:  /63 (BP Location: Right arm)   Pulse 99   Temp 98.6 °F (37 °C) (Oral)   Resp 20   Ht 5' 8\" (1.727 m)   Wt 204 lb 6.4 oz (92.7 kg)   SpO2 98%   BMI 31.08 kg/m

## 2021-03-06 NOTE — PLAN OF CARE
Assumed care of patient around [address]. Alert and oriented x4. Denies any chest pain, lightheadedness or shortness of breath. On room air. NSR on tele. Voids. Continent of bowel and bladder. Up ad marine.  Pt c/o dizziness when trying to stand for a walk before b Monitor labs and rhythm and assess patient for signs and symptoms of electrolyte imbalances  - Administer electrolyte replacement as ordered  - Monitor response to electrolyte replacements, including rhythm and repeat lab results as appropriate  - Fluid re (meds, wound care, etc)  - Arrange for interpreters to assist at discharge as needed  - Consider post-discharge preferences of patient/family/discharge partner  - Complete POLST form as appropriate  - Assess patient's ability to be responsible for managing

## 2021-03-06 NOTE — OPERATIVE REPORT
Flexible Sigmoidoscopy Operative Report  Patient Name: Santa Roper ([birth year])  Procedure: Sigmoidoscopy with stool aspirate and decompression. Indication: obstruction vs pseudo-obstruction on imaging  Attending: Silas Jalloh M.D.   Consent: The risk the abrupt onset of symptoms. Recommendations:   1. Start empiric antibiotics for C diff (oral vanco and IV Flagyl) while await stool results  2. Minimize opiates  3. NPO  4. Re-assess in am  5.  If further decompensation, will have to discuss surgical man

## 2021-03-06 NOTE — ANESTHESIA POSTPROCEDURE EVALUATION
[id number] [facility] Patient Status:  Inpatient   Age/Gender 54year old male MRN [id number]   Location [address] [facility] Attending Alia Downing, [address] [facility] Day # 25 PCP April Toussaint MD       Anesthesia Post-

## 2021-03-06 NOTE — ANESTHESIA PROCEDURE NOTES
Airway  Urgency: elective      General Information and Staff    Patient location during procedure: OR  Anesthesiologist: Jess Akins MD  Performed: anesthesiologist     Indications and Patient Condition  Indications for airway management: anesthes

## 2021-03-06 NOTE — PROGRESS NOTES
[facility]  Progress Note     [facility] Patient Status:  Observation    1965 MRN [id number]   [facility] 8NE-A Attending Jorge Garcia, [address] [facility] Day # 25 PCP Maris Ye MD     Chief Complaint: Chest pain, SOB    S 28.0 26.0   ALKPHO 58 45  --    AST 45* 18  --    ALT 38 31  --    BILT 1.4 0.9  --    TP 6.4 5.2*  --      Estimated Creatinine Clearance: 104.9 mL/min (based on SCr of 0.77 mg/dL). No results for input(s): PTP, INR in the last 168 hours.   No results f hypoxia  6. Home Plaquenil discontinued and cellcept started 2/22 with plan to increase to 1000 mg BID slowly  7. Continue home inhalers  8. Pulm discussed with [address] [place] [address], patient will need follow up there  2.  Pancreatic mass with surrounding small bowel

## 2021-03-07 ENCOUNTER — APPOINTMENT (OUTPATIENT)
Dept: CT IMAGING | Facility: HOSPITAL | Age: 56
DRG: 981 | End: 2021-03-07
Attending: STUDENT IN AN ORGANIZED HEALTH CARE EDUCATION/TRAINING PROGRAM
Payer: COMMERCIAL

## 2021-03-07 ENCOUNTER — APPOINTMENT (OUTPATIENT)
Dept: GENERAL RADIOLOGY | Facility: HOSPITAL | Age: 56
DRG: 981 | End: 2021-03-07
Attending: ANESTHESIOLOGY
Payer: COMMERCIAL

## 2021-03-07 ENCOUNTER — APPOINTMENT (OUTPATIENT)
Dept: GENERAL RADIOLOGY | Facility: HOSPITAL | Age: 56
DRG: 981 | End: 2021-03-07
Attending: COLON & RECTAL SURGERY
Payer: COMMERCIAL

## 2021-03-07 ENCOUNTER — ANESTHESIA (OUTPATIENT)
Dept: SURGERY | Facility: HOSPITAL | Age: 56
DRG: 981 | End: 2021-03-07
Payer: COMMERCIAL

## 2021-03-07 ENCOUNTER — ANESTHESIA EVENT (OUTPATIENT)
Dept: SURGERY | Facility: HOSPITAL | Age: 56
DRG: 981 | End: 2021-03-07
Payer: COMMERCIAL

## 2021-03-07 PROCEDURE — 99233 SBSQ HOSP IP/OBS HIGH 50: CPT | Performed by: INTERNAL MEDICINE

## 2021-03-07 PROCEDURE — 76942 ECHO GUIDE FOR BIOPSY: CPT | Performed by: ANESTHESIOLOGY

## 2021-03-07 PROCEDURE — 0D1B0Z4 BYPASS ILEUM TO CUTANEOUS, OPEN APPROACH: ICD-10-PCS | Performed by: COLON & RECTAL SURGERY

## 2021-03-07 PROCEDURE — 36430 TRANSFUSION BLD/BLD COMPNT: CPT | Performed by: ANESTHESIOLOGY

## 2021-03-07 PROCEDURE — 30233L1 TRANSFUSION OF NONAUTOLOGOUS FRESH PLASMA INTO PERIPHERAL VEIN, PERCUTANEOUS APPROACH: ICD-10-PCS | Performed by: STUDENT IN AN ORGANIZED HEALTH CARE EDUCATION/TRAINING PROGRAM

## 2021-03-07 PROCEDURE — 3074F SYST BP LT 130 MM HG: CPT | Performed by: COLON & RECTAL SURGERY

## 2021-03-07 PROCEDURE — 0DTE0ZZ RESECTION OF LARGE INTESTINE, OPEN APPROACH: ICD-10-PCS | Performed by: COLON & RECTAL SURGERY

## 2021-03-07 PROCEDURE — 02HV33Z INSERTION OF INFUSION DEVICE INTO SUPERIOR VENA CAVA, PERCUTANEOUS APPROACH: ICD-10-PCS | Performed by: STUDENT IN AN ORGANIZED HEALTH CARE EDUCATION/TRAINING PROGRAM

## 2021-03-07 PROCEDURE — 3008F BODY MASS INDEX DOCD: CPT | Performed by: COLON & RECTAL SURGERY

## 2021-03-07 PROCEDURE — 99233 SBSQ HOSP IP/OBS HIGH 50: CPT | Performed by: COLON & RECTAL SURGERY

## 2021-03-07 PROCEDURE — 74176 CT ABD & PELVIS W/O CONTRAST: CPT | Performed by: STUDENT IN AN ORGANIZED HEALTH CARE EDUCATION/TRAINING PROGRAM

## 2021-03-07 PROCEDURE — 30233N1 TRANSFUSION OF NONAUTOLOGOUS RED BLOOD CELLS INTO PERIPHERAL VEIN, PERCUTANEOUS APPROACH: ICD-10-PCS | Performed by: STUDENT IN AN ORGANIZED HEALTH CARE EDUCATION/TRAINING PROGRAM

## 2021-03-07 PROCEDURE — 71045 X-RAY EXAM CHEST 1 VIEW: CPT | Performed by: ANESTHESIOLOGY

## 2021-03-07 PROCEDURE — 3078F DIAST BP <80 MM HG: CPT | Performed by: COLON & RECTAL SURGERY

## 2021-03-07 DEVICE — KIT TISS CLOS TISSEEL 4ML: Type: IMPLANTABLE DEVICE | Site: ABDOMEN | Status: FUNCTIONAL

## 2021-03-07 RX ORDER — MAGNESIUM SULFATE HEPTAHYDRATE 500 MG/ML
INJECTION, SOLUTION INTRAMUSCULAR; INTRAVENOUS AS NEEDED
Status: DISCONTINUED | OUTPATIENT
Start: 2021-03-07 | End: 2021-03-07 | Stop reason: SURG

## 2021-03-07 RX ORDER — SODIUM CHLORIDE 9 MG/ML
INJECTION, SOLUTION INTRAVENOUS CONTINUOUS PRN
Status: DISCONTINUED | OUTPATIENT
Start: 2021-03-07 | End: 2021-03-07 | Stop reason: SURG

## 2021-03-07 RX ORDER — NALOXONE HYDROCHLORIDE 0.4 MG/ML
80 INJECTION, SOLUTION INTRAMUSCULAR; INTRAVENOUS; SUBCUTANEOUS AS NEEDED
Status: DISCONTINUED | OUTPATIENT
Start: 2021-03-07 | End: 2021-03-07

## 2021-03-07 RX ORDER — MIDAZOLAM HYDROCHLORIDE 1 MG/ML
1 INJECTION INTRAMUSCULAR; INTRAVENOUS EVERY 5 MIN PRN
Status: ACTIVE | OUTPATIENT
Start: 2021-03-07 | End: 2021-03-07

## 2021-03-07 RX ORDER — ROCURONIUM BROMIDE 10 MG/ML
INJECTION, SOLUTION INTRAVENOUS AS NEEDED
Status: DISCONTINUED | OUTPATIENT
Start: 2021-03-07 | End: 2021-03-07 | Stop reason: SURG

## 2021-03-07 RX ORDER — ALBUMIN, HUMAN INJ 5% 5 %
250 SOLUTION INTRAVENOUS ONCE
Status: COMPLETED | OUTPATIENT
Start: 2021-03-07 | End: 2021-03-07

## 2021-03-07 RX ORDER — HYDROCODONE BITARTRATE AND ACETAMINOPHEN 5; 325 MG/1; MG/1
1 TABLET ORAL AS NEEDED
Status: DISCONTINUED | OUTPATIENT
Start: 2021-03-07 | End: 2021-03-08

## 2021-03-07 RX ORDER — HYDROCODONE BITARTRATE AND ACETAMINOPHEN 5; 325 MG/1; MG/1
2 TABLET ORAL AS NEEDED
Status: DISCONTINUED | OUTPATIENT
Start: 2021-03-07 | End: 2021-03-08

## 2021-03-07 RX ORDER — HYDROMORPHONE HYDROCHLORIDE 1 MG/ML
0.4 INJECTION, SOLUTION INTRAMUSCULAR; INTRAVENOUS; SUBCUTANEOUS EVERY 2 HOUR PRN
Status: DISCONTINUED | OUTPATIENT
Start: 2021-03-07 | End: 2021-03-08

## 2021-03-07 RX ORDER — CEFOXITIN 2 G/1
INJECTION, POWDER, FOR SOLUTION INTRAVENOUS AS NEEDED
Status: DISCONTINUED | OUTPATIENT
Start: 2021-03-07 | End: 2021-03-07 | Stop reason: SURG

## 2021-03-07 RX ORDER — ONDANSETRON 2 MG/ML
INJECTION INTRAMUSCULAR; INTRAVENOUS AS NEEDED
Status: DISCONTINUED | OUTPATIENT
Start: 2021-03-07 | End: 2021-03-07 | Stop reason: SURG

## 2021-03-07 RX ORDER — ACETAMINOPHEN 650 MG/1
650 SUPPOSITORY RECTAL EVERY 6 HOURS PRN
Status: DISCONTINUED | OUTPATIENT
Start: 2021-03-07 | End: 2021-03-07

## 2021-03-07 RX ORDER — SODIUM PHOSPHATE, DIBASIC AND SODIUM PHOSPHATE, MONOBASIC 7; 19 G/133ML; G/133ML
1 ENEMA RECTAL ONCE AS NEEDED
Status: DISCONTINUED | OUTPATIENT
Start: 2021-03-07 | End: 2021-04-01

## 2021-03-07 RX ORDER — POLYETHYLENE GLYCOL 3350 17 G/17G
17 POWDER, FOR SOLUTION ORAL DAILY PRN
Status: DISCONTINUED | OUTPATIENT
Start: 2021-03-07 | End: 2021-04-01

## 2021-03-07 RX ORDER — METOCLOPRAMIDE HYDROCHLORIDE 5 MG/ML
10 INJECTION INTRAMUSCULAR; INTRAVENOUS AS NEEDED
Status: ACTIVE | OUTPATIENT
Start: 2021-03-07 | End: 2021-03-07

## 2021-03-07 RX ORDER — NALOXONE HYDROCHLORIDE 0.4 MG/ML
80 INJECTION, SOLUTION INTRAMUSCULAR; INTRAVENOUS; SUBCUTANEOUS AS NEEDED
Status: ACTIVE | OUTPATIENT
Start: 2021-03-07 | End: 2021-03-07

## 2021-03-07 RX ORDER — HYDROMORPHONE HYDROCHLORIDE 1 MG/ML
0.8 INJECTION, SOLUTION INTRAMUSCULAR; INTRAVENOUS; SUBCUTANEOUS EVERY 2 HOUR PRN
Status: DISCONTINUED | OUTPATIENT
Start: 2021-03-07 | End: 2021-03-08

## 2021-03-07 RX ORDER — ONDANSETRON 2 MG/ML
4 INJECTION INTRAMUSCULAR; INTRAVENOUS AS NEEDED
Status: ACTIVE | OUTPATIENT
Start: 2021-03-07 | End: 2021-03-07

## 2021-03-07 RX ORDER — HYDROMORPHONE HYDROCHLORIDE 1 MG/ML
0.4 INJECTION, SOLUTION INTRAMUSCULAR; INTRAVENOUS; SUBCUTANEOUS EVERY 5 MIN PRN
Status: DISCONTINUED | OUTPATIENT
Start: 2021-03-07 | End: 2021-03-07

## 2021-03-07 RX ORDER — MEPERIDINE HYDROCHLORIDE 25 MG/ML
12.5 INJECTION INTRAMUSCULAR; INTRAVENOUS; SUBCUTANEOUS AS NEEDED
Status: DISCONTINUED | OUTPATIENT
Start: 2021-03-07 | End: 2021-03-08

## 2021-03-07 RX ORDER — ONDANSETRON 2 MG/ML
4 INJECTION INTRAMUSCULAR; INTRAVENOUS EVERY 6 HOURS PRN
Status: DISCONTINUED | OUTPATIENT
Start: 2021-03-07 | End: 2021-03-08

## 2021-03-07 RX ORDER — LABETALOL HYDROCHLORIDE 5 MG/ML
5 INJECTION, SOLUTION INTRAVENOUS EVERY 5 MIN PRN
Status: ACTIVE | OUTPATIENT
Start: 2021-03-07 | End: 2021-03-07

## 2021-03-07 RX ORDER — ACETAMINOPHEN 325 MG/1
650 TABLET ORAL EVERY 6 HOURS PRN
Status: DISCONTINUED | OUTPATIENT
Start: 2021-03-07 | End: 2021-03-07

## 2021-03-07 RX ORDER — ACETAMINOPHEN 10 MG/ML
1000 INJECTION, SOLUTION INTRAVENOUS EVERY 6 HOURS
Status: DISCONTINUED | OUTPATIENT
Start: 2021-03-07 | End: 2021-03-23

## 2021-03-07 RX ORDER — BISACODYL 10 MG
10 SUPPOSITORY, RECTAL RECTAL
Status: DISCONTINUED | OUTPATIENT
Start: 2021-03-07 | End: 2021-04-01

## 2021-03-07 RX ORDER — SODIUM CHLORIDE, SODIUM LACTATE, POTASSIUM CHLORIDE, CALCIUM CHLORIDE 600; 310; 30; 20 MG/100ML; MG/100ML; MG/100ML; MG/100ML
INJECTION, SOLUTION INTRAVENOUS CONTINUOUS
Status: DISCONTINUED | OUTPATIENT
Start: 2021-03-07 | End: 2021-03-07

## 2021-03-07 RX ORDER — SODIUM CHLORIDE, SODIUM LACTATE, POTASSIUM CHLORIDE, CALCIUM CHLORIDE 600; 310; 30; 20 MG/100ML; MG/100ML; MG/100ML; MG/100ML
INJECTION, SOLUTION INTRAVENOUS CONTINUOUS
Status: DISCONTINUED | OUTPATIENT
Start: 2021-03-07 | End: 2021-03-10

## 2021-03-07 RX ORDER — HYDROMORPHONE HYDROCHLORIDE 1 MG/ML
0.2 INJECTION, SOLUTION INTRAMUSCULAR; INTRAVENOUS; SUBCUTANEOUS EVERY 2 HOUR PRN
Status: DISCONTINUED | OUTPATIENT
Start: 2021-03-07 | End: 2021-03-08

## 2021-03-07 RX ORDER — CHLORHEXIDINE GLUCONATE 0.12 MG/ML
15 RINSE ORAL
Status: DISCONTINUED | OUTPATIENT
Start: 2021-03-07 | End: 2021-03-07

## 2021-03-07 RX ORDER — ACETAMINOPHEN 160 MG/5ML
650 SOLUTION ORAL EVERY 6 HOURS PRN
Status: DISCONTINUED | OUTPATIENT
Start: 2021-03-07 | End: 2021-03-07

## 2021-03-07 RX ORDER — EPHEDRINE SULFATE 50 MG/ML
INJECTION INTRAVENOUS AS NEEDED
Status: DISCONTINUED | OUTPATIENT
Start: 2021-03-07 | End: 2021-03-07 | Stop reason: SURG

## 2021-03-07 RX ORDER — SODIUM CHLORIDE 9 MG/ML
INJECTION, SOLUTION INTRAVENOUS CONTINUOUS
Status: DISCONTINUED | OUTPATIENT
Start: 2021-03-07 | End: 2021-03-07

## 2021-03-07 RX ORDER — PHENYLEPHRINE HCL 10 MG/ML
VIAL (ML) INJECTION AS NEEDED
Status: DISCONTINUED | OUTPATIENT
Start: 2021-03-07 | End: 2021-03-07 | Stop reason: SURG

## 2021-03-07 RX ORDER — CALCIUM CHLORIDE 100 MG/ML
INJECTION INTRAVENOUS; INTRAVENTRICULAR AS NEEDED
Status: DISCONTINUED | OUTPATIENT
Start: 2021-03-07 | End: 2021-03-07 | Stop reason: SURG

## 2021-03-07 RX ORDER — ACETAMINOPHEN 500 MG
1000 TABLET ORAL ONCE AS NEEDED
Status: ACTIVE | OUTPATIENT
Start: 2021-03-07 | End: 2021-03-07

## 2021-03-07 RX ADMIN — SODIUM CHLORIDE: 9 INJECTION, SOLUTION INTRAVENOUS at 08:50:00

## 2021-03-07 RX ADMIN — ROCURONIUM BROMIDE 20 MG: 10 INJECTION, SOLUTION INTRAVENOUS at 09:35:00

## 2021-03-07 RX ADMIN — SODIUM CHLORIDE: 9 INJECTION, SOLUTION INTRAVENOUS at 07:13:00

## 2021-03-07 RX ADMIN — ROCURONIUM BROMIDE 20 MG: 10 INJECTION, SOLUTION INTRAVENOUS at 10:52:00

## 2021-03-07 RX ADMIN — SODIUM CHLORIDE: 9 INJECTION, SOLUTION INTRAVENOUS at 08:52:00

## 2021-03-07 RX ADMIN — ROCURONIUM BROMIDE 50 MG: 10 INJECTION, SOLUTION INTRAVENOUS at 07:33:00

## 2021-03-07 RX ADMIN — ROCURONIUM BROMIDE 20 MG: 10 INJECTION, SOLUTION INTRAVENOUS at 11:20:00

## 2021-03-07 RX ADMIN — CEFOXITIN 2 G: 2 INJECTION, POWDER, FOR SOLUTION INTRAVENOUS at 09:39:00

## 2021-03-07 RX ADMIN — SODIUM CHLORIDE: 9 INJECTION, SOLUTION INTRAVENOUS at 09:40:00

## 2021-03-07 RX ADMIN — PHENYLEPHRINE HCL 100 MCG: 10 MG/ML VIAL (ML) INJECTION at 07:35:00

## 2021-03-07 RX ADMIN — EPHEDRINE SULFATE 10 MG: 50 INJECTION INTRAVENOUS at 08:35:00

## 2021-03-07 RX ADMIN — SODIUM CHLORIDE: 9 INJECTION, SOLUTION INTRAVENOUS at 12:02:00

## 2021-03-07 RX ADMIN — SODIUM CHLORIDE: 9 INJECTION, SOLUTION INTRAVENOUS at 07:52:00

## 2021-03-07 RX ADMIN — ONDANSETRON 4 MG: 2 INJECTION INTRAMUSCULAR; INTRAVENOUS at 11:45:00

## 2021-03-07 RX ADMIN — ROCURONIUM BROMIDE 20 MG: 10 INJECTION, SOLUTION INTRAVENOUS at 08:39:00

## 2021-03-07 RX ADMIN — SODIUM CHLORIDE: 9 INJECTION, SOLUTION INTRAVENOUS at 10:41:00

## 2021-03-07 RX ADMIN — SODIUM CHLORIDE: 9 INJECTION, SOLUTION INTRAVENOUS at 10:26:00

## 2021-03-07 RX ADMIN — Medication 50 ML: at 10:50:00

## 2021-03-07 RX ADMIN — MAGNESIUM SULFATE HEPTAHYDRATE 1 G: 500 INJECTION, SOLUTION INTRAMUSCULAR; INTRAVENOUS at 10:48:00

## 2021-03-07 RX ADMIN — EPHEDRINE SULFATE 20 MG: 50 INJECTION INTRAVENOUS at 08:40:00

## 2021-03-07 RX ADMIN — CEFOXITIN 2 G: 2 INJECTION, POWDER, FOR SOLUTION INTRAVENOUS at 07:40:00

## 2021-03-07 RX ADMIN — CALCIUM CHLORIDE 1 G: 100 INJECTION INTRAVENOUS; INTRAVENTRICULAR at 10:45:00

## 2021-03-07 NOTE — OPERATIVE REPORT
[facility]  Operative Note    Sonido Kole Location: OR   [facility] [id number] MRN [id number]    1965 Age 54year old   Admission Date 2/10/2021 Operation Date 3/7/2021   Attending Physician Mark Stone DO Operating Physician Frank Knox The right colon mesentery was extremely inflamed and indurated and edematous. The base of the mesentery was indurated. The pancreas was markedly indurated. There was evidence of scattered saponification.   There was no evidence of visceral or peritoneal incision was then extended up to the level of the xiphoid process and down to the suprapubic area. A Bookwalter self-retaining retractor was placed. We began dissection on the right colon. The white line of Toldt was taken down.   The retroperitoneal a device. The ileocolic and right colic pedicles were additionally suture-ligated with 2-0 Vicryl suture. The mesentery of the transverse colon and then the descending and sigmoid colon were also progressively divided with the LigaSure device.   Once reache staple line was then more easily brought out through the anterior abdominal wall. This was held in place with a Pricilla. The abdomen was once again inspected. Hemostasis was excellent.   There is no torsion or volvulus of the small intestine leading up

## 2021-03-07 NOTE — ANESTHESIA PROCEDURE NOTES
Airway  Date/Time: 3/7/2021 7:20 AM  Urgency: elective    Airway not difficult    General Information and Staff    Patient location during procedure: OR  Anesthesiologist: Jose R Biggs MD  Performed: anesthesiologist     Indications and Patient Thais Chu

## 2021-03-07 NOTE — PLAN OF CARE
Assumed care of patient around 1. Alert and oriented x4. Denies any chest pain, lightheadedness or shortness of breath. Patient c/o pain 7/10 in mid abdomen, given scheduled medication- see MAR. On 2L NC. NSR on tele. Voids. Fall precautions in place.  C electrolyte replacements, including rhythm and repeat lab results as appropriate  - Fluid restriction as ordered  - Instruct patient on fluid and nutrition restrictions as appropriate  Outcome: Progressing  Goal: Hemodynamic stability and optimal renal fun Complete POLST form as appropriate  - Assess patient's ability to be responsible for managing their own health  - Refer to Case Management Department for coordinating discharge planning if the patient needs post-hospital services based on physician/LIP ord

## 2021-03-07 NOTE — PROGRESS NOTES
[facility] PULMONARY/CRITICAL CARE    S: Pt was seen in ICU after surgery -- pt went to OR for colectomy w/ end ileostomy.     Meds:  • vasopressin (PITRESSIN) infusion for shock  0.01-0.04 Units/min Intravenous Once   • piperacillin-tazobactam  3.375 g Intravenou Resp: 12 16 16 16   Temp: 98.5 °F (36.9 °C)      TempSrc: Temporal      SpO2: 100% 100% 99% 99%   Weight:    201 lb 15.1 oz (91.6 kg)   Height:         Vent settings: VC+ 16/550/40/5    Gen - intubated, somnolent but arousable, follows command and nods h while on high dose steroids  4. Sarcoidosis - followed by Dr. Teagan Woo at [facility], previously was on plaquenil. CT scans with adenopathy and groundglass opacities.  Bronch/EBUS done 2/16--lymph node bx limited, cultures negative   -continue steroids/cellce

## 2021-03-07 NOTE — PLAN OF CARE
Patient is alert and oriented; complaining of 8/10 pain in abdomen today. Pain medications given as ordered, no improvement. Milk of magnesia w/prune juice, suppository and fleets enema given for constipation.  Patient had watery bowel  movement after the f instruct to report SOB or any respiratory difficulty  - Respiratory Therapy support as indicated  - Manage/alleviate anxiety  - Monitor for signs/symptoms of CO2 retention  Outcome: Progressing     Problem: METABOLIC/FLUID AND ELECTROLYTES - ADULT  Goal: E PLANNING  Goal: Discharge to home or other facility with appropriate resources  Description: INTERVENTIONS:  - Identify barriers to discharge w/pt and caregiver  - Include patient/family/discharge partner in discharge planning  - Arrange for needed dischar

## 2021-03-07 NOTE — PROGRESS NOTES
Pulmonary Progress Note      NAME: Veronica Hogue - ROOM:  OR POOL ROOMS/EH OR P* - MRN: [id number] - Age: 54year old - : 1965    Assessment/Plan:  1.  Atypical chest pain-   - Likely related to pancreatic mass/infection wit hrefered pain  - Sa Furoate-Vilanterol  1 puff Inhalation Daily   • [MAR Hold] atorvastatin  20 mg Oral Nightly       Intake/Output Summary (Last 24 hours) at 3/7/2021 0900  Last data filed at 3/7/2021 0306  Gross per 24 hour   Intake 300 ml   Output —   Net 300 ml     Physic

## 2021-03-07 NOTE — ANESTHESIA PREPROCEDURE EVALUATION
PRE-OP EVALUATION    Patient Name: Martha Brito    Pre-op Diagnosis: Perforated sigmoid colon ([address] [K63.1]    Procedure(s):      Surgeon(s) and Role:     * Yumiko Chew MD - Primary    Pre-op vitals reviewed.   Temp: 98.2 °F (36.8 °C)  Pulse: (TRANSDERM-SCOP) patch, 1 patch, Transdermal, Q72H  [COMPLETED] sodium chloride 0.9% IV bolus 500 mL, 500 mL, Intravenous, Once  Metoclopramide HCl (REGLAN) injection 10 mg, 10 mg, Intravenous, Q6H PRN  [COMPLETED] Prochlorperazine Edisylate (COMPAZINE) in M20) CR tab 40 mEq, 40 mEq, Oral, Once  [] 0.9% NaCl infusion, , Intravenous, Continuous  [COMPLETED] HYDROmorphone HCl (DILAUDID) 1 MG/ML injection 0.5 mg, 0.5 mg, Intravenous, Once  fenofibrate micronized (LOFIBRA) cap 201 mg, 201 mg, Oral, QAM AC anesthetic complications         GI/Hepatic/Renal  Comment: Pancreatic mass    (+) GERD          (+) liver disease (fatty liver)                 Cardiovascular  Comment: Conclusions:     1. Left ventricle:  The cavity size was normal. Wall thickness was nor OTHER SURGICAL HISTORY      kidney stones     Social History    Tobacco Use      Smoking status: Current Every Day Smoker        Packs/day: 0.25        Years: 8.00        Pack years: 2        Types: Cigarettes        Quit date: 2000        Years since quit

## 2021-03-07 NOTE — PROGRESS NOTES
[facility]  Progress Note     Edna Del Toro Patient Status:  Observation    1965 MRN [id number]   [facility] 8NE-A Attending Luda Reese DO   Hosp Day # 23 PCP Levy Madrigal MD     Chief Complaint: Chest pain, SOB    S  136 137  137   K 3.6 4.4 4.5  4.5    106 103  103   CO2 28.0 26.0 30.0  30.0   ALKPHO 45  --  48   AST 18  --  21   ALT 31  --  37   BILT 0.9  --  0.4   TP 5.2*  --  5.3*     Estimated Creatinine Clearance: 107.7 mL/min (based on SCr of 0.75 immunosuppresants and high dose steroids, trial of colchicine w/o improvement. 2D echo shows preserved EF and grade 1 diastolic dysfunction. S/p bronch, BAL and EBUS 2/16. Negative for malignancy. Cx so far negative.  Final AFB and fungus cultures so far NG DO

## 2021-03-07 NOTE — PROGRESS NOTES
[facility]  Progress Note    Erla Ear Patient Status:  Inpatient    1965 MRN [id number]   [facility] 8NE-A Attending Leobardo Murphy DO   Hosp Day # 23 PCP Graham Herrera MD     Subjective:  Over the night patient unspecified erectile dysfunction type     Chest pain     Dyspnea, unspecified type     Pain of right hip joint     Leg swelling     Right low back pain     Diverticulosis of large intestine without perforation or abscess without bleeding     Second degree prognosis, and general treatment was explained to the patient and the family.      Leonard Steinberg MD  [facility]  3/7/2021  6:28 AM

## 2021-03-07 NOTE — ANESTHESIA PROCEDURE NOTES
Central Line  Performed by: Dwight Fernandez MD  Authorized by: Dwight Fernandez MD     General Information and Staff    Procedure Start:  3/7/2021 11:33 AM  Procedure End:  3/7/2021 11:40 AM  Anesthesiologist:  Dwight Fernandez MD  Performed by:   Chinmay

## 2021-03-07 NOTE — ANESTHESIA PROCEDURE NOTES
Peripheral IV  Date/Time: 3/7/2021 7:22 AM  Inserted by: Jory Villarreal MD    Placement  Needle size: 18 G  Laterality: left  Location: forearm  Local anesthetic: none  Site prep: alcohol  Technique: anatomical landmarks  Attempts: 1

## 2021-03-07 NOTE — ANESTHESIA PROCEDURE NOTES
Arterial Line  Performed by: Abby Rosa MD  Authorized by: Abby Rosa MD     General Information and Staff    Procedure Start:  3/7/2021 7:45 AM  Procedure End:  3/7/2021 7:48 AM  Anesthesiologist:  Abby Rosa MD  Performed By:  Belle Schuster

## 2021-03-07 NOTE — ANESTHESIA PROCEDURE NOTES
Regional Block  Performed by: Sadie Mcgee MD  Authorized by: Sadie Mcgee MD       General Information and Staff    Start Time:  3/7/2021 7:25 AM  End Time:  3/7/2021 7:34 AM  Anesthesiologist:  Sadie Mcgee MD  Performed by:   Anesthesiolog

## 2021-03-07 NOTE — BRIEF OP NOTE
Pre-Operative Diagnosis: Perforated sigmoid colon ([facility] [address] [K63.1]     Post-Operative Diagnosis: Perforated sigmoid colon ([facility] [address] [K63.1]     Procedure Performed:   Open total abdominal colectomy with end ileostomy      Surgeon(s) and Role:     * Rene De La Cruz

## 2021-03-07 NOTE — PLAN OF CARE
Around 0330 patient stated abdominal pain increased to 9/10. Reassessed patient, bowel sounds now hypoactive. Notified GI who ordered STAT CT of abdomen. Assisted patient down to CT with transport. After CT resulted general surgery was notified of results.

## 2021-03-07 NOTE — PROGRESS NOTES
[facility] Pharmacy Note:  Acetaminophen Therapeutic Duplication      Angel Galloway is a(n) 54year old patient who has been prescribed both scheduled IV acetaminophen (Ofirmev) and prn oral acetaminophen .  Oral acetaminophen was discontinued per P&T approved

## 2021-03-07 NOTE — ANESTHESIA POSTPROCEDURE EVALUATION
[address] [facility] Patient Status:  [facility]   Age/Gender 54year old male MRN [id number]   [facility] 4SW-A Attending Leobardo Murphy, [facility] Day # 21 PCP Graham Herrera MD       Anesthesia Post-op Note    Procedure

## 2021-03-08 ENCOUNTER — TELEPHONE (OUTPATIENT)
Dept: FAMILY MEDICINE CLINIC | Facility: CLINIC | Age: 56
End: 2021-03-08

## 2021-03-08 PROCEDURE — 99233 SBSQ HOSP IP/OBS HIGH 50: CPT | Performed by: INTERNAL MEDICINE

## 2021-03-08 RX ORDER — METHOCARBAMOL 100 MG/ML
500 INJECTION, SOLUTION INTRAMUSCULAR; INTRAVENOUS EVERY 12 HOURS
Status: DISCONTINUED | OUTPATIENT
Start: 2021-03-08 | End: 2021-03-13

## 2021-03-08 RX ORDER — ONDANSETRON 2 MG/ML
4 INJECTION INTRAMUSCULAR; INTRAVENOUS EVERY 6 HOURS PRN
Status: DISCONTINUED | OUTPATIENT
Start: 2021-03-08 | End: 2021-03-08

## 2021-03-08 RX ORDER — HEPARIN SODIUM 5000 [USP'U]/ML
5000 INJECTION, SOLUTION INTRAVENOUS; SUBCUTANEOUS EVERY 8 HOURS SCHEDULED
Status: DISCONTINUED | OUTPATIENT
Start: 2021-03-08 | End: 2021-04-01

## 2021-03-08 RX ORDER — METHYLPREDNISOLONE SODIUM SUCCINATE 125 MG/2ML
60 INJECTION, POWDER, LYOPHILIZED, FOR SOLUTION INTRAMUSCULAR; INTRAVENOUS EVERY 8 HOURS
Status: DISCONTINUED | OUTPATIENT
Start: 2021-03-08 | End: 2021-03-10

## 2021-03-08 RX ORDER — NALOXONE HYDROCHLORIDE 0.4 MG/ML
0.08 INJECTION, SOLUTION INTRAMUSCULAR; INTRAVENOUS; SUBCUTANEOUS
Status: DISCONTINUED | OUTPATIENT
Start: 2021-03-08 | End: 2021-03-11

## 2021-03-08 RX ORDER — KETOROLAC TROMETHAMINE 15 MG/ML
15 INJECTION, SOLUTION INTRAMUSCULAR; INTRAVENOUS EVERY 6 HOURS PRN
Status: DISPENSED | OUTPATIENT
Start: 2021-03-08 | End: 2021-03-10

## 2021-03-08 RX ORDER — NALOXONE HYDROCHLORIDE 0.4 MG/ML
0.08 INJECTION, SOLUTION INTRAMUSCULAR; INTRAVENOUS; SUBCUTANEOUS
Status: DISCONTINUED | OUTPATIENT
Start: 2021-03-08 | End: 2021-03-08

## 2021-03-08 RX ORDER — DIPHENHYDRAMINE HYDROCHLORIDE 50 MG/ML
12.5 INJECTION INTRAMUSCULAR; INTRAVENOUS EVERY 4 HOURS PRN
Status: DISCONTINUED | OUTPATIENT
Start: 2021-03-08 | End: 2021-03-11

## 2021-03-08 RX ORDER — DIPHENHYDRAMINE HYDROCHLORIDE 50 MG/ML
12.5 INJECTION INTRAMUSCULAR; INTRAVENOUS EVERY 4 HOURS PRN
Status: DISCONTINUED | OUTPATIENT
Start: 2021-03-08 | End: 2021-03-08

## 2021-03-08 RX ORDER — ONDANSETRON 2 MG/ML
4 INJECTION INTRAMUSCULAR; INTRAVENOUS EVERY 6 HOURS PRN
Status: DISCONTINUED | OUTPATIENT
Start: 2021-03-08 | End: 2021-03-11

## 2021-03-08 NOTE — PROGRESS NOTES
[facility]  Progress Note    Denise Sena Patient Status:  Inpatient    1965 MRN [id number]   [facility] 4SW-A Attending Unknown DO Treasure   Hosp Day # 25 PCP Esau López MD     Subjective:  No acute events.   Extub Hyperlipidemia     Sarcoidosis     Lipoma of torso     Erectile dysfunction, unspecified erectile dysfunction type     Chest pain     Dyspnea, unspecified type     Pain of right hip joint     Leg swelling     Right low back pain     Diverticulosis of large for abdominal pain. This was significant and present prior to surgery. I have added Toradol and Robaxin.  -Patient will certainly need further evaluation of the pancreas in the future.   Intraoperatively there was a significant component of pancreatitis,

## 2021-03-08 NOTE — PROGRESS NOTES
[facility]  Inpatient Wound Care Contact Note    Patient in room 472, nurse and fellow wound care nurse in room. No visitors present.  Offered general education, patient states he is still tired and may doze off during education and much rather have os

## 2021-03-08 NOTE — PAYOR COMM NOTE
--------------  CONTINUED STAY REVIEW    Payor: JDA KLINE  Subscriber #:  [id number]  Authorization Number: [id number]    Admit date: 2/12/21  Admit time:  2:21 PM    FAXING CLINICAL UPDATE FOR 3/6/21- 3/7/21    3/6/21   Chief Complaint: Chest pain, SOB discontinued and cellcept started 2/22 with plan to increase to 1000 mg BID slowly  7. Continue home inhalers  8. Pulm discussed with [facility], patient will need follow up there  2.  Pancreatic mass with surrounding small bowel inflammation, concerning w (37 °C)] 98.2 °F (36.8 °C)  Pulse:  [] 88  Resp:  [12-24] 14  BP: (111-130)/(60-71) 116/60  Physical Exam:    General: intubated, but awake and following commands  Respiratory: mechanical breath sounds. No wheezes. No rhonchi.   Cardiovascular: S1, S2 colchicine w/o improvement. 2D echo shows preserved EF and grade 1 diastolic dysfunction. S/p bronch, BAL and EBUS 2/16. Negative for malignancy. Cx so far negative. Final AFB and fungus cultures so far NGTD  1. Pain service following  2.  Pulm following  3 User    3/7/2021 2021 New Bag 250 mL Intravenous Heather Marquez, BRENDA      fentanyl (SUBLIMAZE) 1000 mcg/100 ml NS premix infusion     Date Action Dose Route User    3/7/2021 1448 Hi-Risk Rate/Dose Change 12.5 mcg/hr Intravenous Mesfin Arteaga, RN      f (FLAGYL) IVPB premix 500 mg     Date Action Dose Route User    3/8/2021 0617 New Bag 500 mg Intravenous Navid Bush RN    3/7/2021 2232 New Bag 500 mg Intravenous Navid Bush RN      Mycophenolate Mofetil HCl (CELLCEPT) 750 mg in dextrose 5 % 125 mL IVPB     D 7344 New Bag 15 mmol Intravenous Franchester Agent, RN

## 2021-03-08 NOTE — PROGRESS NOTES
[facility]  Progress Note     Luda Carterruth Patient Status:  Observation    1965 MRN [id number]   [facility] 8NE-A Attending Dr. Brittney Quinones # 24 PCP Megan Salazar MD     Chief Complaint: Chest pain, SOB    S: pt ruma 144 143   K 3.6 4.5  4.5 3.9 3.9    103  103 113* 112   CO2 28.0 30.0  30.0 25.0 28.0   ALKPHO 45 48 38*  --    AST 18 21 14*  --    ALT 31 37 27  --    BILT 0.9 0.4 0.4  --    TP 5.2* 5.3* 3.6*  --      Estimated Creatinine Clearance: 136.9 mL/min ( with immunosuppresants and high dose steroids, trial of colchicine w/o improvement. 2. 2D echo shows preserved EF and grade 1 diastolic dysfunction. S/p bronch, BAL and EBUS 2/16. Negative for malignancy. Cx so far negative.  Final AFB and fungus cultures

## 2021-03-08 NOTE — OCCUPATIONAL THERAPY NOTE
OCCUPATIONAL THERAPY EVALUATION - INPATIENT     Room Number: 472/472-A  Evaluation Date: 3/8/2021  Type of Evaluation: Initial  Presenting Problem: chest pain, bowel perforation     Physician Order: IP Consult to Occupational Therapy  Reason for Therapy: A 6/1/2020    Performed by Tomy Miller MD at [facility] ENDOSCOPY   • COLONOSCOPY     • COLONOSCOPY  11/2015    4 small adeonomas, diverticulosis. repeat 2018   • EGD  11/2015     schatzki's ring, non obstructive.  Erosive esophagitis, LA grad 1 (no kemp ASSESSMENT  AM-PAC ‘6-Clicks’ Inpatient Daily Activity Short Form  How much help from another person does the patient currently need…  -   Putting on and taking off regular lower body clothing?: A Lot  -   Bathing (including washing, rinsing, drying)?: A L intervention. In this OT evaluation patient presents with the following performance deficits: decreased balance, endurance, strength, functional reach, coordination, c/o pain.  These deficits impact the patient’s ability to participate in self-care, funct supervision    ADDITIONAL GOALS:  Patient will independently adhere to precautions during self-care and functional mobility tasks.

## 2021-03-08 NOTE — PROGRESS NOTES
Gastroenterology Progress Note  Patient Name: Denise Sena  Chief Complaint: Severe c.diff with megacolon and colonic perforation;  Pancreatic head mass with mild roger-pancreatic stranding supportive of pancreatitis  S: The patient reports continued abd appropriate pain. He also has a pancreatic head mass, which needs further evaluation, but not now, given the acute illness. Plan:   1) Management per Dr. Amalia Cain and Dr. Tiffany Navarro  2) From a GI standpoint, we will remain available.   However, we can follo

## 2021-03-08 NOTE — HOME CARE LIAISON
MET WITH PTNT AND SPOUSE AT BEDSIDE AND OFFERED CHOICE  OF AGENCIES. PTNT AGREEABLE TO [facility]. MET WITH PTNT AND SPOUSE  TO DISCUSS HOME HEALTH SERVICES AND COVERAGE CRITERIA. PTNT AGREEABLE TO [facility].  PTNT SPOUSE GIVEN [facility] Beverley Cotter

## 2021-03-08 NOTE — PROGRESS NOTES
Acute Pain Service    POD#1 s/p Open total abdominal colectomy with end ileostomy    Patient reports pain 8/10 across lower abdomen - . He is awake, alert, and oriented at present. He states he is tired but he is happy his daughter is here to visit now.

## 2021-03-08 NOTE — PROGRESS NOTES
Pulmonary Progress Note        NAME: Dwain Dexter - ROOM: 400/821-N - MRN: [id number] - Age: 54year old - : 1965        Last 24hrs: No events overnight, pain is controlled for the moment    OBJECTIVE:   21  0400 21  0500 21 rhythm  Abdomen: hypoactive to absent BS this am  Extremities: extremities normal, atraumatic, no cyanosis or edema    Labs reviewed as noted below    ASSESSMENT/PLAN:  1.  Acute respiratory failure - due to surgery and underlying sarcoidosis  -improved  -e

## 2021-03-08 NOTE — PHYSICAL THERAPY NOTE
Order received for Physical Therapy. Spoke with RN and patient in too much pain this AM for skilled intervention. Will check status and proceed with PT services as indicated.

## 2021-03-08 NOTE — TELEPHONE ENCOUNTER
Spoke to [place] with [facility], verbal ok given that Dr. Dinah Campbell will sign off on home health orders. She will fax orders for signature.

## 2021-03-08 NOTE — PROGRESS NOTES
[address]  Report of GI Progress Note    Ravin Birks Patient Status:  Inpatient    1965 MRN [id number]   [facility] 4SW-A Attending Kush Morales, [address] [facility] Day # 21 PCP Ramírez Atkins MD     Date of Admission:   BID  PEG 3350 (MIRALAX) powder packet 17 g, 17 g, Oral, Daily PRN  magnesium hydroxide (MILK OF MAGNESIA) 400 MG/5ML suspension 30 mL, 30 mL, Oral, Daily PRN  bisacodyl (DULCOLAX) rectal suppository 10 mg, 10 mg, Rectal, Daily PRN  Fleet Enema (FLEET) 7-19 AC  atorvastatin (LIPITOR) tab 20 mg, 20 mg, Oral, Nightly  melatonin tab 3 mg, 3 mg, Oral, Nightly PRN  magnesium hydroxide (MILK OF MAGNESIA) 400 MG/5ML suspension 30 mL, 30 mL, Oral, Daily PRN        Allergies    Prednisone              HIVES    Comment the colon is decompressed. Moderate retained feces throughout the colon.    Dictated by (CST): Leia Reza DO on 3/06/2021 at 12:22 PM     Finalized by (CST): Leia Reza DO on 3/06/2021 at 12:24 PM       CT ABDOMEN+PELVIS(CPT=74176)    Result Date: 3/ inflammatory changes; mesenteritic edema. Spiculated region, possible pancreatic mass. Differential includes pancreatic head adenoCa, necrotic mass, vs carcinomatosis vs acute pancreatitis w/ necrotic mass vs. contained perforation.   2. EUS and MRI to be r

## 2021-03-08 NOTE — PLAN OF CARE
Received pt from OR to . Pt intubated, propofol and fentanyl started for sedation. Pt nods head, moves all extremities and follows commands appropriately. Weaning trial completed, pt extubated to nasal canula per RT, tolerating 3L. Pt oriented x 4.

## 2021-03-08 NOTE — PHYSICAL THERAPY NOTE
PHYSICAL THERAPY EVALUATION - INPATIENT     Room Number: 472/472-A  Evaluation Date: 3/8/2021  Type of Evaluation: Re-evaluation  Physician Order: PT Eval and Treat    Presenting Problem: s/p total abdoinal colectomy/ileostomy 3/7  Reason for Therapy loss    • Wheezing        Past Surgical History  Past Surgical History:   Procedure Laterality Date   • BIOPSY/REMOVAL, LYMPH NODE(S) Bilateral 2013    Lymph node removal (neck)   • BRONCHOSCOPY N/A 2/16/2021    Performed by Vi Gonzalez MD at [address] [place] [address] ROM is within functional limits     Lower extremity strength is within functional limits Right Hip flexion  3-/5  Left Hip flexion  3-/5  Right Knee extension  4-/5  Left Knee extension  4-/5  Right Dorsiflexion  4-/5  Left Dorsiflexion  4-/5    BALANCE  S with mod/max a for trunk, min a BLEs. Patient sat at EOB with complaints of dizziness BP as noted. Sit to stand with min a for balance and assist for line management.   Patient up in chair x 15 minutes and hospitalist requesting patient return to bed due 3-5x/week  Number of Visits to Meet Established Goals: 6      CURRENT GOALS    Goal #1 Patient is able to demonstrate supine - sit EOB @ level: modified independent     Goal #2 Patient is able to demonstrate transfers Sit to/from Stand at assistance level:

## 2021-03-08 NOTE — PLAN OF CARE
Patient resting in bed upon assessment. Patient is A&O x4. Moves all extremities but complains of pain with movement. On 3L NC, titrated as tolerable. No complaints or signs of respiratory distress. Levo gtt titrated through the night and was turned off.  P

## 2021-03-08 NOTE — PROGRESS NOTES
[facility]  Progress Note     Alanis Rod Patient Status:  Observation    1965 MRN [id number]   [facility] 8NE-A Attending Annie Fernandez, DO   Hosp Day # 25 PCP Zion Lang MD     Chief Complaint: Chest pain, SOB    S Recent Labs   Lab 03/02/21  0500 03/07/21  0527 03/07/21  1441 03/08/21  0413   * 118*  118* 96 96   BUN 27* 22*  22* 18 16   CREATSERUM 0.96 0.75  0.75 0.65* 0.59*   GFRAA 102 119  119 127 132   GFRNAA 89 103  103 110 114   CA 7.8* 7.3*  7.3* 6 PO vanc  4. PPI IV, IVF, empiric zosyn  5. Pain control  2. Chest pain, now reporting pain more c/w band of pain around his R/L flanks - concern for  abdominal etiology -  pancreatic mass with duodenal/jejunal inflammation.  He has persistent chest pain wit Full  · Hyde: No  · Central line: No    Will the patient be referred to TCC on discharge?: No  Estimated date of discharge: TBD  Discharge is dependent on: pain control, consultant recs  At this point Mr. Gonzalo Borrego is expected to be discharge to: Home    P

## 2021-03-09 PROCEDURE — 99233 SBSQ HOSP IP/OBS HIGH 50: CPT | Performed by: INTERNAL MEDICINE

## 2021-03-09 NOTE — OCCUPATIONAL THERAPY NOTE
OCCUPATIONAL THERAPY TREATMENT NOTE - INPATIENT     Room Number: 493/134-C  Session: 1   Number of Visits to Meet Established Goals: 7    Presenting Problem: chest pain, bowel perforation     History related to current admission: Patient is a 56y/o male ad small adeonomas, diverticulosis. repeat 2018   • EGD  11/2015     schatzki's ring, non obstructive. Erosive esophagitis, LA grad 1 (no barretts on path).  Gastritis, no hpylori on path   • ENDOBRONCHIAL ULTRASOUND (EBUS) N/A 2/16/2021    Performed by Manpreet Pompa technique, pt t/f to EOB with min assist with use of log roll, pt able to sit with CGA, pt completed sit to stand with min assist from EOB, pt was able to amb x1 in room with RW and min assist, therapist assist pt to complete t/f up to chair with min jesus during self-care and functional mobility tasks. PPE worn: goggles, surgical mask, iso gown, gloves.   Pt could not tolerate mask

## 2021-03-09 NOTE — CM/SW NOTE
Care Progression Note:  Active Acute Medical Issue:   Chest pain of uncertain etiology , bowel perforation~Postop day 2 status post open total abdominal colectomy with end ileostomy for perforated transverse colon in the setting of C. difficile and pancrea

## 2021-03-09 NOTE — PROGRESS NOTES
Pulmonary Progress Note        NAME: Santa Roper - ROOM: 47398-I - MRN: [id number] - Age: 54year old - : 1965        Last 24hrs: No events overnight, laying in bed, appears comfortable, rates his pain as 8/10, states it's the same as yester 0020)       Lungs: clear to auscultation bilaterally  Heart: S1, S2 normal, no murmur, click, rub or gallop, regular rate and rhythm  Abdomen: hypoactive to absent BS this am  Extremities: extremities normal, atraumatic, no cyanosis or edema    Labs review

## 2021-03-09 NOTE — CONSULTS
[facility]  Report of Inpatient Ostomy Consultation     [facility] Patient Status:  Inpatient    1965 MRN [id number]   [facility] 4SW-A 472/472-A Attending Anu Smith, [address] [facility] Day # 22 PCP Zion Lang MD • OTHER SURGICAL HISTORY      septoplasty   • OTHER SURGICAL HISTORY      kidney stones   • TOTAL ABDOMINAL COLECTOMY, END ILEOSTOMY N/A 3/7/2021    Performed by Gurpreet Salazar MD at [address] History    Tobacco Use      Smoking stat allowing me to participate in the care of your patient. Time Spent: 30 Minutes. Thank you.     Heron Ludwig RN  [facility] pager [address] [place] [address] [phone]  3/9/2021  12:21 PM

## 2021-03-09 NOTE — PHYSICAL THERAPY NOTE
PHYSICAL THERAPY TREATMENT NOTE - INPATIENT    Room Number: 472/472-A     Session: 1   Number of Visits to Meet Established Goals: 6    Presenting Problem: s/p total abdoinal colectomy/ileostomy 3/7     History related to current admission:   Hx sarcoidos Date   • BIOPSY/REMOVAL, LYMPH NODE(S) Bilateral 2013    Lymph node removal (neck)   • BRONCHOSCOPY N/A 2/16/2021    Performed by Fabiano Bertrand MD at [address] [facility] N/A 6/1/2020    Performed by Franny Nj with arms (e.g., wheelchair, bedside commode, etc.): A Little   -   Moving from lying on back to sitting on the side of the bed?: A Lot   How much help from another person does the patient currently need. ..   -   Moving to and from a bed to a chair (includ Session: Up in chair;Needs met;Call light within reach;RN aware of session/findings; All patient questions and concerns addressed;SCDs in place; Family present    ASSESSMENT   Pt demonstrating progress today, able to ambulate short distance, performed therex

## 2021-03-09 NOTE — PAYOR COMM NOTE
--------------  CONTINUED STAY REVIEW    Payor: JAD KLINE  Subscriber #:  [id number]  Authorization Number: [id number]    Admit date: 2/12/21  Admit time:  2:21 PM    FAXING CLINICAL UPDATE FOR 3/8/21- 3/9/21    3/8/21   Chief Complaint: Chest pain, SOB end ileostomy on 3/7  1. Pain control -Pain service following  2. NG tube/ice chips per surgery  3. Zosyn/flagyl IV  4. off vanco per surgery post colectomy and rectal remnant was irrigated. 5. PPI, IVF  2.  Chest pain, felt to be more abdominal discomfor laying in bed, appears comfortable, rates his pain as 8/10, states it's the same as yesterday     OBJECTIVE:    03/09/21  0400 03/09/21  0500 03/09/21  0600 03/09/21  0700   BP: 129/63 143/81 135/59 148/79   BP Location: Left arm         Pulse: 73 90 77 87 perioperative fluid shifts  7. JEANETTE  - CPAP w/ sleep  8. SALLY - resolved  - monitor bun/cr, urine output  9.  Proph - heparin tid  Dispo  - full code  -stable to transfer to floor    Lab 03/03/21  0602 03/07/21  0526 03/07/21  0527 03/07/21  1340 03/07/21  13 Route User    3/9/2021 0538 Given 5,000 Units Subcutaneous (Left Lower Abdomen) Marck Callejas RN    3/8/2021 2114 Given 5,000 Units Subcutaneous (Left Lower Abdomen) Marck Callejas RN    3/8/2021 1404 Given 5,000 Units Subcutaneous (Left Lower Abdomen Jackeline Fisher      Piperacillin Sod-Tazobactam So (ZOSYN) 3.375 g in dextrose 5% 100 mL IVPB-ADDV     Date Action Dose Route User    3/9/2021 0345 New Bag 3.375 g Intravenous Allen Stone RN    3/8/2021 2113 New Bag 3.375 g Intravenous Kelvin Fleischer

## 2021-03-09 NOTE — PROGRESS NOTES
[facility]  Progress Note    Isaac Stuart Patient Status:  Inpatient    1965 MRN [id number]   [facility] 4SW-A Attending Nifna Colón, DO   Hosp Day # 25 PCP Alton Lora MD     Subjective:  No acute events.   Pt si Pain of right hip joint     Leg swelling     Right low back pain     Diverticulosis of large intestine without perforation or abscess without bleeding     Second degree hemorrhoids     Acute gastritis     Bursitis of left shoulder     Biceps tendonitis, le the above assessment and plan and again have modified the report to reflect my opinion. No acute events. Patient complaining abdominal pain. No nausea or vomiting. Per nurse patient requiring 3 straight catheterizations for urinary retention.   Output

## 2021-03-09 NOTE — PROGRESS NOTES
Gastroenterology Progress Note  Neptali Vargas Patient Status:  Inpatient    1965 MRN [id number]   [facility] 4SW-A Attending Vani Calix Day # 25 PCP Heidy Thompson atypical chest pain 2/10/21 with admission c/b megacolon s/p perforation s/p total abd colectomy with end ileostomy and imaging revealing pancreatic head mass with mesenteric edema.  Pain currently managed, NPO per general surgery  Plan:   1. OK to be trans general appearance, as well as physical examination. The small pancreatic \"mass\" is not likely to be inducing the pain that he has described. However, given his acute illness, we can not intervene as of yet.        1) OK to transfer to the floor    2) C

## 2021-03-09 NOTE — CDS QUERY
Clarification – Condition Associated with Surgical Procedure  CLINICAL DOCUMENTATION CLARIFICATION FORM  Dear Doctor:   Clinical information (provided below) documents a condition following a surgical procedure.  For accurate ICD-10-CM code assignment to please contact Clinical Documentation :   Hima Deleon  [address] [address], Cell# [phone]                          Thank you!                                                          THIS FORM IS A PERMANENT PART OF THE MEDICAL RECORD

## 2021-03-09 NOTE — PLAN OF CARE
Assumed pt care at 0730. Pt was drowsy this AM but alert and oriented. Complaining of 9/10 pain most of the day. PRN medication given. See MAR. PCA added by pain services today. Stoma of ostomy is pink and beefy with stool output.  Bilateral EDYTA drains with

## 2021-03-09 NOTE — PROGRESS NOTES
Acute Pain Service     POD#2 s/p Open total abdominal colectomy with end ileostomy     Patient remains NPO with NGT. He reports pain across lower abdomen and at incision today - states pain is \"mild\" at rest and increases with activity.  He is up with PT

## 2021-03-09 NOTE — PROGRESS NOTES
[facility]  Progress Note     Sai Hayes Patient Status:  Observation    1965 MRN [id number]   [facility] 8NE-A Attending Dr. Thomas Rogers # 22 PCP Lamar Mehta MD     Chief Complaint: Chest pain, SOB    S: pt with ALB 2.5* 1.8*  --   --      137 144 143 143   K 4.5  4.5 3.9 3.9 3.6     103 113* 112 111   CO2 30.0  30.0 25.0 28.0 26.0   ALKPHO 48 38*  --   --    AST 21 14*  --   --    ALT 37 27  --   --    BILT 0.4 0.4  --   --    TP 5.3* 3.6*  --   -- WNL, continue PPI   2. GI/surgical oncology signed off > will need EGD/EUS in 6 weeks once inflammation down per Dr. Anthony Rey  3. Pain management as above  4.  Sarcoidosis  1. treated for >3 weeks for sarcoid flare with immunosuppresants and high dose stero

## 2021-03-09 NOTE — PLAN OF CARE
Received patient at 1. Pt Alert and Oriented x4, forgetful at times. Tele Rhythm NSR. O2 saturation > 90% on 2-3 L NC, weaning as tolerated. Breath sounds diminished. Weak, productive cough noted. Encouraging IS.    EDYTA drains and ostomy maintained, see

## 2021-03-09 NOTE — CDS QUERY
Medication Correlation to Diagnosis  CLINICAL DOCUMENTATION CLARIFICATION FORM    Dear Doctor:  Clinical information (provided below) documents a prescribed medication without an associated diagnosis.  For accurate ICD-10-CM code assignment to reflect sever

## 2021-03-09 NOTE — PROGRESS NOTES
[facility]  Progress Note     Brianna Ramirez Patient Status:  Observation    1965 MRN [id number]   [facility] 8NE-A Attending Kristyn Singh, DO   Hosp Day # 22 PCP Justina Evans MD     Chief Complaint: Chest pain, SOB    S BUN 22*  22* 18 16 16   CREATSERUM 0.75  0.75 0.65* 0.59* 0.42*   GFRAA 119  119 127 132 152   GFRNAA 103  103 110 114 131   CA 7.3*  7.3* 6.3* 6.6* 7.3*   ALB 2.5* 1.8*  --   --      137 144 143 143   K 4.5  4.5 3.9 3.9 3.6     103 113* 112 control  3. Continue IV flagyl, okay to discontinue PO vanc  4. NGT/NPO  5. PPI IV, IVF, empiric zosyn  6. Pain control  7. Okay to transfer out of ICU from medicine standpoint  2.  Chest pain, now reporting pain more c/w band of pain around his R/L flanks regimen  13. Anxiety  1.  Psych to eval and started on PRN xanax    Quality:  · DVT Prophylaxis: Lovenox  · CODE status: Full  · Hyde: No  · Central line: No    Will the patient be referred to TCC on discharge?: No  Estimated date of discharge: TBD  Discha

## 2021-03-10 PROCEDURE — 99232 SBSQ HOSP IP/OBS MODERATE 35: CPT | Performed by: INTERNAL MEDICINE

## 2021-03-10 RX ORDER — DEXTROSE, SODIUM CHLORIDE, AND POTASSIUM CHLORIDE 5; .45; .15 G/100ML; G/100ML; G/100ML
INJECTION INTRAVENOUS CONTINUOUS
Status: DISCONTINUED | OUTPATIENT
Start: 2021-03-10 | End: 2021-03-14

## 2021-03-10 RX ORDER — POTASSIUM CHLORIDE 14.9 MG/ML
20 INJECTION INTRAVENOUS ONCE
Status: COMPLETED | OUTPATIENT
Start: 2021-03-10 | End: 2021-03-10

## 2021-03-10 RX ORDER — METHYLPREDNISOLONE SODIUM SUCCINATE 40 MG/ML
40 INJECTION, POWDER, LYOPHILIZED, FOR SOLUTION INTRAMUSCULAR; INTRAVENOUS EVERY 8 HOURS
Status: DISCONTINUED | OUTPATIENT
Start: 2021-03-10 | End: 2021-03-13

## 2021-03-10 NOTE — PLAN OF CARE
Problem: CARDIOVASCULAR - ADULT  Goal: Maintains optimal cardiac output and hemodynamic stability  Description: INTERVENTIONS:  - Monitor vital signs, rhythm, and trends  - Monitor for bleeding, hypotension and signs of decreased cardiac output  - Evalua maintained  Description: INTERVENTIONS:  - Monitor labs and assess for signs and symptoms of volume excess or deficit  - Monitor intake, output and patient weight  - Monitor urine specific gravity, serum osmolarity and serum sodium as indicated or ordered Gurmeet Call Bruises on abdomen and b/l arms . Up in chair. Very weak . Plan of care discussed , answered all questions . Verbalized understanding . Hyde draining yellow urine .  Will continue to monitor

## 2021-03-10 NOTE — PROGRESS NOTES
N/g tube clamped until 1700, denies any nausea or vomiting , residual 50cc , dc'd n/g tube per order . Started clear liquid diet .  Updated with patient and family

## 2021-03-10 NOTE — PROGRESS NOTES
[facility]  Progress Note    Santa Roper Patient Status:  Inpatient    1965 MRN [id number]   [facility] 4SW-A Attending Nela Tiwari,    Hosp Day # 32 PCP Jos Ha MD     Subjective:  Patient transferred out of torso     Erectile dysfunction, unspecified erectile dysfunction type     Chest pain     Dyspnea, unspecified type     Pain of right hip joint     Leg swelling     Right low back pain     Diverticulosis of large intestine without perforation or abscess AM

## 2021-03-10 NOTE — OCCUPATIONAL THERAPY NOTE
OCCUPATIONAL THERAPY TREATMENT NOTE - INPATIENT     Room Number: 325/325-A  Session: 2  Number of Visits to Meet Established Goals: 7    Presenting Problem: chest pain, bowel perforation     History related to current admission: Patient is a 56y/o male adm small adeonomas, diverticulosis. repeat 2018   • EGD  11/2015     schatzki's ring, non obstructive. Erosive esophagitis, LA grad 1 (no barretts on path).  Gastritis, no hpylori on path   • ENDOBRONCHIAL ULTRASOUND (EBUS) N/A 2/16/2021    Performed by Britton Rosas A) via RW. Seated grooming task with setup. [facility] reps in major planes. Pt reports his arms feel weak. Patient End of Session: Up in chair;Needs met;Call light within reach;RN aware of session/findings; All patient questions and con

## 2021-03-10 NOTE — WOUND PROGRESS NOTE
[facility]  Report of Inpatient Ostomy Progress     Tyler Moore Patient Status:  Inpatient    1965 MRN [id number]   [facility] 3NW-A 325/325-A Attending Ramila Boone MD   Hosp Day # 32 PCP Noble Chavarria MD     ZACK

## 2021-03-10 NOTE — PROGRESS NOTES
[facility]  Progress Note     Budd Route Patient Status:  Observation    1965 MRN [id number]   [facility] 8NE-A Attending Dr. Darcy Sheehan Day # 32 PCP Carlos Masters MD     Chief Complaint: Chest pain, SOB    S: pt with 131 134   CA 7.3*  7.3* 6.3* 6.6* 7.3* 7.5*   ALB 2.5* 1.8*  --   --   --      137 144 143 143 141   K 4.5  4.5 3.9 3.9 3.6 3.5     103 113* 112 111 112   CO2 30.0  30.0 25.0 28.0 26.0 24.0   ALKPHO 48 38*  --   --   --    AST 21 14*  --   -- mass with surrounding small bowel inflammation  1. elevated CA 19-9 rising  2. MRI/MRCP noted. Lipase WNL  3. continue PPI   4. Future EGD/EUS in 6 weeks once inflammation down per Dr. Cody Silva  5. Pain management as above  4.  Sarcoidosis  1. treated for >

## 2021-03-10 NOTE — PLAN OF CARE
Problem: abd pain, total abdominal colectomy, new ileostomy  Data: Patient alert and oriented overnight, anxious at times. Patient on room air, maintaining saturation >90%. Patient reports upper abd pain, Fentanyl PCA in place.  Patient with indwelling fole Plan goals for specific interventions  Outcome: Progressing     Problem: GENITOURINARY - ADULT  Goal: Absence of urinary retention  Description: INTERVENTIONS:  - Assess patient’s ability to void and empty bladder  - Monitor intake/output and perform bladd

## 2021-03-10 NOTE — PHYSICAL THERAPY NOTE
PHYSICAL THERAPY TREATMENT NOTE - INPATIENT    Room Number: 992   Session: 2  Number of Visits to Meet Established Goals: 6    Presenting Problem: s/p total abdoinal colectomy/ileostomy 3/7     History related to current admission:   Hx sarcoidosis since BIOPSY/REMOVAL, LYMPH NODE(S) Bilateral 2013    Lymph node removal (neck)   • BRONCHOSCOPY N/A 2/16/2021    Performed by Lis Ludwig MD at [address] [facility] N/A 6/1/2020    Performed by Leonides Schwartz MD at E down on and standing up from a chair with arms (e.g., wheelchair, bedside commode, etc.): A Little   -   Moving from lying on back to sitting on the side of the bed?: A Little   How much help from another person does the patient currently need. ..   -   Mov conservation;Patient education; Family education;Gait training;Strengthening;Stair training;Transfer training;Balance training  Rehab Potential : Good  Frequency (Obs): 3-5x/week    CURRENT GOALS   Goal #1 Patient is able to demonstrate supine - sit EOB @ l

## 2021-03-10 NOTE — PROGRESS NOTES
[id number] Patient Status:  Inpatient    1965 MRN [id number]   [facility] 3NW-A Attending Chalino Tyler MD   Hosp Day # 32 PCP Lemuel Burks MD     SUBJECTIVE: no new events.   Still c/o 8/10 pain     OBJ mg, 1,000 mg, Intravenous, Q6H  •  Pantoprazole Sodium (PROTONIX) 40 mg in Sodium Chloride (PF) 0.9 % 10 mL IV push, 40 mg, Intravenous, Daily  •  docusate sodium (COLACE) liquid 100 mg, 100 mg, Oral, BID  •  PEG 3350 (MIRALAX) powder packet 17 g, 17 g, Or appearance: alert, appears stated age and cooperative  Lungs: clear to auscultation bilaterally  Heart: S1, S2 normal, no murmur, click, rub or gallop, regular rate and rhythm  Abdomen: mild TD with palpation, ND, +BS  Extremities: extremities normal, atra healed from recent surgery  6. EJANETTE  - CPAP w/ sleep  7. Proph - heparin tid  8.  Dispo - full code  -will follow    Deborah Power MD  3/10/2021  11:35 AM

## 2021-03-10 NOTE — PROGRESS NOTES
Acute Pain Service     POD#3 s/p Open total abdominal colectomy with end ileostomy     Patient remains NPO with NGT. He states his pain worsened yesterday - TAP blocks may have worn off.  Patient reports pain is worse in lower chest and upper abdomen today

## 2021-03-10 NOTE — PROGRESS NOTES
Gastroenterology Progress Note  Patient Name: Luda Pickett  Chief Complaint: Chest/abdominal pain. S: The patient reports continued lower chest / upper abdominal pain. Today, seems a little more present than yesterday.   If he sits, he says it is a l  143 141   K 3.9 3.6 3.5    111 112   CO2 28.0 26.0 24.0     Recent Labs   Lab 03/10/21  0608   RBC 2.75*   HGB 8.3*   HCT 24.5*   MCV 89.1   MCH 30.2   MCHC 33.9   RDW 15.0   NEPRELIM 12.18*   WBC 13.3*   .0       Recent Labs   Lab 03

## 2021-03-10 NOTE — PAYOR COMM NOTE
--------------  CONTINUED STAY REVIEW    Payor: JAD KLINE  Subscriber #:  [id number]  Authorization Number: [id number]    Admit date: 2/12/21  Admit time:  2:21 PM    FAXING CLINICAL UPDATE FOR 3/10/21    3/10/21   SUBJECTIVE: no new events.   Still c/o 8/ need for high dose steroids   - ppi while on high dose steroids  4. Sarcoidosis - followed by Dr. Shani Nielsen at [facility], previously was on plaquenil. CT scans with adenopathy and groundglass opacities.  Bronch/EBUS done 2/16--lymph node bx limited, cultures nurse visit when available  -Recommend at least a week of IV Zosyn given colonic perforation and stool spillage intraoperatively. -Rectal catheter has been removed- continue IV flagyl for C dif   -Pain management service on board for abdominal pain.   Pt u mg Intravenous Jenna Negrete RN    3/9/2021 9975 Given 60 mg Intravenous Spencer Hernandez [facility]    3/9/2021 1623 Given 60 mg Intravenous Jackeline Fisher      metRONIDAZOLE in NaCl (FLAGYL) IVPB premix 500 mg     Date Action Dose Route User    3/10/2021 0

## 2021-03-11 ENCOUNTER — ANESTHESIA EVENT (OUTPATIENT)
Dept: ENDOSCOPY | Facility: HOSPITAL | Age: 56
DRG: 981 | End: 2021-03-11
Payer: COMMERCIAL

## 2021-03-11 ENCOUNTER — APPOINTMENT (OUTPATIENT)
Dept: GENERAL RADIOLOGY | Facility: HOSPITAL | Age: 56
DRG: 981 | End: 2021-03-11
Attending: HOSPITALIST
Payer: COMMERCIAL

## 2021-03-11 PROCEDURE — 99232 SBSQ HOSP IP/OBS MODERATE 35: CPT | Performed by: INTERNAL MEDICINE

## 2021-03-11 PROCEDURE — 71045 X-RAY EXAM CHEST 1 VIEW: CPT | Performed by: HOSPITALIST

## 2021-03-11 RX ORDER — DIPHENHYDRAMINE HYDROCHLORIDE 50 MG/ML
12.5 INJECTION INTRAMUSCULAR; INTRAVENOUS EVERY 4 HOURS PRN
Status: DISCONTINUED | OUTPATIENT
Start: 2021-03-11 | End: 2021-03-14

## 2021-03-11 RX ORDER — NALOXONE HYDROCHLORIDE 0.4 MG/ML
0.08 INJECTION, SOLUTION INTRAMUSCULAR; INTRAVENOUS; SUBCUTANEOUS
Status: DISCONTINUED | OUTPATIENT
Start: 2021-03-11 | End: 2021-03-14

## 2021-03-11 RX ORDER — LORAZEPAM 2 MG/ML
0.25 INJECTION INTRAMUSCULAR NIGHTLY
Status: DISCONTINUED | OUTPATIENT
Start: 2021-03-11 | End: 2021-03-11

## 2021-03-11 RX ORDER — LORAZEPAM 2 MG/ML
0.25 INJECTION INTRAMUSCULAR EVERY 4 HOURS PRN
Status: DISCONTINUED | OUTPATIENT
Start: 2021-03-11 | End: 2021-03-13

## 2021-03-11 RX ORDER — LORAZEPAM 2 MG/ML
0.25 INJECTION INTRAMUSCULAR EVERY 4 HOURS PRN
Status: DISCONTINUED | OUTPATIENT
Start: 2021-03-11 | End: 2021-03-11

## 2021-03-11 RX ORDER — ONDANSETRON 2 MG/ML
4 INJECTION INTRAMUSCULAR; INTRAVENOUS EVERY 6 HOURS PRN
Status: DISCONTINUED | OUTPATIENT
Start: 2021-03-11 | End: 2021-03-14

## 2021-03-11 RX ORDER — GABAPENTIN 100 MG/1
100 CAPSULE ORAL 3 TIMES DAILY
Status: DISCONTINUED | OUTPATIENT
Start: 2021-03-11 | End: 2021-04-01

## 2021-03-11 NOTE — ANESTHESIA PREPROCEDURE EVALUATION
PRE-OP EVALUATION    Patient Name: Neptali Vargas    Pre-op Diagnosis: Upper abdominal pain [R10.10]    Procedure(s): EGD    Surgeon(s) and Role:     * Jasvir Cárdenas MD - Primary    Pre-op vitals reviewed.   Temp: 97.8 °F (36.6 °C)  Pulse: 82  Resp: 2 PRN  magnesium hydroxide (MILK OF MAGNESIA) 400 MG/5ML suspension 30 mL, 30 mL, Oral, Daily PRN  bisacodyl (DULCOLAX) rectal suppository 10 mg, 10 mg, Rectal, Daily PRN  Fleet Enema (FLEET) 7-19 GM/118ML enema 133 mL, 1 enema, Rectal, Once PRN  Mycophenola 55-60%.    Doppler parameters are consistent with abnormal left ventricular      relaxation - grade 1 diastolic dysfunction. 2. Right ventricle: The cavity size was normal. Systolic function was      normal. Systolic pressure was not estimated.    3. Michel Heredia Clostridium difficile     Bowel perforation [facility])          Past Surgical History:   Procedure Laterality Date   • BIOPSY/REMOVAL, LYMPH NODE(S) Bilateral 2013    Lymph node removal (neck)   • BRONCHOSCOPY N/A 2/16/2021    Performed by Garland Medina MD a 03/11/2021    CO2 22.0 03/11/2021    BUN 12 03/11/2021    CREATSERUM 0.30 (L) 03/11/2021     (H) 03/11/2021    CA 7.9 (L) 03/11/2021     Lab Results   Component Value Date    INR 1.16 (H) 03/07/2021         Airway      Mallampati: II  Mouth opening:

## 2021-03-11 NOTE — PROGRESS NOTES
[facility]  Progress Note     Ravin Del Cid Patient Status:  Observation    1965 MRN [id number]   [facility] 8NE-A Attending Dr. Zendejas Reading Day # 32 PCP Ramírez Atkins MD     Chief Complaint: Chest pain, SOB    S: pt repo 7. 3* 7.5* 7.9*   ALB 2.5* 1.8*  --   --   --      137 144 143 141 136   K 4.5  4.5 3.9 3.6 3.5 3.7     103 113* 111 112 108   CO2 30.0  30.0 25.0 26.0 24.0 22.0   ALKPHO 48 38*  --   --   --    AST 21 14*  --   --   --    ALT 37 27  --   --   - mass with surrounding small bowel inflammation  1. elevated CA 19-9 rising  2. MRI/MRCP noted. Lipase WNL  3. continue PPI   4. EGD timing per GI  5. Pain management as above  4.  Sarcoidosis  1. treated for >3 weeks for sarcoid flare with immunosuppresants

## 2021-03-11 NOTE — PROGRESS NOTES
Pulmonary Progress Note        NAME: Denise Sena - ROOM: [facility]-D - MRN: [id number] - Age: 54year old - : 1965        Last 24hrs: Overnight events reviewed and discussed with pain service and pt, conts to c/o 8/10 pain    OBJECTIVE:   / normal, no murmur, click, rub or gallop, regular rate and rhythm  Abdomen: soft, sequelae of surgery  Extremities: extremities normal, atraumatic, no cyanosis or edema    Labs reviewed as noted below        ASSESSMENT/PLAN:  1.  Acute respiratory failure -

## 2021-03-11 NOTE — PROGRESS NOTES
Acute Pain Service     POD#4 s/p Open total abdominal colectomy with end ileostomy     Patient on CLD  Pain reports unchanged - 8/10 across upper abdomen. It is challenging to assess his pain as he always reprorts his pain is 8/10.  He does appear uncomfort

## 2021-03-11 NOTE — DIETARY NOTE
[facility]    NUTRITION ASSESSMENT    Pt does not meet malnutrition criteria.     NUTRITION INTERVENTION:    1. RD nutrition Care Plan- Monitor need for ONS (oral nutritional supplements) and Recommend alternative nutrition support if unable to take ad yesterday, pt remains with SOB. RD to follow per protocol. ANTHROPOMETRICS:  Ht: 172.7 cm (5' 8\")  Wt: 85.3 kg (188 lb). This is 122% of IBW  BMI: Body mass index is 28.59 kg/m².   IBW: 70 kg    WEIGHT HISTORY: 10 lb weight loss in 1 day, possible erro - New  2.  Return to PO intake in 24-48 hrs - New    MEDICATIONS:  Solu-Medrol, Abx IV, Colace, K Phos 15 mmol IV, D5 0.5 NS 20 meq KCl IV at 100 ml/hr    LABS:  Phos 1.8    Pt is at High nutrition risk    FOLLOW-UP DATE: 3/12/21    Venkat Leong 61 In

## 2021-03-11 NOTE — PROGRESS NOTES
[facility]  Progress Note    Tong Gaytan Patient Status:  Inpatient    1965 MRN [id number]   [facility] 3NW-A Attending Landen Singh MD   Hosp Day # 32 PCP Derick Washington MD     Subjective:   The patient reports abdomin erectile dysfunction type     Chest pain     Dyspnea, unspecified type     Pain of right hip joint     Leg swelling     Right low back pain     Diverticulosis of large intestine without perforation or abscess without bleeding     Second degree hemorrhoids relevant changes in editing her note. I have personally examined the patient and reviewed all relevant labs and reports. I agree with the above assessment and plan and again have modified the report to reflect my opinion. Tolerating clears. Mild nausea.

## 2021-03-11 NOTE — PLAN OF CARE
A&Ox4, VSS, lungs clear, diminished bilaterally, breathing regular/non-labored on RA, abd is soft, non-distended, bowel sounds active, pt denies nausea, R sided Ileostomy producing greenish liquid output, red stoma w/ new bag applied,  Bilateral EDYTA to bulb to discharge w/pt and caregiver  - Include patient/family/discharge partner in discharge planning  - Arrange for needed discharge resources and transportation as appropriate  - Identify discharge learning needs (meds, wound care, etc)  - Arrange for interp restrictions as appropriate  Outcome: Progressing  Goal: Hemodynamic stability and optimal renal function maintained  Description: INTERVENTIONS:  - Monitor labs and assess for signs and symptoms of volume excess or deficit  - Monitor intake, output and pa cluster care as able  - See additional Care Plan goals for specific interventions  Outcome: Progressing     Problem: GENITOURINARY - ADULT  Goal: Absence of urinary retention  Description: INTERVENTIONS:  - Assess patient’s ability to void and empty bladde

## 2021-03-11 NOTE — PLAN OF CARE
Problem: CARDIOVASCULAR - ADULT  Goal: Maintains optimal cardiac output and hemodynamic stability  Description: INTERVENTIONS:  - Monitor vital signs, rhythm, and trends  - Monitor for bleeding, hypotension and signs of decreased cardiac output  - Evalua maintained  Description: INTERVENTIONS:  - Monitor labs and assess for signs and symptoms of volume excess or deficit  - Monitor intake, output and patient weight  - Monitor urine specific gravity, serum osmolarity and serum sodium as indicated or ordered wound care, etc)  - Arrange for interpreters to assist at discharge as needed  - Consider post-discharge preferences of patient/family/discharge partner  - Complete POLST form as appropriate  - Assess patient's ability to be responsible for managing their and orient x 4 , on room air , on tele with sr . Denies any chest pain . Sob with exertion . Refused to get up today , states  I am so tired  today . Vitals stable . Abdomen soft , bs present , ileostomy draining coffee brown color drainage.  Abdominal inci

## 2021-03-11 NOTE — WOUND PROGRESS NOTE
[facility]  Report of Inpatient Ostomy Progress     Neptali Vargas Patient Status:  Inpatient    1965 MRN [id number]   [facility] 3NW-A 325/325-A Attending Faustino Mittal MD   Hosp Day # 32 PCP Heidy Thompson MD      PL

## 2021-03-11 NOTE — PROGRESS NOTES
Gastroenterology Progress Note  Patient Name: Jadyn Faustin  Chief Complaint: Abdominal / chest pain  S: The patient reports continued pain in a band-like pattern across the epigastrium / low chest.  No n/v.   O: /66 (BP Location: Left arm)   Puls EUS for pancreatic lesion. I have discussed this case with Dr. Marycarmen Higgins, who is in agreement that EGD would be safe to proceed with .

## 2021-03-12 ENCOUNTER — ANESTHESIA (OUTPATIENT)
Dept: ENDOSCOPY | Facility: HOSPITAL | Age: 56
DRG: 981 | End: 2021-03-12
Payer: COMMERCIAL

## 2021-03-12 PROCEDURE — 0DB68ZX EXCISION OF STOMACH, VIA NATURAL OR ARTIFICIAL OPENING ENDOSCOPIC, DIAGNOSTIC: ICD-10-PCS | Performed by: INTERNAL MEDICINE

## 2021-03-12 PROCEDURE — 99232 SBSQ HOSP IP/OBS MODERATE 35: CPT | Performed by: INTERNAL MEDICINE

## 2021-03-12 PROCEDURE — 3E0336Z INTRODUCTION OF NUTRITIONAL SUBSTANCE INTO PERIPHERAL VEIN, PERCUTANEOUS APPROACH: ICD-10-PCS | Performed by: STUDENT IN AN ORGANIZED HEALTH CARE EDUCATION/TRAINING PROGRAM

## 2021-03-12 RX ORDER — NALOXONE HYDROCHLORIDE 0.4 MG/ML
80 INJECTION, SOLUTION INTRAMUSCULAR; INTRAVENOUS; SUBCUTANEOUS AS NEEDED
Status: DISCONTINUED | OUTPATIENT
Start: 2021-03-12 | End: 2021-03-12 | Stop reason: HOSPADM

## 2021-03-12 RX ORDER — LORAZEPAM 2 MG/ML
0.5 INJECTION INTRAMUSCULAR NIGHTLY
Status: DISCONTINUED | OUTPATIENT
Start: 2021-03-12 | End: 2021-03-13

## 2021-03-12 RX ORDER — LIDOCAINE HYDROCHLORIDE 10 MG/ML
INJECTION, SOLUTION EPIDURAL; INFILTRATION; INTRACAUDAL; PERINEURAL AS NEEDED
Status: DISCONTINUED | OUTPATIENT
Start: 2021-03-12 | End: 2021-03-12 | Stop reason: SURG

## 2021-03-12 RX ORDER — SODIUM CHLORIDE 9 MG/ML
INJECTION, SOLUTION INTRAVENOUS CONTINUOUS PRN
Status: DISCONTINUED | OUTPATIENT
Start: 2021-03-12 | End: 2021-03-12 | Stop reason: SURG

## 2021-03-12 RX ORDER — AMLODIPINE BESYLATE 5 MG/1
5 TABLET ORAL DAILY
Status: DISCONTINUED | OUTPATIENT
Start: 2021-03-13 | End: 2021-03-23

## 2021-03-12 RX ORDER — DEXTROSE MONOHYDRATE 25 G/50ML
50 INJECTION, SOLUTION INTRAVENOUS
Status: DISCONTINUED | OUTPATIENT
Start: 2021-03-12 | End: 2021-03-12 | Stop reason: HOSPADM

## 2021-03-12 RX ORDER — SODIUM CHLORIDE, SODIUM LACTATE, POTASSIUM CHLORIDE, CALCIUM CHLORIDE 600; 310; 30; 20 MG/100ML; MG/100ML; MG/100ML; MG/100ML
INJECTION, SOLUTION INTRAVENOUS CONTINUOUS
Status: DISCONTINUED | OUTPATIENT
Start: 2021-03-12 | End: 2021-03-14

## 2021-03-12 RX ORDER — ONDANSETRON 2 MG/ML
4 INJECTION INTRAMUSCULAR; INTRAVENOUS AS NEEDED
Status: DISCONTINUED | OUTPATIENT
Start: 2021-03-12 | End: 2021-03-12 | Stop reason: HOSPADM

## 2021-03-12 RX ADMIN — LIDOCAINE HYDROCHLORIDE 30 MG: 10 INJECTION, SOLUTION EPIDURAL; INFILTRATION; INTRACAUDAL; PERINEURAL at 11:12:00

## 2021-03-12 RX ADMIN — SODIUM CHLORIDE: 9 INJECTION, SOLUTION INTRAVENOUS at 11:25:00

## 2021-03-12 RX ADMIN — SODIUM CHLORIDE: 9 INJECTION, SOLUTION INTRAVENOUS at 11:06:00

## 2021-03-12 NOTE — CM/SW NOTE
Discussed therapy recommendations with PT and Dirk MATT. Changed rec to acute rehab. Pt is agreeable to rehab consult. PMR order placed.      Ev Ugarte RN, [address] [place] [address]  Extension [phone]

## 2021-03-12 NOTE — PROGRESS NOTES
Acute Pain Service     POD#5 s/p Open total abdominal colectomy with end ileostomy  Hospital day #30     Patient NPO for EGD at 11a  He reports pain is \"a little bit\" at present sitting in recliner but rates pain 6/10 at present.  He reports intermitten

## 2021-03-12 NOTE — PAYOR COMM NOTE
--------------  CONTINUED STAY REVIEW    Payor: JAD KLINE  Subscriber #:  [id number]  Authorization Number: [id number]    Admit date: 2/12/21  Admit time:  2:21 PM    FAXING CLINICAL UPDATE FOR 3/12/21    3/12/21   Chief Complaint: Chest pain, SOB     S:    1. Cdiff toxic megacolon with perforated bowel   1. Had urgent decompression 3/6 with GI, worsening abdominal pain and CTAP with perforation. Underwent urgent total abdominal colectomy with end ileostomy on 3/7  2. PCA per pain service  3.  Drains x2 1,000 mg     Date Action Dose Route     3/12/2021 1219 New Bag 1,000 mg Intravenous     3/12/2021 0540 New Bag 1,000 mg Intravenous     3/11/2021 2339 New Bag 1,000 mg Intravenous     3/11/2021 1832 New Bag 1,000 mg Intravenous       amLODIPine Besylate (N Intravenous       Mycophenolate Mofetil HCl (CELLCEPT) 750 mg in dextrose 5 % 125 mL IVPB     Date Action Dose Route     3/12/2021 0984 New Bag 750 mg Intravenous     3/11/2021 2106 New Bag 750 mg Intravenous       Pantoprazole Sodium (PROTONIX) 40 mg in S

## 2021-03-12 NOTE — PROGRESS NOTES
[facility]  Progress Note    Isaac Stuart Patient Status:  Inpatient    1965 MRN [id number]   [facility] 3NW-A Attending Ruy Paz MD   Hosp Day # 29 PCP Alton Lora MD     Subjective:   The patient is resting in b ALKPHO 73 03/12/2021    ALT 25 03/12/2021    AST 14 03/12/2021    BILT 0.3 03/12/2021    ALB 1.6 03/12/2021    TP 4.7 03/12/2021          Assessment:  Patient Active Problem List:     Hyperlipidemia     Sarcoidosis     Lipoma of torso     Erectile dysfunct have reviewed the above note and evaluation by the physician assistant. I agree with her physical exam and the data listed in the report, and I have made any relevant changes in editing her note.  I have personally examined the patient and reviewed all rele

## 2021-03-12 NOTE — OPERATIVE REPORT
EGD operative report  Patient Name: Luda iPckett  Procedure: Esophagogastroduodenoscopy with cold forceps biopsy   Date of procedure: 3/12/2021    Indication: Epigastric abdominal pain, abnormal CT scan  Attending: Patsy Posada M.D. Consent:   The appreciated at 40 cm from the incisors. Stomach: The gastric body, antrum revealed antral erythema without masses, polyps, ulcers, or erosions.   The fundus, cardia, and angularis were normal, without masses, polyps, ulcers, erosions, diverticula, or

## 2021-03-12 NOTE — PROGRESS NOTES
[facility]  Outpatient Wound Care Contact Note  Patient seen room 325 with fellow wound care nurse, physical therapy in the room. Patient states he would like pouch change at a later time due to, \"getting a biopsy/procedure after this. \" Spoke to Valery Mckinney

## 2021-03-12 NOTE — PROGRESS NOTES
[facility]  Progress Note     Dwain Dexter Patient Status:  Observation    1965 MRN [id number]   [facility] 8NE-A Attending Dr. Radha Bello Day # 29 PCP Sha Ochoa MD     Chief Complaint: Chest pain, SOB    S: p 96 106* 121* 189*  189*   BUN 22*  22* 18 18 12 9  9   CREATSERUM 0.75  0.75 0.65* 0.40* 0.30* 0.41*  0.41*   GFRAA 119  119 127 155 174 153  153   GFRNAA 103  103 110 134 150 132  132   CA 7.3*  7.3* 6.3* 7.5* 7.9* 7.5*  7.5*   ALB 2.5* 1.8*  --   --  1.6 x2  4. Zosyn/flagyl IV  5. off vanco per surgery post colectomy and rectal remnant was irrigated. 6. Plans to start TPN - dietitian following  7. high output ostomy, stop colace  2. Chest pain, felt to be more abdominal discomfort  3.  Pancreatic mass wit PA  2:27 PM     Pt seen and examined. Agree with above. EGD reviewed with GI. Plan for CT abd tmrw. Start TPN today for nutrition.      /77 (BP Location: Left arm)   Pulse 85   Temp 97.8 °F (36.6 °C) (Oral)   Resp 18   Ht 172.7 cm (5' 8\")   Wt 188 lb

## 2021-03-12 NOTE — PHYSICAL THERAPY NOTE
PHYSICAL THERAPY TREATMENT NOTE - INPATIENT    Room Number: 374   Session: 3  Number of Visits to Meet Established Goals: 6    Presenting Problem: s/p total abdoinal colectomy/ileostomy 3/7     History related to current admission:   Hx sarcoidosis since BIOPSY/REMOVAL, LYMPH NODE(S) Bilateral 2013    Lymph node removal (neck)   • BRONCHOSCOPY N/A 2/16/2021    Performed by Henrique Nguyen MD at [address] [facility] N/A 6/1/2020    Performed by Michael Perez MD at E Turning over in bed (including adjusting bedclothes, sheets and blankets)?: None   -   Sitting down on and standing up from a chair with arms (e.g., wheelchair, bedside commode, etc.): A Lot   -   Moving from lying on back to sitting on the side of the bed of session/findings; All patient questions and concerns addressed; Alarm set    ASSESSMENT   Pt continues to present with impaired strength, balance and decreased endurance below PLOF. Pt limited by pain this session.  Pt will continue to benefit from ongoing

## 2021-03-12 NOTE — ANESTHESIA POSTPROCEDURE EVALUATION
[address] [facility] Patient Status:  Inpatient   Age/Gender 54year old male MRN [id number]   Location [address] [facility]. Attending Oral Bossman, [address] [facility] [address] Day # 29 PCP Gerhardt Guile, MD       Anesthesia Post-op Note    Procedure

## 2021-03-12 NOTE — PROGRESS NOTES
[address] [facility] Patient Status:  Inpatient    1965 MRN [id number]   [facility] 3NW-A Attending Angella Rodriguez MD   [facility] Day # 29 PCP Erika Tijerina MD     Pulm / Critical Care Progress Note     S: pt states he 188 lb (85.3 kg)  01/04/21 : 203 lb 3.2 oz (92.2 kg)  12/14/20 : 200 lb (90.7 kg)       I/O last 3 completed shifts: In: 2228.5 [P.O.:300;  I.V.:1153.5; IV PIGGYBACK:775]  Out: 9607 [Urine:4350; Drains:235; YDYVK:8340]  I/O this shift:  In: 115 [I.V.:115] high dose steroids  3. Sarcoidosis - followed by Dr. Mika Kendall at [facility], previously was on plaquenil. CT scans with adenopathy and groundglass opacities.  Bronch/EBUS done 2/16--lymph node bx limited, cultures negative   -continue steroids/cellcept as ab

## 2021-03-12 NOTE — PROGRESS NOTES
[facility]  Report of Inpatient Ostomy Progress     Erla Ear Patient Status:  Inpatient    1965 MRN [id number]   [facility] 3NW-A 325/325-A Attending Cyntha Leventhal, MD   Hosp Day # 29 PCP Graham Herrera MD

## 2021-03-12 NOTE — DIETARY NOTE
Steve received to order TPN, no central access at this time, ordered to infuse peripherally.     PPN order for tonight to provide: 500 dextrose calories, 330 lipid calories, 31% li

## 2021-03-12 NOTE — PLAN OF CARE
Patient alert and oriented, but confused and forgetful at times overnight. Can be fidgety, and attempts to pull at lines, placed a baby monitor in patients room. Pain controled with scheduled medications, and fentanyl drip. No nausea.  Kept NPO after midnig

## 2021-03-12 NOTE — CONSULTS
PHYSICAL MEDICINE AND REHABILITATION CONSULTATION       Location [facility] 3NW-A Attending Nathanael Diane MD   [facility] Day # 29 PCP Daquan Hernandes MD     Patient Identification  Kimmy Pack is a 54year old male.   :  1965  Admit Date: tpn, , Intravenous, Continuous TPN  [START ON 3/13/2021] amLODIPine Besylate (NORVASC) tab 5 mg, 5 mg, Oral, Daily  potassium phosphate dibasic 15 mmol in sodium chloride 0.9% 250 mL IVPB, 15 mmol, Intravenous, Once  [COMPLETED] potassium phosphate dibasic Oral, Once PRN  [] Atropine Sulfate 0.1 MG/ML injection 0.5 mg, 0.5 mg, Intravenous, PRN  [] Labetalol HCl (TRANDATE) injection 5 mg, 5 mg, Intravenous, Q5 Min PRN  [] Midazolam HCl (VERSED) 2 MG/2ML injection 1 mg, 1 mg, Intravenous, Once  Sulfamethoxazole-TMP DS (BACTRIM DS) 800-160 MG per tab 1 tablet, 1 tablet, Oral, Three Times Weekly  [COMPLETED] ALPRAZolam (XANAX) tab 0.25 mg, 0.25 mg, Oral, Once  [COMPLETED] iohexol (OMNIPAQUE) 350 MG/ML injection 100 mL, 100 mL, Intravenous, ON injection 0.5 mg, 0.5 mg, Intravenous, Once  [COMPLETED] ondansetron HCl (ZOFRAN) injection 4 mg, 4 mg, Intravenous, Once  [COMPLETED] Potassium Chloride ER (K-DUR M20) CR tab 40 mEq, 40 mEq, Oral, Once  [] 0.9% NaCl infusion, , Intravenous, Continu MD at [facility] 39 N/A 3/6/2021    Performed by Tilmon Bence, MD at [facility] ENDOSCOPY   • NEEDLE BIOPSY LIVER     • OTHER      biopsies   • OTHER SURGICAL HISTORY      septoplasty   • OTHER SURGICAL HISTORY      kidney stones Frequency of Communication with Friends and Family:       Frequency of Social Gatherings with Friends and Family:       Attends Baptism Services:       Active Member of Clubs or Organizations:       Attends Club or Organization Meetings:       [address] 03/12/2021    CREATSERUM 0.41 03/12/2021    CREATSERUM 0.41 03/12/2021    BUN 9 03/12/2021    BUN 9 03/12/2021     03/12/2021     03/12/2021    K 3.8 03/12/2021    K 3.8 03/12/2021     03/12/2021     03/12/2021    CO2 23.0 03/12/202 prevention. Advanced directives reviewed and in chart. FULL CODE    Thank you for allowing me to participate in the care of this patient.      Catalina Stanley MD, FAAPM&R

## 2021-03-12 NOTE — CM/SW NOTE
Care Progression Note:  Active Acute Medical Issue:   Chest pain of uncertain etiology, pancreatitis, UGI bleed  Sarcoid, C diff toxic megacolon with bowel perf,  Urgent decompression on 3/6, to OR on 3/7  Short stay in ICU on vent d/t resp failure d/t tristen

## 2021-03-13 ENCOUNTER — APPOINTMENT (OUTPATIENT)
Dept: CT IMAGING | Facility: HOSPITAL | Age: 56
DRG: 981 | End: 2021-03-13
Attending: NURSE PRACTITIONER
Payer: COMMERCIAL

## 2021-03-13 PROCEDURE — 99232 SBSQ HOSP IP/OBS MODERATE 35: CPT | Performed by: INTERNAL MEDICINE

## 2021-03-13 PROCEDURE — 74177 CT ABD & PELVIS W/CONTRAST: CPT | Performed by: NURSE PRACTITIONER

## 2021-03-13 RX ORDER — METHYLPREDNISOLONE SODIUM SUCCINATE 40 MG/ML
40 INJECTION, POWDER, LYOPHILIZED, FOR SOLUTION INTRAMUSCULAR; INTRAVENOUS EVERY 12 HOURS
Status: DISCONTINUED | OUTPATIENT
Start: 2021-03-13 | End: 2021-03-16

## 2021-03-13 RX ORDER — OXYCODONE HYDROCHLORIDE 10 MG/1
10 TABLET ORAL EVERY 4 HOURS
Status: DISCONTINUED | OUTPATIENT
Start: 2021-03-13 | End: 2021-03-22

## 2021-03-13 RX ORDER — OXYCODONE HYDROCHLORIDE 10 MG/1
10 TABLET ORAL EVERY 4 HOURS PRN
Status: DISCONTINUED | OUTPATIENT
Start: 2021-03-13 | End: 2021-03-13

## 2021-03-13 RX ORDER — HALOPERIDOL 5 MG/ML
1 INJECTION INTRAMUSCULAR EVERY 6 HOURS PRN
Status: DISCONTINUED | OUTPATIENT
Start: 2021-03-13 | End: 2021-04-01

## 2021-03-13 NOTE — PLAN OF CARE
Received call from IR stating that I should call vascular  access team for PICC placed. PICC nurse called,phone to to voice mail. Paged placed waiting for call back.

## 2021-03-13 NOTE — PROGRESS NOTES
[facility]  Progress Note     Brianna Ramirez Patient Status:  Observation    1965 MRN [id number]   [facility] 8NE-A Attending Dr. Lakesha Mills # 34 PCP Justina Evans MD     Chief Complaint: Chest pain, SOB    S: P 7. 9* 7.5*  7.5* 7.7*   ALB 1.8*  --  1.6* 1.7*    136 133*  133* 134*   K 3.9 3.7 3.8  3.8 4.0   * 108 104  104 105   CO2 25.0 22.0 23.0  23.0 24.0   ALKPHO 38*  --  73 88   AST 14*  --  14* 4*   ALT 27  --  25 26   BILT 0.4  --  0.3 0.4   TP 3 for TPN  2. Encourage PO intake  6. Sarcoidosis  1. treated for >3 weeks for sarcoid flare with immunosuppresants and high dose steroids, trial of colchicine w/o improvement. 2. S/p bronch, BAL and EBUS 2/16. Negative for malignancy. Cx so far negative.

## 2021-03-13 NOTE — PLAN OF CARE
Pt is alert this  morning, does not appear confused. Incision cdi. J.p drains with ss drainage. Ileostomy with liquid brown stool. Great output from rainey. Tolerating fl diet, will advance to soft. Poc updated, pt verbalized understanding.   Problem: CARDIO

## 2021-03-13 NOTE — CM/SW NOTE
PMR consult completed, recommending acute rehab. Referrals sent via aidin for facilities to review clinical status and in network status. Will review with patient on Monday.     Esteban Mtz RN,   Phone [phone]

## 2021-03-13 NOTE — PROGRESS NOTES
Gastroenterology Progress Note  Patient Name: Destin Snell  Chief Complaint: Epigastric abdominal pain  S: The patient reports that his pain is better controlled today, with the PCA, but persists.   No n/v.    O: /79 (BP Location: Left arm)   Puls patient also had abdominal distention, and eventually had to undergo decompressive colonoscopy, which then lead to a diagnosis of c.diff.   He then revealed evidence of a colonic perforation from a megacolon, for which he underwent total colectomy with end-

## 2021-03-13 NOTE — RESPIRATORY THERAPY NOTE
PT STATED HE DOESN'T USE CPAP REGULARLY  AT HOME; AND REFUSES TO USE ONE HERE .  ON JEANETTE PROTOCOL.

## 2021-03-13 NOTE — DIETARY NOTE
Mariestr 14 FOLLOW UP    Tyler Moore   Pt continues to have no central access at this time, ordered to infuse peripherally.     PPN order for tonight to provide: 500 dextrose calories, 330 lipid calories, 31% lipids, 60gr

## 2021-03-13 NOTE — PHYSICAL THERAPY NOTE
PHYSICAL THERAPY TREATMENT NOTE - INPATIENT    Room Number: 325/325-A     Session: 4  Number of Visits to Meet Established Goals: 6    Presenting Problem: s/p total abdoinal colectomy/ileostomy 3/7    Problem List  Principal Problem:    Chest pain of unce BIOPSY LIVER     • OTHER      biopsies   • OTHER SURGICAL HISTORY      septoplasty   • OTHER SURGICAL HISTORY      kidney stones   • TOTAL ABDOMINAL COLECTOMY, END ILEOSTOMY N/A 3/7/2021    Performed by Andrea Del Real MD at [address] [place] [address] Provided: Pt received supine in bed w/ sister at bedside. Pt required max encouragement for OOB mobility d/t pt stated fatigued and in pain. Writer educated the importance on mobility, pt agreeable to transferring to chair.      Writer BP checked on L arm i may achieve highest functional independence/return to baseline. DISCHARGE RECOMMENDATIONS  PT Discharge Recommendations: Acute rehabilitation     PLAN  PT Treatment Plan: Bed mobility; Body mechanics; Endurance; Energy conservation;Patient education; Demarco Gibson

## 2021-03-13 NOTE — PLAN OF CARE
Patient alert and oriented for most part. Around midnight, after patient received ativan, he was hallucinating, confused, fidgeting with lines and things around him, patient attempting to get out of bed independently.  Patient was reoriented multiple times,

## 2021-03-13 NOTE — PROGRESS NOTES
[facility]  Progress Note    Tami Menezes Patient Status:  Inpatient    1965 MRN [id number]   [facility] 3NW-A Attending Tammy Westbrook MD   Hosp Day # 34 PCP Duke Bill MD     Subjective:  Patient complains of weakne --  4.7* 5.0*         Chief Complaint:     Status post subtotal colectomy with end ileostomy for C. difficile induced toxic megacolon      Assessment/Plan:   Patient continues to experience severe anorexia. Patient is on TPN. Albumin today 1.7.   Patient

## 2021-03-13 NOTE — PROGRESS NOTES
[address] [facility] Patient Status:  Inpatient    1965 MRN [id number]   [facility] 3NW-A Attending Nathanael Diane MD   [facility] Day # 34 PCP Daquan Hernandes MD     Pulm / Critical Care Progress Note     S: pt states lalito : 188 lb (85.3 kg)  01/04/21 : 203 lb 3.2 oz (92.2 kg)  12/14/20 : 200 lb (90.7 kg)       I/O last 3 completed shifts:   In: 182 [I.V.:182]  Out: 7680 [Urine:4575; Drains:155; FDFWE:3302]  I/O this shift:  In: 28.5 [I.V.:28.5]  Out: 1960 [Urine:1700; Drains for high dose steroids   - ppi while on high dose steroids  3. Sarcoidosis - followed by Dr. Tania Lai at [facility], previously was on plaquenil. CT scans with adenopathy and groundglass opacities.  Bronch/EBUS done 2/16--lymph node bx limited, cultures nega

## 2021-03-13 NOTE — PROGRESS NOTES
Acute Pain Service    PCA Follow-up Note    Indication: Post-Surgical.      SUBJECTIVE:        OBJECTIVE:    Pain Score:    \"Medium\"    Patient is comfortable.      Side Effects: Confusion overnight after Ativan was given    Vital signs:     /78

## 2021-03-14 ENCOUNTER — APPOINTMENT (OUTPATIENT)
Dept: INTERVENTIONAL RADIOLOGY/VASCULAR | Facility: HOSPITAL | Age: 56
DRG: 981 | End: 2021-03-14
Attending: INTERNAL MEDICINE
Payer: COMMERCIAL

## 2021-03-14 ENCOUNTER — APPOINTMENT (OUTPATIENT)
Dept: GENERAL RADIOLOGY | Facility: HOSPITAL | Age: 56
DRG: 981 | End: 2021-03-14
Attending: INTERNAL MEDICINE
Payer: COMMERCIAL

## 2021-03-14 PROCEDURE — 71045 X-RAY EXAM CHEST 1 VIEW: CPT | Performed by: INTERNAL MEDICINE

## 2021-03-14 PROCEDURE — 99232 SBSQ HOSP IP/OBS MODERATE 35: CPT | Performed by: INTERNAL MEDICINE

## 2021-03-14 PROCEDURE — 02HV33Z INSERTION OF INFUSION DEVICE INTO SUPERIOR VENA CAVA, PERCUTANEOUS APPROACH: ICD-10-PCS | Performed by: RADIOLOGY

## 2021-03-14 RX ORDER — LIDOCAINE HYDROCHLORIDE 10 MG/ML
INJECTION, SOLUTION INFILTRATION; PERINEURAL
Status: COMPLETED
Start: 2021-03-14 | End: 2021-03-14

## 2021-03-14 RX ORDER — HYDROMORPHONE HYDROCHLORIDE 1 MG/ML
0.5 INJECTION, SOLUTION INTRAMUSCULAR; INTRAVENOUS; SUBCUTANEOUS EVERY 2 HOUR PRN
Status: DISCONTINUED | OUTPATIENT
Start: 2021-03-14 | End: 2021-03-29

## 2021-03-14 NOTE — PROGRESS NOTES
[facility] SPECIAL[facility]  Progress Note    Alanis Rod Patient Status:  Inpatient    1965 MRN [id number]   [facility] 3NW-A Attending Niki Carpio MD   Hosp Day # 27 PCP Zion Lang MD     Subjective:  Patient is resting in bed. ileostomy for C. difficile induced toxic megacolon  CT A/P yesterday-findings concerning of pancreatic necrosis  Leukocytosis- 29.2      Assessment/Plan:      1. The patient had a PICC line placed. Will initiate TPN. 2. The patient will be n.p.o.   He may

## 2021-03-14 NOTE — PROGRESS NOTES
[address] [facility] Patient Status:  Inpatient    1965 MRN [id number]   [facility] 3NW-A Attending Angella Sales MD   [address] Day # 27 PCP Mihaela Ng MD     Pulm / Critical Care Progress Note     S: pt states lalito I/O last 3 completed shifts: In: 2345.3 [P.O.:870; I.V.:650]  Out: 6624 [Urine:5625; Drains:120; LLVTU:9151]  I/O this shift:   In: 829.7 [I.V.:377.4; IV PIGGYBACK:282]  Out: 3929 [Urine:850; Drains:20; Stool:250]     Physical Exam:   General: alert,  high dose steroids   - ppi while on high dose steroids  3. Sarcoidosis - followed by Dr. Alli Garcia at [facility], previously was on plaquenil. CT scans with adenopathy and groundglass opacities.  Bronch/EBUS done 2/16--lymph node bx limited, cultures negative

## 2021-03-14 NOTE — PROGRESS NOTES
[facility]  Progress Note     Ro Bush Patient Status:  Observation    1965 MRN [id number]   [facility] 8NE-A Attending Dr. Barry Mcconnell Day # 27 PCP Eufemia Rodriguez MD     Chief Complaint: Chest pain, SOB    S: P 105 105   CO2 23.0  23.0 24.0 26.0   ALKPHO 73 88 96   AST 14* 4* 14*   ALT 25 26 23   BILT 0.3 0.4 0.3   TP 4.7* 5.0* 5.1*     Estimated Creatinine Clearance: 252.3 mL/min (A) (based on SCr of 0.32 mg/dL (L)).     Recent Labs   Lab 03/07/21  1341 03/14/21 intake  7. Sarcoidosis  1. treated for >3 weeks for sarcoid flare with immunosuppresants and high dose steroids, trial of colchicine w/o improvement. 2. S/p bronch, BAL and EBUS 2/16. Negative for malignancy. Cx so far negative.  Final AFB and fungus cult

## 2021-03-14 NOTE — PLAN OF CARE
Pt a&o x 4. No disorderly thoughts noted. Sitter remains @ bedside. Pt does not remember pulling out iv last night. Pt aware of date, time & situation.      No iv narcs given this shift  States pain controlled with tyl iv & oxy po  Marky precautions co PLANNING  Goal: Discharge to home or other facility with appropriate resources  Description: INTERVENTIONS:  - Identify barriers to discharge w/pt and caregiver  - Include patient/family/discharge partner in discharge planning  - Arrange for needed dischar including rhythm and repeat lab results as appropriate  - Fluid restriction as ordered  - Instruct patient on fluid and nutrition restrictions as appropriate  Outcome: Progressing  Goal: Hemodynamic stability and optimal renal function maintained  Descript Progressing  Goal: Patient/Family Short Term Goal  Description: Patient's Short Term Goal: get some sleep    Interventions:   - cluster care as able  - See additional Care Plan goals for specific interventions  Outcome: Progressing     Problem: Juan M Powell

## 2021-03-14 NOTE — PROGRESS NOTES
Gastroenterology Progress Note  Patient Name: Edna Del Toro  Chief Complaint: Acute necrotizing pancreatitis  S: The patient reports ongoing right upper quadrant abdominal pain, but not as bad. No n/v.   He feels a pressure in the area of the urinary bl Collected: 03/13/21 1204   Order Status: Completed Updated: 03/13/21 1206   Narrative:     PROCEDURE:  CT ABDOMEN+PELVIS (CONTRAST ONLY) (CPT=74177)       COMPARISON:  [facility] , XR, XR ABDOMEN (1 VIEW) (CPT=74018), 3/06/2021, 11:50 AM.  [facility] , MR, MRI ABD  No enlargement or focal lesion.     KIDNEYS:  No mass, obstruction, or calcification.  Scarring in the right upper pole of the kidney.    ADRENALS:  No mass or enlargement.     AORTA/VASCULAR:  No aneurysm or dissection.  The portal vein is patent.  The arceo Dictated by (CST): Eleonora Aleman MD on 3/13/2021 at 11:37 AM       Finalized by (CST): Eleonora Aleman MD on 3/13/2021 at 12:04 PM              Assessment:  This is a patient who presented with abdominal /chest pain felt, initially, to be related to sarcoid

## 2021-03-14 NOTE — DIETARY NOTE
Anahi Perez 29   Per RN notes pt had PICC placed this morning, will order TPN for tonight to be run centrally.      TPN order for tonight to provide: 860 dextrose calories, 450 lipid calories, 27% li

## 2021-03-14 NOTE — PLAN OF CARE
A&Ox1, variable moments of alertness, orientated to person, able to reorientation w/ some time, VSS, lungs clear, diminished in bases, breathing regular on RA, persistent productive cough, abd is soft, non-tender, pt passing gas through ostomy bag, bowel s Problem: DISCHARGE PLANNING  Goal: Discharge to home or other facility with appropriate resources  Description: INTERVENTIONS:  - Identify barriers to discharge w/pt and caregiver  - Include patient/family/discharge partner in discharge Gary Patel electrolyte replacements, including rhythm and repeat lab results as appropriate  - Fluid restriction as ordered  - Instruct patient on fluid and nutrition restrictions as appropriate  Outcome: Progressing  Goal: Hemodynamic stability and optimal renal fun interventions  Outcome: Progressing  Goal: Patient/Family Short Term Goal  Description: Patient's Short Term Goal: get some sleep    Interventions:   - cluster care as able  - See additional Care Plan goals for specific interventions  Outcome: Progressing

## 2021-03-15 PROCEDURE — 99232 SBSQ HOSP IP/OBS MODERATE 35: CPT | Performed by: INTERNAL MEDICINE

## 2021-03-15 NOTE — PROGRESS NOTES
[facility]  Progress Note     Erla Ear Patient Status:  Observation    1965 MRN [id number]   [facility] 8NE-A Attending Dr. Tapia Bis Day # 32 PCP Graham Herrera MD     Chief Complaint: Chest pain, SOB    S: P 0.3   TP 5.0* 5.1* 5.0*     Estimated Creatinine Clearance: 244.7 mL/min (A) (based on SCr of 0.33 mg/dL (L)). Recent Labs   Lab 03/14/21  0520   PTP 13.9   INR 1.04     No results for input(s): TROP, CK in the last 168 hours.      Imaging: Imaging data malignancy. Cx so far negative. Final AFB and fungus cultures so far NGTD  3. PJP negative, bactrim ppx  4. Solumedrol IV per pulm  5. Home Plaquenil discontinued and cellcept started 2/22 with plan to increase to 1000 mg BID slowly  6.  Continue home inhal

## 2021-03-15 NOTE — PLAN OF CARE
Pt had an episode of 10/10 pain once with morning, reported relief after iv pain meds. Notice j.p#2 has thick tan drainage today, fluid sent to lab. Incision cdi. Pt denies nay nausea. Was in chair this morning. Poc updated, pt verbalized understanding.  Ca

## 2021-03-15 NOTE — PROGRESS NOTES
03/15/21 1715   Clinical Encounter Type   Visited With Patient   Continue Visiting Yes   Patient Spiritual Encounters   Spiritual Interventions  initiated relationship with patient, providing emotional support as pt. tearfully shared the past 5

## 2021-03-15 NOTE — PROGRESS NOTES
[facility]  Progress Note    Maurie Dance Patient Status:  Inpatient    1965 MRN [id number]   [facility] 3NW-A Attending Chalino Tyler MD   Hosp Day # 32 PCP Lemuel Burks MD     Subjective:   The patient states that he Hyperlipidemia     Sarcoidosis     Lipoma of torso     Erectile dysfunction, unspecified erectile dysfunction type     Chest pain     Dyspnea, unspecified type     Pain of right hip joint     Leg swelling     Right low back pain     Diverticulosis of large feculent or bilious. Ostomy mucosa healthy with liquid output. Incision intact with staples.     Continue NPO and TPN  Will likely need antidiarrheals once resume Stephon Bolivar MD   [facility]  3/15/2021  9:35 PM

## 2021-03-15 NOTE — PROGRESS NOTES
[facility]  Progress Note     Dwain Dexter Patient Status:  Observation    1965 MRN [id number]   [facility] 8NE-A Attending Dr. Radha Bello Day # 32 PCP Sha Ochoa MD     Chief Complaint: Chest pain, SOB    S: P TP 5.0* 5.1* 5.0*     Estimated Creatinine Clearance: 244.7 mL/min (A) (based on SCr of 0.33 mg/dL (L)). Recent Labs   Lab 03/14/21  0520   PTP 13.9   INR 1.04     No results for input(s): TROP, CK in the last 168 hours.      Imaging: Imaging data revi malignancy. Cx so far negative. Final AFB and fungus cultures so far NGTD  3. PJP negative, bactrim ppx  4. Solumedrol IV per pulm  5. Home Plaquenil discontinued and cellcept started 2/22 with plan to increase to 1000 mg BID slowly  6.  Continue home inhal

## 2021-03-15 NOTE — PHYSICAL THERAPY NOTE
PHYSICAL THERAPY TREATMENT NOTE - INPATIENT    Room Number: 325/325-A     Session: 5   Number of Visits to Meet Established Goals: 6    Presenting Problem: s/p total abdoinal colectomy/ileostomy 3/7    Problem List  Principal Problem:    Chest pain of unc BIOPSY LIVER     • OTHER      biopsies   • OTHER SURGICAL HISTORY      septoplasty   • OTHER SURGICAL HISTORY      kidney stones   • TOTAL ABDOMINAL COLECTOMY, END ILEOSTOMY N/A 3/7/2021    Performed by Yajaira Caputo MD at [address] [place] [address] Shuffle  Stoop/Curb Assistance: Not tested  Comment : dizziness limiting distance    Skilled Therapy Provided: Pt supine in bed on approach for therapy.      Patient instructed in bed mobility training requiring moderate assist for supine to sit, re-educate 3/8/2021, in progress 3/15/2021     Writer: surgical mask, goggles, gown and gloves at all times

## 2021-03-15 NOTE — VASCULAR ACCESS
Consulted to place a PICC line. Pt has had a CVC line placed since VAT was consulted. Per primary RN, K, pt may not no longer need PICC line. Will await RN discussion with MD to place PICC line or Dc consult.     DR Severiano Aye called- per MD will dc PICC line

## 2021-03-15 NOTE — PAYOR COMM NOTE
--------------  CONTINUED STAY REVIEW    Payor: JAD KLINE  Subscriber #:  [id number]  Authorization Number: [id number]    Admit date: 2/12/21  Admit time:  2:21 PM    FAXING CLINICAL UPDATE FOR 3/14/21    3/14/21   Chief Complaint: Chest pain, SOB     S: service  3. Drains x2  4. Zosyn/flagyl IV  5. off vanco per surgery post colectomy and rectal remnant was irrigated. 6. TPN, diet per surgery/GI  7. high output ostomy  2. Chest pain, felt to be more abdominal discomfort  3.  Pancreatic mass with surround Miesha Rodriguez RN      adult 3 in 1 tpn     Date Action Dose Route User    3/14/2021 2206 New Bag (none) Intravenous Julius Dean RN      amLODIPine Besylate (NORVASC) tab 5 mg     Date Action Dose Route User    3/15/2021 0903 Given 5 mg Oral Mpulama Sam Garcia RN      metRONIDAZOLE in NaCl (FLAGYL) IVPB premix 500 mg     Date Action Dose Route User    3/15/2021 0904 New Bag 500 mg Intravenous Melina Hansen RN    3/15/2021 0028 New Bag 500 mg Intravenous (Right Upper Arm) Sanchez,

## 2021-03-15 NOTE — PROGRESS NOTES
Pulmonary Progress Note        NAME: Brianna Ramirez - ROOM: [address] [place] [address] - MRN: [id number] - Age: 54year old - : 1965        Last 24hrs: Had a flare of his chest/abd pain overnight but states that it's better now, 6/10    OBJECTIVE:   21  201 edema    Labs reviewed as noted below        ASSESSMENT/PLAN:  1. Acute respiratory failure - due to surgery and underlying sarcoidosis  -improved  -extubated 3/7  -wean O2 as tolerated, down to room air- O2 prn  2.  Atypical chest pain - ongoing for severa

## 2021-03-15 NOTE — PLAN OF CARE
Pt axo4, pain 8/10 moderate relief with pain meds IV narcs, lungs clear and diminished all fields. Pt has IV TPN in right jugular triple lumen line, pt also has right AC single lumen central line.   Pt mental status is improving orders to discontinue the si with appropriate resources  Description: INTERVENTIONS:  - Identify barriers to discharge w/pt and caregiver  - Include patient/family/discharge partner in discharge planning  - Arrange for needed discharge resources and transportation as appropriate  - Id appropriate  - Fluid restriction as ordered  - Instruct patient on fluid and nutrition restrictions as appropriate  Outcome: Progressing  Goal: Hemodynamic stability and optimal renal function maintained  Description: INTERVENTIONS:  - Monitor labs and ass Goal  Description: Patient's Short Term Goal: get some sleep    Interventions:   - cluster care as able  - See additional Care Plan goals for specific interventions  Outcome: Progressing     Problem: GENITOURINARY - ADULT  Goal: Absence of urinary retentio

## 2021-03-15 NOTE — PROGRESS NOTES
Acute Pain Service  POD#8 s/p subtotal colectomy with end ileostomy for c.difficile inducted toxic megacolon    Patient reports abdominal pain 5/10 at present - states it is generalized across his abdomen but mostly across upper abdomen.  He reports an epis

## 2021-03-15 NOTE — PROGRESS NOTES
[facility] Gastroenterology Progress Note    CC: Acute necrotizing pancreatitis    S: Pt with worsened abd pain this am but better currently.       O: /85 (BP Location: Left arm)   Pulse 90   Temp 98.5 °F (36.9 °C) (Oral)   Resp 18   Ht 5' 8\" (1 7:47 AM  [facility] Gastroenterology

## 2021-03-15 NOTE — CM/SW NOTE
SW spoke with pt's spouse Qiana regarding acute rehab facilities. SW provided choice, and pt's spouse picked [facility]. AVIVA made Minoo at [facility] aware. SW to follow.

## 2021-03-15 NOTE — DIETARY NOTE
[facility]    NUTRITION ASSESSMENT    Pt does not meet malnutrition criteria.     NUTRITION INTERVENTION:    1. RD nutrition Care Plan- Monitor need for ONS (oral nutritional supplements) and Recommend alternative nutrition support if unable to take ad ONS once diet advances and if able to tolerate.  Will monitor and add ONS if not meeting needs by PO once diet advances.     2/23 - Pt continues with good appetite, nursing notes reports Percent Meals Eaten (%): 100 % intake for last meal. No new wt availab per kg)  Protein:  grams protein/day (1.0-1.5 grams protein per kg)  Fluid: ~1 ml/kcal or per MD discretion    NUTRITION DIAGNOSIS/PROBLEM:  Inadequate oral intake related to psychological causes as evidenced by documented/reported insufficient oral

## 2021-03-15 NOTE — DIETARY NOTE
[facility]    NUTRITION ASSESSMENT    Pt does not meet malnutrition criteria.     NUTRITION INTERVENTION:    1. RD nutrition Care Plan- Monitor need for ONS (oral nutritional supplements) and Recommend alternative nutrition support if unable to take ad appetite has decreased and feeling nauseous. Pt is concerned about weight loss and interested in trying ONS once diet advances and if able to tolerate.  Will monitor and add ONS if not meeting needs by PO once diet advances.     2/23 - Pt continues with goo Accumulation: none     NUTRITION PRESCRIPTION: 85 kg   Calories: 6951-0574 calories/day (20-25 calories per kg)  Protein:  grams protein/day (1.0-1.5 grams protein per kg)  Fluid: ~1 ml/kcal or per MD discretion    NUTRITION DIAGNOSIS/PROBLEM:  Joshua Ferrer

## 2021-03-15 NOTE — WOUND PROGRESS NOTE
[facility]  Report of Inpatient Ostomy Progress       Ro Bush Patient Status:  Inpatient    1965 MRN [id number]   [facility] 3NW-A 325/325-A Attending Angelica Leos MD   Hosp Day # 32 PCP Eufemia Rodriguez MD       R HISTORY      kidney stones   • TOTAL ABDOMINAL COLECTOMY, END ILEOSTOMY N/A 3/7/2021    Performed by Chey Dolan MD at [facility] MAIN OR      Social History    Tobacco Use      Smoking status: Current Every Day Smoker        Packs/day: 0.25        Year receptive to education. Stoma barrier ring was applied due to height of stoma. Continue to encourage patient to watch the educational video and read the written material provided earlier. All questions answered at this time. Plan of care discussed with patient

## 2021-03-15 NOTE — OCCUPATIONAL THERAPY NOTE
Attempted to see patient for OT services this pm. Patient occupied with a phone call, politely requesting therapy return later today.  Patient made aware this therapist may be unable to return before end of work day, patient reports understanding and states

## 2021-03-16 PROCEDURE — 99232 SBSQ HOSP IP/OBS MODERATE 35: CPT | Performed by: INTERNAL MEDICINE

## 2021-03-16 RX ORDER — METHYLPREDNISOLONE SODIUM SUCCINATE 40 MG/ML
40 INJECTION, POWDER, LYOPHILIZED, FOR SOLUTION INTRAMUSCULAR; INTRAVENOUS DAILY
Status: DISCONTINUED | OUTPATIENT
Start: 2021-03-17 | End: 2021-03-18

## 2021-03-16 NOTE — CM/SW NOTE
Wilhelm Bumpers from [facility] reports pt will need physiatry re-evaluation 3/17 to determine if pt still qualifies for acute rehab. SW to follow.

## 2021-03-16 NOTE — PROGRESS NOTES
Acute Pain Service  POD#9 s/p subtotal colectomy with end ileostomy for c.difficile inducted toxic megacolon     Patient reports adequate pain control on current medications.     He is on a CLD with TPN and taking his medications     Will continue current

## 2021-03-16 NOTE — PROGRESS NOTES
03/16/21 1644   Clinical Encounter Type   Visited With Patient and family together;Health care provider   Routine Visit Follow-up   Continue Visiting No  (upon request or as needed)   Sacramental Encounters   Sacrament of Sick-Anointing Patient requeste

## 2021-03-16 NOTE — PROGRESS NOTES
03/16/21 1640   Clinical Encounter Type   Visited With Patient and family together;Health care provider   Routine Visit Follow-up   Continue Visiting No  (upon request or as needed)   Sacramental Encounters   Sacrament of Sick-Anointing Patient requeste

## 2021-03-16 NOTE — PLAN OF CARE
PT A/O, 98% ON RA, USING IS , SR/ST WITH ACTIVITY, WALLS DRAINING YELLOW URINE, ILEOSTOMY INTACT WITH WATERY BROWN STOOL, EDYTA X2, LT #1 NO DRAINAGE, RT #2 WITH BROWN DRAINAGE, MIDLINE STAPLES D/I, BLAYNE, TOLERATED WATER, TPN INFUSING, SCHEDULED PAIN MED

## 2021-03-16 NOTE — WOUND PROGRESS NOTE
[facility]  Inpatient Ostomy Contact Note    Sienna Jimenez Patient Status:  Inpatient    1965 MRN [id number]   [facility] 3NW-A Attending Bimal Gaytan MD   Hosp Day # 28 PCP Maris Ye MD     Spoke with the  nurse

## 2021-03-16 NOTE — PROGRESS NOTES
[facility]  Progress Note    Sonido Kole Patient Status:  Inpatient    1965 MRN [id number]   [facility] 3NW-A Attending Kailey Kramer MD   Hosp Day # 28 PCP Magy Hollingsworth MD     Subjective:   The patient appears greatly recorded.     Assessment  Patient Active Problem List:     Hyperlipidemia     Sarcoidosis     Lipoma of torso     Erectile dysfunction, unspecified erectile dysfunction type     Chest pain     Dyspnea, unspecified type     Pain of right hip joint     Leg sw reviewed all relevant labs and reports. I agree with the above assessment and plan and again have modified the report to reflect my opinion. Feeling better today. Ambulating more. Taking in some water and ice chips.   Complaining of mild amount of uppe

## 2021-03-16 NOTE — PROGRESS NOTES
Pulmonary Progress Note        NAME: Susannah Wolfe - ROOM: 840/706-L - MRN: [id number] - Age: 54year old - : 1965        Last 24hrs: had a good morning, states that pain is well controlled    OBJECTIVE:   21  0039 21  0510 21 below        ASSESSMENT/PLAN:  1. Acute respiratory failure - due to surgery and underlying sarcoidosis  -improved  -extubated 3/7  -wean O2 as tolerated, down to room air- O2 prn  2.  Atypical chest pain - ongoing for several weeks  - Had been felt to be d

## 2021-03-16 NOTE — PAYOR COMM NOTE
--------------  CONTINUED STAY REVIEW    Payor: JAD KLINE  Subscriber #:  [id number]  Authorization Number: [id number]    Admit date: 2/12/21  Admit time:  2:21 PM    FAXING CLINICAL UPDATE FOR 3/15/21- 3/16/21    3/15/21   Chief Complaint: Chest pain, SO surgery/GI  6. off PCA on scheduled oxy  7. High Ostomy output improved   2. Chest pain, felt to be more abdominal discomfort  3. Pancreatic mass/Necrotizing pancreatitis  1. GI/surgery following  2. elevated CA 19-9 rising. MRI/MRCP noted. Lipase WNL  3. 98.7 °F (37.1 °C), temperature source Oral, resp. rate 18, height 68\", weight 175 lb (79.4 kg), SpO2 95 %. Lungs: No respiratory distress. Cardiac: Regular rate and rhythm.    Abdomen:  Soft, mildy distended, positive generalized tenderness, with no rebo Besylate (NORVASC) tab 5 mg     Date Action Dose Route User    3/16/2021 0854 Given 5 mg Oral Gina House RN      atorvastatin (LIPITOR) tab 20 mg     Date Action Dose Route User    3/15/2021 2231 Given 20 mg Oral Tanner Hagen RN      gabapentin (ABILIO 3/15/2021 2233 New Bag 750 mg Intravenous Stevo Mendez RN      OxyCODONE HCl IR (ROXICODONE) immediate release tab 10 mg     Date Action Dose Route User    3/16/2021 0854 Given 10 mg Oral Zoe Souza RN    3/16/2021 0510 Given 10 mg Oral Sarah Gaytan

## 2021-03-16 NOTE — PROGRESS NOTES
[facility]  Progress Note     Marlene Lee Patient Status:  Observation    1965 MRN [id number]   [facility] 8NE-A Attending Dr. Maegan Mills # 28 PCP Marisue Felty, MD     Chief Complaint: Chest pain, SOB    S: P (based on SCr of 0.26 mg/dL (L)). Recent Labs   Lab 03/14/21  0520   PTP 13.9   INR 1.04     No results for input(s): TROP, CK in the last 168 hours. Imaging: Imaging data reviewed in Epic.     Medications:   • [START ON 3/17/2021] MethylPREDNISolone NGTD  3. PJP negative, bactrim ppx  4. Solumedrol 40mg IV BID per pulm  5. Home Plaquenil discontinued and cellcept started 2/22 with plan to increase to 1000 mg BID slowly  6. Continue home inhalers  7.  Pulm discussed with [address] [place] [address], patient will need f

## 2021-03-16 NOTE — PHYSICAL THERAPY NOTE
PHYSICAL THERAPY TREATMENT NOTE - INPATIENT    Room Number: 325/325-A     Session: 6  Number of Visits to Meet Established Goals: 6    Presenting Problem: s/p total abdoinal colectomy/ileostomy 3/7    Problem List  Principal Problem:    Chest pain of unce [facility] ENDOSCOPY   • FLEXIBLE SIGMOIDOSCOPY N/A 3/6/2021    Performed by Leona High MD at [address] [facility] ENDOSCOPY   • NEEDLE BIOPSY LIVER     • OTHER      biopsies   • OTHER SURGICAL HISTORY      septoplasty   • OTHER SURGICAL HISTORY      kidney stones   • TOTA tested  Comment : dizziness limiting distance    Skilled Therapy Provided: Pt received supine in bed. Pt performed log roll CGA w/ use of bed rail for assist.     Upon sitting EOB, pt actively performed B LAQ and shldr flexion x 10.      Pt performed sit<>s assistance level: supervision-met 3/16/2021      Goal #3 Patient is able to ambulate 150 feet with assist device: walker - rolling at assistance level: supervision- met 3/16/2021       Goal #4 Ascend/descend flight of stairs with rail, supervision   Goal #

## 2021-03-16 NOTE — DIETARY NOTE
[facility]   CLINICAL NUTRITION - TPN FOLLOW UP    Ania Blandon     TPN goal: 1060 dextrose calories,  660 lipid calories, 30% lipids, 120 grams protein in 2000 mL fluid     · Due to nationwide shortage, MVI will be added to TPN on 3/19.       Gauri Loja

## 2021-03-16 NOTE — PROGRESS NOTES
[facility] Gastroenterology Progress Note    CC: Acute necrotizing pancreatitis    S: Pt with improved abd pain today     O: /80 (BP Location: Left arm)   Pulse 81   Temp 98.7 °F (37.1 °C) (Oral)   Resp 18   Ht 5' 8\" (1.727 m)   Wt 175 lb (79.4 Ruth Ivan, 03/16/21, 9:26 AM  [facility] Gastroenterology

## 2021-03-17 PROCEDURE — 99232 SBSQ HOSP IP/OBS MODERATE 35: CPT | Performed by: HOSPITALIST

## 2021-03-17 NOTE — PHYSICAL THERAPY NOTE
PHYSICAL THERAPY TREATMENT NOTE - INPATIENT    Room Number: 325/325-A     Session: 7  Number of Visits to Meet Established Goals: 6    Presenting Problem: s/p total abdoinal colectomy/ileostomy 3/7    Problem List  Principal Problem:    Chest pain of unce Isaac Ramos MD at [place] [address] N/A 3/6/2021    Performed by Lexy Moreno MD at [facility] ENDOSCOPY   • NEEDLE BIOPSY LIVER     • OTHER      biopsies   • OTHER SURGICAL HISTORY      septoplasty   • OTHER SURGICAL HISTORY 100  Assistive Device: Rolling walker  Pattern: Shuffle  Stoop/Curb Assistance: Not tested  Comment : dizziness limiting distance    Skilled Therapy Provided: RN approved session. Pt presents in semi sup, spouse present. Educated pt on log roll.  Sup>sit v level: modified independent      Goal #2 Patient is able to demonstrate transfers Sit to/from Stand at assistance level: supervision-met 3/16/2021      Goal #3 Patient is able to ambulate 150 feet with assist device: walker - rolling at assistance level: s

## 2021-03-17 NOTE — PLAN OF CARE
PT A/O, SLEEPING PART OF NIGHT, GIVEN SCHEDULED PAIN MEDS WITH RELIEF, BUT C/O OF MODERATE THROAT PAIN, TAKING ICE CHIPS, SIPS WITH MEDS, TPN INFUSING, EDYTA # 1 WITH NO DRAINAGE, #2 - 110 BROWN DRAINAGE, ILEOSTOMY WITH WATERY BROWN STOOL, BLOOD DRAWN FROM RT

## 2021-03-17 NOTE — PROGRESS NOTES
[facility] Gastroenterology Progress Note    CC: Acute necrotizing pancreatitis    S: Pt with stable abd pain today     O: /67 (BP Location: Left arm)   Pulse 102   Temp 98.1 °F (36.7 °C) (Oral)   Resp 16   Ht 5' 8\" (1.727 m)   Wt 175 lb 6.4 oz 03/17/21, 10:49 AM  [facility] Gastroenterology

## 2021-03-17 NOTE — PAYOR COMM NOTE
--------------  CONTINUED STAY REVIEW    Payor: JAD KLINE  Subscriber #:  [id number]  Authorization Number: [id number]    Admit date: 2/12/21  Admit time:  2:21 PM    FAXING CLINICAL UPDATE FOR 3/17/21    3/17/21  Chief Complaint: Chest pain, SOB     S: P abdominal discomfort  3. Pancreatic mass/Necrotizing pancreatitis  1. GI/surgery following  2. elevated CA 19-9 rising. Imaging reviewed  3. Pain management as above  4. Leukocytosis, resolved  5. NG trauma noted on EGD  6. Poor oral intake  1.  RIJ in plac Theola Kocher, RN      gabapentin (NEURONTIN) cap 100 mg     Date Action Dose Route User    3/17/2021 0900 Given 100 mg Oral Leonora Fischer RN    3/16/2021 2146 Given 100 mg Oral Wang Hubbard RN    3/16/2021 1700 Given 100 mg Oral BRENDA Sharp 40 mg in Sodium Chloride (PF) 0.9 % 10 mL IV push     Date Action Dose Route User    3/17/2021 0930 Given 40 mg Intravenous Fallon Dimas, RN      Piperacillin Sod-Tazobactam So (ZOSYN) 3.375 g in dextrose 5% 100 mL IVPB-ADDV     Date Action Dose Route User

## 2021-03-17 NOTE — CM/SW NOTE
03/17/21 0900   CM/SW Referral Data   Referral Source Physician   Reason for Referral Discharge planning   Informant Patient   Patient Info   Patient's Mental Status Alert;Oriented     Sw met with pt to discuss dc needs.     Pt is 55 yo male has been her

## 2021-03-17 NOTE — DIETARY NOTE
[facility]   CLINICAL NUTRITION - TPN FOLLOW UP    Fede Vasquez     TPN goal: 1060 dextrose calories,  660 lipid calories, 30% lipids, 120 grams protein in 2200 mL fluid     · Due to nationwide shortage, MVI will be added to TPN on 3/19.       Patsy Rivas

## 2021-03-17 NOTE — PROGRESS NOTES
[facility]  Progress Note    Fede Vasquez Patient Status:  Inpatient    1965 MRN [id number]   [facility] 3NW-A Attending Tammy Brunson MD   Hosp Day # 35 PCP Mariano Baca MD     Subjective:  Patient is resting in bed.   H ALT 17 03/17/2021    AST 11 03/17/2021    BILT 0.2 03/17/2021    ALB 1.6 03/17/2021    TP 5.0 03/17/2021          Assessment:  Patient Active Problem List:     Hyperlipidemia     Sarcoidosis     Lipoma of torso     Erectile dysfunction, unspecified erectil relevant labs and reports. I agree with the above assessment and plan and again have modified the report to reflect my opinion. Patient complaining of sore throat. Tolerating water and ice chips. Abdomen soft. Nondistended.   Midline incision intact

## 2021-03-17 NOTE — PROGRESS NOTES
SUBJECTIVE:    CC: ADL mobility deficits secondary to toxic megacolon with perforated bowel, status post urgent decompression, total abdominal colectomy with end ileostomy    Subjective: Patient seen and examined with his wife, present.   He is on TPN for n 6.4 oz (79.6 kg)   03/16/21 2353 115/68 98.6 °F (37 °C) Oral 94 18 95 % —   03/16/21 1919 115/71 98.9 °F (37.2 °C) Oral 96 18 96 % —       Intake/Output:    Intake/Output Summary (Last 24 hours) at 3/17/2021 1423  Last data filed at 3/17/2021 1040  Gross p inpatient rehab. Estimated length of stay is about 10 to 14 days. Goals of acute rehab or aggressive rehabilitation to improve endurance and strength following prolonged hospitalization.   He has ongoing medical issues that require close medical oversight

## 2021-03-17 NOTE — CM/SW NOTE
ANGELINA did eval and still advise acute rehab. Sundeep spoke to Minoo from [facility]. They had not submitted auth yet but will prepare to do so.  Inquired about [facility] accepting pt on TPN- she states this is case by case and will discuss with their team.    Cory Alcaraz

## 2021-03-17 NOTE — PHYSICAL THERAPY NOTE
IP PT attempted, pt politely refusing stating drain was just pulled. Will re-attempt as schedule permits.

## 2021-03-17 NOTE — PROGRESS NOTES
[facility]  Progress Note     Jim Gallegos Patient Status:  Observation    1965 MRN [id number]   [facility] 8NE-A Attending Dr. Laura Cruz Day # 35 PCP Meme Edwards MD     Chief Complaint: Chest pain, SOB    S: Pt 5.0*     Estimated Creatinine Clearance: 207.1 mL/min (A) (based on SCr of 0.39 mg/dL (L)). Recent Labs   Lab 03/14/21  0520   PTP 13.9   INR 1.04     No results for input(s): TROP, CK in the last 168 hours. Imaging: Imaging data reviewed in Epic. far NGTD  3. PJP negative, bactrim ppx  4. Solumedrol 40mg IV daily per pulm  5. Home Plaquenil discontinued and cellcept started 2/22 with plan to increase to 1000 mg BID slowly  6. Continue home inhalers  7.  Pulm discussed with [address] [place] [address], patient will

## 2021-03-17 NOTE — PROGRESS NOTES
[facility] PULMONARY/CRITICAL CARE    S: Patient complains of sore throat today. He has some mild chest pain which is unchanged. He denies shortness of breath or cough.     Meds:  • MethylPREDNISolone Sodium Succ  40 mg Intravenous Daily   • mycophenolate  750 mg 03/17/21  0547   WBC 29.2* 25.7* 9.8   HGB 10.9* 9.9* 10.1*   .0 314.0 271.0     Recent Labs   Lab 03/15/21  0536 03/16/21  0647 03/17/21  0547   * 134* 135*   K 4.7 4.9 4.5    103 103   CO2 26.0 27.0 26.0   BUN 12 14 14   CREATSERUM 0.3 JAMEE Mcdaniel

## 2021-03-17 NOTE — PLAN OF CARE
Pt A&Ox4, VSS on RA. Diaphoretic at times, afebrile. Tolerating NPO w/ ice chips. Per surgeon ok for pt to have some sips of clears and bites of jello for comfort. Pt c/o throat pain, spray administered.  Pt has productive cough, splinting midline incision Incentive Spirometry  - Assess the need for suctioning and perform as needed  - Assess and instruct to report SOB or any respiratory difficulty  - Respiratory Therapy support as indicated  - Manage/alleviate anxiety  - Monitor for signs/symptoms of CO2 ret pt to monitor pain and request assistance  - Assess pain using appropriate pain scale  - Administer analgesics based on type and severity of pain and evaluate response  - Implement non-pharmacological measures as appropriate and evaluate response  - Consid the patient needs post-hospital services based on physician/LIP order or complex needs related to functional status, cognitive ability or social support system  Outcome: Progressing     Problem: GASTROINTESTINAL - ADULT  Goal: Maintains or returns to basel

## 2021-03-18 PROCEDURE — 99232 SBSQ HOSP IP/OBS MODERATE 35: CPT | Performed by: HOSPITALIST

## 2021-03-18 RX ORDER — TAMSULOSIN HYDROCHLORIDE 0.4 MG/1
0.4 CAPSULE ORAL NIGHTLY
Status: DISCONTINUED | OUTPATIENT
Start: 2021-03-18 | End: 2021-04-01

## 2021-03-18 NOTE — PROGRESS NOTES
Gastroenterology Progress Note  Ro Bush Patient Status:  Inpatient    1965 MRN [id number]   [facility] 3NW-A Attending Alberto Doty MD   Hosp Day # 29 PCP Kamryn Presley MD colectomy. Pt now with post operative nutritional issues and unclear etiology of pancreatitis. Pt with mass seen on prior radiographic examination but on repeat imaging suggested decreasing size of the mass, suggestive of focal necrosis. WBC normalized.  I

## 2021-03-18 NOTE — CM/SW NOTE
Care Progression Note:  Active Acute Medical Issue:   Chest pain of uncertain etiology   pancreatitis, UGI bleed  Sarcoid, C diff toxic megacolon with bowel perf,  Urgent decompression on 3/6, to OR on 3/7  Short stay in ICU on vent d/t resp failure d/t sa

## 2021-03-18 NOTE — PLAN OF CARE
Patient is alert and oriented x4. Patient states he is \"feeling tired\" this morning. Patient is on room air. Lungs sounds clear/ diminished in all lobes. Non-productive cough. Patient c/o sore throat. Chloraseptic spray given.  PO ice chips encouraged to Therapy support as indicated  - Manage/alleviate anxiety  - Monitor for signs/symptoms of CO2 retention  Outcome: Progressing     Problem: METABOLIC/FLUID AND ELECTROLYTES - ADULT  Goal: Electrolytes maintained within normal limits  Description: INTERVENTI response  - Implement non-pharmacological measures as appropriate and evaluate response  - Consider cultural and social influences on pain and pain management  - Manage/alleviate anxiety  - Utilize distraction and/or relaxation techniques  - Monitor for op system  Outcome: Progressing     Problem: GASTROINTESTINAL - ADULT  Goal: Maintains or returns to baseline bowel function  Description: INTERVENTIONS:  - Assess bowel function  - Maintain adequate hydration with IV or PO as ordered and tolerated  - Evaluat

## 2021-03-18 NOTE — CM/SW NOTE
Shelley Aquino from [facility] states they are going to start the auth for Acute Rehab with [employer] today. Sw met with pt and discussed above. We discussed options if pt [employer] denied acute rehab ( peer to peer, [facility] and [facility] ).     Pt resides with his wife

## 2021-03-18 NOTE — PROGRESS NOTES
[facility]  Progress Note     Lo Shepherd Patient Status:  Observation    1965 MRN [id number]   [facility] 8NE-A Attending Dr. Idalmis Ochoa Day # 29 PCP Vvii Au MD     Chief Complaint: Chest pain, SOB    S: Pt Clearance: 323 mL/min (A) (based on SCr of 0.25 mg/dL (L)). Recent Labs   Lab 03/14/21  0520   PTP 13.9   INR 1.04     No results for input(s): TROP, CK in the last 168 hours. Imaging: Imaging data reviewed in Epic.     Medications:   • tamsulosin HC negative, bactrim ppx  4. Solumedrol 40mg IV daily completed per pulm  5. Home Plaquenil discontinued and cellcept started 2/22 with plan to increase to 1000 mg BID slowly  6. inhalers  7.  Pulm discussed with [address] [place] [address], patient will need follow up there

## 2021-03-18 NOTE — OCCUPATIONAL THERAPY NOTE
OCCUPATIONAL THERAPY TREATMENT NOTE - INPATIENT     Room Number: 325/325-A  Session: 3  Number of Visits to Meet Established Goals: 7    Presenting Problem: chest pain, bowel perforation     History related to current admission: Patient is a 56y/o male adm COLONOSCOPY  11/2015    4 small adeonomas, diverticulosis. repeat 2018   • EGD  11/2015     schatzki's ring, non obstructive. Erosive esophagitis, LA grad 1 (no barretts on path).  Gastritis, no hpylori on path   • ENDOBRONCHIAL ULTRASOUND (EBUS) N/A 2/16/2 tolerance. Sit to stand SBA via RW, seated rest needed prior to initiation. Ambulates CGA to bathroom via RW. Engaged in grooming task stance level. Pt able to tolerate approximately 4 minute static stand.  Therapist terminated task secondary to tachycardia

## 2021-03-18 NOTE — CM/SW NOTE
[facility] informed sw that TPN is too hard for them to manage and prefers that he be on all orals at [place]. [facility]IV[facility] updated [employer].     Bradley Basilio LCSW  /Discharge Planner  [phone]

## 2021-03-18 NOTE — DIETARY NOTE
[facility]    NUTRITION ASSESSMENT    Pt does not meet malnutrition criteria.     NUTRITION INTERVENTION:    1. RD nutrition Care Plan- Monitor need for ONS (oral nutritional supplements) and Recommend alternative nutrition support if unable to take ad advanced to CLD. Will continue to monitor for tolerance and need for ONS once diet advances.     3/2- Pt NPO. BM this morning. Pt reports appetite has decreased and feeling nauseous.  Pt is concerned about weight loss and interested in trying ONS once diet FOOD/NUTRITION RELATED HISTORY:   Appetite: Poor  Intake: NPO  Intake Meeting Needs: No  Food Allergies: No  Cultural/Ethnic/Mu-ism Preferences Addresses: Yes    GI SYSTEM REVIEW: abdominal pain    NUTRITION RELATED PHYSICAL FINDINGS:     1. Body Fa

## 2021-03-18 NOTE — PROGRESS NOTES
[facility]  Progress Note    Brianna Ashley Patient Status:  Inpatient    1965 MRN [id number]   [facility] 3NW-A Attending Jt Perez MD   Hosp Day # 29 PCP Justina Evans MD     Subjective:   The patient reports that his dysfunction, unspecified erectile dysfunction type     Chest pain     Dyspnea, unspecified type     Pain of right hip joint     Leg swelling     Right low back pain     Diverticulosis of large intestine without perforation or abscess without bleeding     S

## 2021-03-18 NOTE — CM/SW NOTE
Discussed pt with Gen Surgery PA Jimi Robles re: ? plan for TPN at dc. Pt will be here until sometime next week. Will possibly need TPN at dc. Sw asked [facility] to check if they can accept pt on TPN.   Will update Jimi Robles once receive response from Leonor Russell.

## 2021-03-18 NOTE — PHYSICAL THERAPY NOTE
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 N/A 6/1/2020    Performed by Marco A Pino MD bedclothes, sheets and blankets)?: A Little   -   Sitting down on and standing up from a chair with arms (e.g., wheelchair, bedside commode, etc.): A Little   -   Moving from lying on back to sitting on the side of the bed?: A Little   How much help from a session/findings; All patient questions and concerns addressed;SCDs in place    ASSESSMENT   Pt cont to make progress, however, cont to present with dec strength, balance, activity tolerance.   Patient will benefit from continued inpatient physical therapy t

## 2021-03-18 NOTE — PROGRESS NOTES
Pulmonary Progress Note        NAME: Sai Hayes - ROOM: 538171-W - MRN: [id number] - Age: 54year old - : 1965        Last 24hrs: No events overnight, pain is stable today, still noting some chest/abd pain    OBJECTIVE:   21  0100  soft, non-tender; bowel sounds normal; no masses,  no organomegaly  Extremities: extremities normal, atraumatic, no cyanosis or edema    Labs reviewed as noted below        ASSESSMENT/PLAN:  1.  Acute respiratory failure - was intubated in setting of perfor

## 2021-03-18 NOTE — PLAN OF CARE
PT A/O, CALM, COOPERATIVE, 96% ON RA, NP COUGH, USING IS, SR/ST, DRESSING OVER LT EDYTA SITE D/I, RT EDYTA DRAINING BROWN DRAINAGE, MIDLINE STAPLES BLAYNE, REFUSED SCDS, WALLS DRAINING YELLOW URINE, ILEOSTOMY WITH WATERY BROWN STOOL, C/O OF ABDOMINAL AND THROAT LOBO

## 2021-03-19 PROCEDURE — 99232 SBSQ HOSP IP/OBS MODERATE 35: CPT | Performed by: HOSPITALIST

## 2021-03-19 RX ORDER — ONDANSETRON 2 MG/ML
4 INJECTION INTRAMUSCULAR; INTRAVENOUS EVERY 6 HOURS PRN
Status: DISCONTINUED | OUTPATIENT
Start: 2021-03-19 | End: 2021-04-01

## 2021-03-19 RX ORDER — PANTOPRAZOLE SODIUM 40 MG/1
40 TABLET, DELAYED RELEASE ORAL
Status: DISCONTINUED | OUTPATIENT
Start: 2021-03-19 | End: 2021-04-01

## 2021-03-19 RX ORDER — PROCHLORPERAZINE EDISYLATE 5 MG/ML
10 INJECTION INTRAMUSCULAR; INTRAVENOUS EVERY 6 HOURS PRN
Status: DISCONTINUED | OUTPATIENT
Start: 2021-03-19 | End: 2021-04-01

## 2021-03-19 RX ORDER — MAGNESIUM OXIDE 400 MG (241.3 MG MAGNESIUM) TABLET
400 TABLET ONCE
Status: COMPLETED | OUTPATIENT
Start: 2021-03-19 | End: 2021-03-19

## 2021-03-19 RX ORDER — MYCOPHENOLATE MOFETIL 250 MG/1
750 CAPSULE ORAL
Status: DISCONTINUED | OUTPATIENT
Start: 2021-03-19 | End: 2021-03-25

## 2021-03-19 NOTE — OCCUPATIONAL THERAPY NOTE
OCCUPATIONAL THERAPY TREATMENT NOTE - INPATIENT     Room Number: 325/325-A  Session: 4  Number of Visits to Meet Established Goals: 7    Presenting Problem: chest pain, bowel perforation     History related to current admission: Patient is a 56y/o male adm COLONOSCOPY  11/2015    4 small adeonomas, diverticulosis. repeat 2018   • EGD  11/2015     schatzki's ring, non obstructive. Erosive esophagitis, LA grad 1 (no barretts on path).  Gastritis, no hpylori on path   • ENDOBRONCHIAL ULTRASOUND (EBUS) N/A 2/16/2 Supervision  Sit to Stand: Supervision    Skilled Therapy Provided: Pt seen for skilled OT treatment session this AM focused on safety with ADLs and functional mobility to return to PLOF.  Reinforced education re: abdominal precautions, log rolling, energy dressing w/ supervision and with adaptive equipment PRN  Patient will perform toileting with supervision and with adaptive equipment PRN.  -- 3/19 utilizes urinal with SB, continue for full toileting tasks     Functional Transfer Goals:  Patient will transf

## 2021-03-19 NOTE — PLAN OF CARE
Pt a&o x 4. Cooperative w/ care. OT working w/ patient this am  Pt reported nausea this am.   Relieved after reglan & zosyn  Appetite poor. Ate 2-3 bites of cld this am  Tpn maintained, as ordered  Ileostomy draining soft brown stool. Appliance cdi. INTERVENTIONS:  - Assess pt frequently for physical needs  - Identify cognitive and physical deficits and behaviors that affect risk of falls.   - [place] fall precautions as indicated by assessment.  - Educate pt/family on patient safety including physic for suctioning and perform as needed  - Assess and instruct to report SOB or any respiratory difficulty  - Respiratory Therapy support as indicated  - Manage/alleviate anxiety  - Monitor for signs/symptoms of CO2 retention  Outcome: Progressing     Problem consumed  - Identify factors contributing to decreased intake, treat as appropriate  - Assist with meals as needed  - Monitor I&O, WT and lab values  - Obtain nutritional consult as needed  - Optimize oral hygiene and moisture  - Encourage food from home;

## 2021-03-19 NOTE — PROGRESS NOTES
[facility]  Progress Note     Luda Pickett Patient Status:  Observation    1965 MRN [id number]   [facility] 8NE-A Attending Dr. Zaira Montaño Day # 28 PCP Megan Salazar MD     Chief Complaint: Chest pain, SOB    S: Pt 1.04     No results for input(s): TROP, CK in the last 168 hours. Imaging: Imaging data reviewed in Epic.     Medications:   • mycophenolate mofetil  750 mg Oral 2 times per day   • Pantoprazole Sodium  40 mg Oral QAM AC   • tamsulosin HCl  0.4 mg Oral discussed with [facility], patient will need follow up there  8. Hypertension, stable  1. Norvasc/lisinopril   9. Hyperlipidemia  1. Statin and fenofibrate  10. GERD  11. JEANETTE on CPAP  12. Anemia, partly dilutional, stable  13.  Urinary retention, resolved

## 2021-03-19 NOTE — PROGRESS NOTES
Pulmonary Progress Note        NAME: Luda Pickett - ROOM: [facility]884- - MRN: [id number] - Age: 54year old - : 1965        Last 24hrs: No events overnight, noting increased nausea today, denies vomitting, pain is stable    OBJECTIVE:   21 organomegaly  Extremities: extremities normal, atraumatic, no cyanosis or edema    Labs reviewed as noted below        ASSESSMENT/PLAN:  1.  Acute respiratory failure - was intubated in setting of perforated bowel and C Diff / toxic megacolon requiring surg normal cephalic

## 2021-03-19 NOTE — DIETARY NOTE
Mari[facility] 14 FOLLOW UP    Ismael [place]     TPN goal: 1060 dextrose calories,  660 lipid calories, 30% lipids, 120 grams protein in 1800 mL fluid     · Due to nationwide shortage, MVI added to TPN today.       Intake/Output

## 2021-03-19 NOTE — PLAN OF CARE
A&Ox4. VSS. RA. . Telemetry: NSR--ST at times. No cardiac symptoms. GI: Abdomen soft, nondistended. Passing gas and stool via ostomy. Isolation d/t C-diff. Denies nausea. : Voids.   Pain controlled with PRN and scheduled pain medications  Up with s indicated  - Manage/alleviate anxiety  - Monitor for signs/symptoms of CO2 retention  Outcome: Progressing     Problem: METABOLIC/FLUID AND ELECTROLYTES - ADULT  Goal: Electrolytes maintained within normal limits  Description: INTERVENTIONS:  - Monitor lab Implement non-pharmacological measures as appropriate and evaluate response  - Consider cultural and social influences on pain and pain management  - Manage/alleviate anxiety  - Utilize distraction and/or relaxation techniques  - Monitor for opioid side ef Progressing     Problem: GASTROINTESTINAL - ADULT  Goal: Maintains or returns to baseline bowel function  Description: INTERVENTIONS:  - Assess bowel function  - Maintain adequate hydration with IV or PO as ordered and tolerated  - Evaluate effectiveness o

## 2021-03-19 NOTE — PROGRESS NOTES
Gastroenterology Progress Note  Cayetano Virk Patient Status:  Inpatient    1965 MRN [id number]   [facility] 3NW-A Attending Bhavin Koehler MD   Hosp Day # 28 PCP Daisha Llanos MD imaging suggested decreasing size of the mass, suggestive of focal necrosis. WBC normalized. Improving abd pain overall. Ostomy with OP and pt started on clears. Tolerating well yday, but with some nausea today. Plan:   1.  Continue diet/TPN per general

## 2021-03-19 NOTE — PHYSICAL THERAPY NOTE
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 N/A 3/6/2021    Performed by John Bear MD at [address] [facility] ENDOSCOPY   • NEEDLE BIOPSY LIVER     • OTHER      biopsies   • OTHER SURGICAL HISTORY      septoplasty   • OTHER SURGICAL HISTORY Device: Rolling walker  Pattern: Within Functional Limits  Stoop/Curb Assistance: Not tested  Comment :  (N/A)    Skilled Therapy Provided: Pt willing to participate in session, working towards increased functional mobility and independence.     Discussed G training;Balance training  Rehab Potential : Good  Frequency (Obs): 3-5x/week    CURRENT GOALS     Goal #1       Patient is able to demonstrate supine - sit EOB @ level: modified independent      Goal #2 Patient is able to demonstrate transfers Sit to/from

## 2021-03-19 NOTE — PROGRESS NOTES
[facility]  Progress Note    Lo Shepherd Patient Status:  Inpatient    1965 MRN [id number]   [facility] 3NW-A Attending Sofia Gaitan MD   Hosp Day # 28 PCP Vivi Au MD     Subjective:  Patient is resting in bed.   H 121 03/19/2021    CA 8.0 03/19/2021    ALKPHO 82 03/19/2021    ALT 15 03/19/2021    AST 13 03/19/2021    BILT 0.2 03/19/2021    ALB 1.6 03/19/2021    TP 5.0 03/19/2021          Assessment:  Patient Active Problem List:     Hyperlipidemia     Sarcoidosis AM    Addendum:  Dr. Kwaku Bennett    I have reviewed the above note and evaluation by the physician assistant. I agree with her physical exam and the data listed in the report, and I have made any relevant changes in editing her note.  I have personally

## 2021-03-20 PROCEDURE — 99232 SBSQ HOSP IP/OBS MODERATE 35: CPT | Performed by: HOSPITALIST

## 2021-03-20 RX ORDER — MAGNESIUM OXIDE 400 MG (241.3 MG MAGNESIUM) TABLET
400 TABLET ONCE
Status: COMPLETED | OUTPATIENT
Start: 2021-03-20 | End: 2021-03-20

## 2021-03-20 NOTE — PROGRESS NOTES
[facility]  Progress Note    Ravin Del Cid Patient Status:  Inpatient    1965 MRN [id number]   [facility] 3NW-A Attending Thien Banks MD   Hosp Day # 39 PCP Ramírez Atkins MD     Subjective:  No new complaints.  He denies shoulder     Biceps tendonitis, left     Rotator cuff tendonitis, left     Eye muscle twitches     Acute nonintractable headache     Myalgia     Arthralgia     Wheezing     Cough     Chest pain of uncertain etiology     Body aches     Gastroesophageal refl care  4. Increase activity as able  5. DVT prophylaxis  6. GI prophylaxis     This note was initiated by America Ramirez PA-C. The PA saw the patient in conjunction with me. The PA performed a history, exam, and developed the assessment and plan.  I agree wi

## 2021-03-20 NOTE — PLAN OF CARE
Pt a&o x 4. Cheerful & cooperative w/ care  Up with sba. Reports endurance improving since yesterdasy  Marky precautions maintained.  . Room air. IS @ bedside.   Tele - nsr,.   Ileostomy producing soft brown stool  Voiding  Surgical sites cdi, w by assessment.  - Educate pt/family on patient safety including physical limitations  - Instruct pt to call for assistance with activity based on assessment  - Modify environment to reduce risk of injury  - Provide assistive devices as appropriate  - Consi for signs/symptoms of CO2 retention  Outcome: Progressing     Problem: METABOLIC/FLUID AND ELECTROLYTES - ADULT  Goal: Electrolytes maintained within normal limits  Description: INTERVENTIONS:  - Monitor labs and rhythm and assess patient for signs and sym needed  - Optimize oral hygiene and moisture  - Encourage food from home; allow for food preferences  - Enhance eating environment  Outcome: Progressing     Problem: Patient/Family Goals  Goal: Patient/Family Long Term Goal  Description: Patient's Long Ter

## 2021-03-20 NOTE — PROGRESS NOTES
Pulmonary Progress Note     Assessment / Plan:  1. Acute respiratory failure - was intubated in setting of perforated bowel and C Diff / toxic megacolon requiring surgery, as well as underlying sarcoidosis.  Improved, extubated 3/7.  -O2 as needed - current (77.7 kg)    Height:         Physical Exam:  General: no apparent distress, conversant  Skin: no rash, ulcers or subcutaneous nodules  Eyes: anicteric sclerae, moist conjunctivae  Head, ears, nose, throat: atraumatic, oropharynx clear with moist mucous mem

## 2021-03-20 NOTE — PROGRESS NOTES
Gastroenterology Progress Note  Patient Name: Andrea Olmedo  Chief Complaint: pancreatitis, C diff, megacolon  S: Pt is tolerating CLD and denies vomiting. He reports mild epigastric pain. He is having stool output from ostomy.    O: /73 (BP Locati ordered. Would advance diet as tolerated slowly to low fat/low dairy. Would need nutritionist to see if patient able to maintain adequate calories to see if TPN could be weaned. 2. Continue pain control per pain service; antiemetics as needed   3.  Will

## 2021-03-20 NOTE — DIETARY NOTE
[address] [facility] [address] - TPN FOLLOW UP    Budd Route     TPN goal: 530 dextrose calories,  330 lipid calories, 30% lipids, 60 grams protein in 1800 mL fluid total of 1,100 kcal 50% of needs. · Per surgery start weaning TPN.    · Pt po

## 2021-03-20 NOTE — PROGRESS NOTES
[facility]  Progress Note     Ro Bush Patient Status:  Observation    1965 MRN [id number]   [facility] 8NE-A Attending Dr. Roque Harada Day # 39 PCP Eufemia Rodriguez MD     Chief Complaint: Chest pain, SOB    S: Pt displayed. Estimated Creatinine Clearance: 237.5 mL/min (A) (based on SCr of 0.34 mg/dL (L)). Recent Labs   Lab 03/14/21  0520   PTP 13.9   INR 1.04     No results for input(s): TROP, CK in the last 168 hours.      Imaging: Imaging data reviewed in E increase to 1000 mg BID slowly  6. inhalers  7. Pulm discussed with [address] [place] [address], patient will need follow up there  4. Hypertension, stable  1. Norvasc/lisinopril   5. Hyperlipidemia  1. Statin and fenofibrate  6. GERD  7. JEANETTE on CPAP  8.  Anemia, partly di

## 2021-03-21 PROCEDURE — 99232 SBSQ HOSP IP/OBS MODERATE 35: CPT | Performed by: HOSPITALIST

## 2021-03-21 RX ORDER — DEXAMETHASONE 4 MG/1
4 TABLET ORAL
Status: DISCONTINUED | OUTPATIENT
Start: 2021-03-22 | End: 2021-03-25

## 2021-03-21 RX ORDER — MAGNESIUM OXIDE 400 MG (241.3 MG MAGNESIUM) TABLET
400 TABLET ONCE
Status: COMPLETED | OUTPATIENT
Start: 2021-03-21 | End: 2021-03-21

## 2021-03-21 NOTE — PROGRESS NOTES
Pt is Ax4, vss, RUE precautions, bed alarm, contact iso for C. Diff, TPN infusing, /Tele, JEANETTE no CPAP, voids, up with standby. EDYTA drain output was elevated, purulent and foul smelling, insertion point c/d/i BLAYNE.  Tolerating full liquids and requesting up

## 2021-03-21 NOTE — PLAN OF CARE
Pt a&o x 4. Cheerful & cooperative w/ care. Maintaining sats on ra. Marky precautions cont  Tele = nsr/st.   Electrolyte protocol  Up to bathroom w/ steady gait  Orders noted from surgery to remove staples  Ostomy appliance cdi.    Gaurang maintained to bulb s cognitive and physical deficits and behaviors that affect risk of falls.   - [place] fall precautions as indicated by assessment.  - Educate pt/family on patient safety including physical limitations  - Instruct pt to call for assistance with activity bas SOB or any respiratory difficulty  - Respiratory Therapy support as indicated  - Manage/alleviate anxiety  - Monitor for signs/symptoms of CO2 retention  Outcome: Progressing     Problem: METABOLIC/FLUID AND ELECTROLYTES - ADULT  Goal: Electrolytes maintai appropriate  - Assist with meals as needed  - Monitor I&O, WT and lab values  - Obtain nutritional consult as needed  - Optimize oral hygiene and moisture  - Encourage food from home; allow for food preferences  - Enhance eating environment  Outcome: Progr

## 2021-03-21 NOTE — PROGRESS NOTES
[facility]  Progress Note     Isaac Guallpaarmando Patient Status:  Observation    1965 MRN [id number]   [facility] 8NE-A Attending Dr. Rosenbaum Done Day # 40 PCP Alton Lora MD     Chief Complaint: Chest pain, SOB    S: Pt (L)).    No results for input(s): PTP, INR in the last 168 hours. No results for input(s): TROP, CK in the last 168 hours. Imaging: Imaging data reviewed in Epic.     Medications:   • mycophenolate mofetil  750 mg Oral 2 times per day   • Pantoprazole follow up there  4. Hypertension, stable  1. Norvasc/lisinopril   5. Hyperlipidemia  1. Statin and fenofibrate  6. GERD  7. JEANETTE on CPAP  8. Anemia, partly dilutional, stable  9. Urinary retention, resolved  10. Possible orthostasis, resolved  11.  Estanislado Prose

## 2021-03-21 NOTE — PROGRESS NOTES
[facility]  Progress Note    Jim Gallegos Patient Status:  Inpatient    1965 MRN [id number]   [facility] 3NW-A Attending Néstor Orr MD   Hosp Day # 40 PCP Meme Edwards MD     Subjective:   The patient states that he is swelling     Right low back pain     Diverticulosis of large intestine without perforation or abscess without bleeding     Second degree hemorrhoids     Acute gastritis     Bursitis of left shoulder     Biceps tendonitis, left     Rotator cuff tendonitis, productive  Incision:  Clean, dry and intact without erythema or drainage. A/P POD #14 subtotal colectomy with end ileostomy with pancreatic necrosis     1. Advance to a low-fat general diet  2.  Possible wean TPN tomorrow, depending on p.o. intake

## 2021-03-21 NOTE — PROGRESS NOTES
Pulmonary Progress Note     Assessment / Plan:  1. Acute respiratory failure - was intubated in setting of perforated bowel and C Diff / toxic megacolon requiring surgery, as well as underlying sarcoidosis.  Improved, extubated 3/7.  -O2 as needed - current Exam:  General: no apparent distress, conversant  Skin: no rash, ulcers or subcutaneous nodules  Eyes: anicteric sclerae, moist conjunctivae  Head, ears, nose, throat: atraumatic, oropharynx clear with moist mucous membranes  Neck: Rt internal jugular CVC

## 2021-03-21 NOTE — DIETARY NOTE
[address] [facility] - TPN FOLLOW UP    Cayetano Virk     TPN goal: 530 dextrose calories,  330 lipid calories, 30% lipids, 60 grams protein in 1800 mL fluid total of 1,100 kcal 50% of needs. · Per surgery start weaning TPN.  Possible

## 2021-03-22 PROCEDURE — 99232 SBSQ HOSP IP/OBS MODERATE 35: CPT | Performed by: HOSPITALIST

## 2021-03-22 RX ORDER — OXYCODONE HYDROCHLORIDE 10 MG/1
10 TABLET ORAL EVERY 4 HOURS PRN
Status: DISCONTINUED | OUTPATIENT
Start: 2021-03-22 | End: 2021-03-29

## 2021-03-22 NOTE — PAYOR COMM NOTE
--------------  CONTINUED STAY REVIEW    Payor: JAD PPBARBARA  Subscriber #:  [id number]  Authorization Number: [id number]    Admit date: 2/12/21  Admit time:  2:21 PM    Admitting Physician: Landen Singh MD  Attending Physician:  Cris Rodas MD  [facility] Dose Route User    3/22/2021 1110 Given 0.5 mg Intravenous Andrea Romero, RN    3/22/2021 9778 Given 0.5 mg Intravenous Vanessa Membreno, RN    3/21/2021 2234 Given 0.5 mg Intravenous Vanessa Membreno, RN    3/21/2021 1736 Given 0.5 mg Intravenous Miesha Rodriguez, Body aches     Gastroesophageal reflux disease     Throat pain     NAFLD (nonalcoholic fatty liver disease)     Hypokalemia     Leukocytosis     SALLY (acute kidney injury) ([facility] [address]     Hyponatremia     Adjustment disorder with mixed anxiety and depressed mood

## 2021-03-22 NOTE — PHYSICAL THERAPY NOTE
PHYSICAL THERAPY TREATMENT NOTE - INPATIENT    Room Number: 325/325-A     Session: 9   Number of Visits to Meet Established Goals: 10    Presenting Problem: s/p total abdoinal colectomy/ileostomy 3/7    Problem List  Principal Problem:    Chest pain of un Zee Aguilera MD at [address] [place] [address] N/A 3/6/2021    Performed by Keyana Dunlap MD at [facility] ENDOSCOPY   • NEEDLE BIOPSY LIVER     • OTHER      biopsies   • OTHER SURGICAL HISTORY      septoplasty   • OTHER SURGICAL HISTORY 180  Assistive Device: Rolling walker;None  Pattern: Within Functional Limits (mild instability without RW)  Stoop/Curb Assistance: Not tested  Comment :  (N/A)    Skilled Therapy Provided: Arrived to pt supine in bed. Pt agreeable to therapy.  Pt completed conservation;Patient education; Family education;Gait training;Strengthening;Stair training;Transfer training;Balance training  Rehab Potential : Good  Frequency (Obs): 3-5x/week    CURRENT GOALS      Goal #1       Patient is able to demonstrate supine - si

## 2021-03-22 NOTE — PAYOR COMM NOTE
--------------  CONTINUED STAY REVIEW    Payor: JAD PPO  Subscriber #:  [id number]  Authorization Number: [id number]    Admit date: 2/12/21  Admit time:  2:21 PM    Admitting Physician: Malathi Garcia MD  Attending Physician:  Tammy Brunson MD  [facility] Jeanntete Ag RN    3/21/2021 1504 Given 5,000 Units Subcutaneous (Left Lower Abdomen) Denae Cardona RN      HYDROmorphone HCl (DILAUDID) 1 MG/ML injection 0.5 mg     Date Action Dose Route User    3/22/2021 2205 Given 0.5 mg Intravenous Jeannette Ag, mg Oral Divina Osei, RN      dexamethasone (DECADRON) tab 5 mg     Date Action Dose Route User    3/21/2021 1148 Given 5 mg Oral Mahin Singh RN          Plan: 3/20  Assessment / Plan:  1.  Acute respiratory failure - was intubated in setting of per focal necrosis. He is s/p cholecystectomy. Plan:   1. Continue diet/TPN per general surgery recommendations: pt is going to have a trial of FLD that has already been ordered. Would advance diet as tolerated slowly to low fat/low dairy.   Would need nutrit prophylaxis with heparin     3/21  Pulmonary Progress Note      Assessment / Plan:  17. Acute respiratory failure - was intubated in setting of perforated bowel and C Diff / toxic megacolon requiring surgery, as well as underlying sarcoidosis.  Improved, ex Weight:     170 lb (77.1 kg)     Height:                 Physical Exam:  General: no apparent distress, conversant  Skin: no rash, ulcers or subcutaneous nodules  Eyes: anicteric sclerae, moist conjunctivae  Head, ears, nose, throat: atraumatic, orophary amylase        Plan:  26. Advance to low-fat diet  27. The patient was discussed with dietitian in regards to weaning TPN. Calorie count today for possible discontinuation of TPN tomorrow. 28. Ambulate and up to chair  29. Continue ostomy care  30.  Guy Garrett

## 2021-03-22 NOTE — PROGRESS NOTES
[facility]  Progress Note     Destin Pen Patient Status:  Observation    1965 MRN [id number]   [facility] 8NE-A Attending Dr. Dereck Holliday Day # 45 PCP Gerhardt Guile, MD     Chief Complaint: Chest pain, SOB    S: Pt SCr of 0.38 mg/dL (L)). No results for input(s): PTP, INR in the last 168 hours. No results for input(s): TROP, CK in the last 168 hours. Imaging: Imaging data reviewed in Epic.     Medications:   • dexamethasone  4 mg Oral Daily with food   • mycop 5. Hyperlipidemia  1. Statin and fenofibrate  6. GERD  7. JEANETTE on CPAP  8. Anemia, partly dilutional, stable  9. Urinary retention, resolved  10. Possible orthostasis, resolved  11. Deconditioning   1. PT/OT, fall precautions.    12. Anxiety    tpn stopped

## 2021-03-22 NOTE — PLAN OF CARE
Patient A/O X 4, VSS, TPN infusing per orders. Patient ambulated with PT. Patient denies pain at this time. EDYTA draining milky drainage. Ileostomy draining brown stool. Site intact.       Problem: CARDIOVASCULAR - ADULT  Goal: Maintains optimal cardiac outp restriction as ordered  - Instruct patient on fluid and nutrition restrictions as appropriate  Outcome: Progressing  Goal: Hemodynamic stability and optimal renal function maintained  Description: INTERVENTIONS:  - Monitor labs and assess for signs and sym patient/family/discharge partner in discharge planning  - Arrange for needed discharge resources and transportation as appropriate  - Identify discharge learning needs (meds, wound care, etc)  - Arrange for interpreters to assist at discharge as needed  - Patient's Short Term Goal: get some sleep    Interventions:   - cluster care as able  - See additional Care Plan goals for specific interventions  Outcome: Progressing

## 2021-03-22 NOTE — PROGRESS NOTES
[address] [facility] Patient Status:  Inpatient    1965 MRN [id number]   [facility] 3NW-A Attending Nora Correa MD   Hosp Day # 45 PCP Gerhardt Guile, MD     SUBJECTIVE: no new events.   Waiting transfer to rehab Daily PRN  •  magnesium hydroxide (MILK OF MAGNESIA) 400 MG/5ML suspension 30 mL, 30 mL, Oral, Daily PRN  •  bisacodyl (DULCOLAX) rectal suppository 10 mg, 10 mg, Rectal, Daily PRN  •  Fleet Enema (FLEET) 7-19 GM/118ML enema 133 mL, 1 enema, Rectal, Once P INR 1.16 (H) 03/07/2021    INR 0.99 03/07/2021            Assessment / Plan:  1. Acute respiratory failure - intubated for surgery- resolved  -O2 as needed - currently on room air  2.  Atypical chest pain - ongoing for several weeks  - Had been felt to be d

## 2021-03-22 NOTE — OCCUPATIONAL THERAPY NOTE
OCCUPATIONAL THERAPY TREATMENT NOTE - INPATIENT     Room Number: 325/325-A  Session:  5  Number of Visits to Meet Established Goals: 7    Presenting Problem: chest pain, bowel perforation     History related to current admission: Patient is a 56y/o male ad COLONOSCOPY  11/2015    4 small adeonomas, diverticulosis. repeat 2018   • EGD  11/2015     schatzki's ring, non obstructive. Erosive esophagitis, LA grad 1 (no barretts on path).  Gastritis, no hpylori on path   • ENDOBRONCHIAL ULTRASOUND (EBUS) N/A 2/16/2 guard assist  Sit to Stand: Contact guard assist    Skilled Therapy Provided: Pt was received supine in bed for session, pt t/f to EOB with CGA, pt was able to amb x1 in room and bathroom with RW and CGA, therapist assist pt to complete toileting at toilet transfer to toilet:  with supervision     ADDITIONAL GOALS:  Patient will independently adhere to precautions during self-care and functional mobility tasks. PPE worn: surgical mask, gloves, eye protection.

## 2021-03-22 NOTE — PROGRESS NOTES
SUBJECTIVE:    CC: ADL mobility deficits secondary to toxic megacolon with perforated bowel, status post urgent decompression, total abdominal colectomy with end ileostomy    Subjective: Patient seen and examined with additional person Fernando present. Guadalupe Riedel   He 3/22/2021 1426  Last data filed at 3/22/2021 1232  Gross per 24 hour   Intake 2597 ml   Output 3795 ml   Net -1198 ml     Physical Therapy:   Occupational Therapy:   LUNG: normal Breath Sounds, no wheezes, no rhonchi, no tachypnea.   CARDIOVASCULAR: Regular Obed Dyer MD  [facility].

## 2021-03-22 NOTE — DIETARY NOTE
[facility]    NUTRITION ASSESSMENT    Pt does not meet malnutrition criteria. NUTRITION INTERVENTION:    1. Meal and Snacks - monitor patient po intake. Encourage adequate po of appropriate diet once diet advances.   2. Medical Food Supplements - En able to tolerate. Will monitor and add ONS if not meeting needs by PO once diet advances.     2/23 - Pt continues with good appetite, nursing notes reports Percent Meals Eaten (%): 100 % intake for last meal. No new wt available. No BM noted.  Pt reported n No  Cultural/Ethnic/Adventist Preferences Addresses: Yes    GI SYSTEM REVIEW: abdominal pain    NUTRITION RELATED PHYSICAL FINDINGS:     1. Body Fat/Muscle Mass: LAURO     2.  Fluid Accumulation: none     NUTRITION PRESCRIPTION: Previous wt -85 kg   Calories:

## 2021-03-22 NOTE — PLAN OF CARE
Pt a&ox4, VSS, afebrile. Room air. Tele monitoring. JEANETTE without cpap. Voids freely, up  To bathroom with walker with steady gait. Midline incision to abdomen well approximated, ileostomy producing stool. EDYTA to bulb suction with milky drainage.  Pain control Identify cognitive and physical deficits and behaviors that affect risk of falls.   - [place] fall precautions as indicated by assessment.  - Educate pt/family on patient safety including physical limitations  - Instruct pt to call for assistance with act report SOB or any respiratory difficulty  - Respiratory Therapy support as indicated  - Manage/alleviate anxiety  - Monitor for signs/symptoms of CO2 retention  Outcome: Progressing     Problem: METABOLIC/FLUID AND ELECTROLYTES - ADULT  Goal: Electrolytes treat as appropriate  - Assist with meals as needed  - Monitor I&O, WT and lab values  - Obtain nutritional consult as needed  - Optimize oral hygiene and moisture  - Encourage food from home; allow for food preferences  - Enhance eating environment  [employer]

## 2021-03-22 NOTE — PROGRESS NOTES
Spoke with dietician, Niki, re: discontinuation of TPN. Per Niki, decrease to half rate then DC when bag is complete.

## 2021-03-22 NOTE — CM/SW NOTE
AVIVA spoke with Fatimah Sepulveda from [facility], and they have obtained insurance authorization. AVIVA updated RN, and made her aware pt will have to off IV pain medication and managed on orals prior to discharge. AVIVA also updated Heather that TPN was d/c'd. AVIVA to follow.

## 2021-03-22 NOTE — PROGRESS NOTES
[facility]  Progress Note    Angel Galloway Patient Status:  Inpatient    1965 MRN [id number]   [facility] 3NW-A Attending Chela Rodriguez MD   Hosp Day # 45 PCP Anabell Medina MD     Subjective:   The patient denies new compla List:     Hyperlipidemia     Sarcoidosis     Lipoma of torso     Erectile dysfunction, unspecified erectile dysfunction type     Chest pain     Dyspnea, unspecified type     Pain of right hip joint     Leg swelling     Right low back pain     [address] and reports. I agree with the above assessment and plan and again have modified the report to reflect my opinion.         Leonard Steinberg MD   [facility]  3/22/2021  7:44 PM

## 2021-03-23 PROCEDURE — 99232 SBSQ HOSP IP/OBS MODERATE 35: CPT | Performed by: HOSPITALIST

## 2021-03-23 RX ORDER — HYDROMORPHONE HYDROCHLORIDE 1 MG/ML
1 INJECTION, SOLUTION INTRAMUSCULAR; INTRAVENOUS; SUBCUTANEOUS ONCE
Status: COMPLETED | OUTPATIENT
Start: 2021-03-23 | End: 2021-03-23

## 2021-03-23 RX ORDER — ACETAMINOPHEN 500 MG
1000 TABLET ORAL EVERY 6 HOURS PRN
Status: DISCONTINUED | OUTPATIENT
Start: 2021-03-23 | End: 2021-04-01

## 2021-03-23 NOTE — PROGRESS NOTES
Pharmacy Note:  Route Optimization for Acetaminophen         Patient is currently on acetaminophen 1000mg IV q6hr prn.   The patient meets the criteria to convert to the oral equivalent as established by the IV to oral conversion protocol approved by the

## 2021-03-23 NOTE — PLAN OF CARE
Patient  A/O X 4, VSS, TPN continuing to wean. Patient medicated with 1 x dose of 1mg IV dilaudid for 8/10 abdominal pain. EDYTA draining milky white drainage. Ileostomy draining liquid brown stool. Midline incision BLAYNE, remains CDI.      Problem: CARDIOVASCUL repeat lab results as appropriate  - Fluid restriction as ordered  - Instruct patient on fluid and nutrition restrictions as appropriate  Outcome: Progressing  Goal: Hemodynamic stability and optimal renal function maintained  Description: INTERVENTIONS: FALL  Goal: Free from fall injury  Description: INTERVENTIONS:  - Assess pt frequently for physical needs  - Identify cognitive and physical deficits and behaviors that affect risk of falls.   - [place] fall precautions as indicated by assessment.  - Educ profile  Outcome: Progressing  Goal: Maintains adequate nutritional intake (undernourished)  Description: INTERVENTIONS:  - Monitor percentage of each meal consumed  - Identify factors contributing to decreased intake, treat as appropriate  - Assist with m

## 2021-03-23 NOTE — PAYOR COMM NOTE
--------------  CONTINUED STAY REVIEW    Payor: JAD KLINE  Subscriber #:  [id number]  Authorization Number: [id number]    Admit date: 2/12/21  Admit time:  2:21 PM    FAXING CLINICAL UPDATE FOR 3/22/21    3/22/21   OBJECTIVE:  /70 (BP Location: Left with [facility]  4. Pancreatic mass/duodenal/jejunal inflammation: pancreas now more c/w pancreatitis with focal necrosis.   -GI/surgery following  5. C Diff/toxic megacolon/bowel perforation - s/p colectomy/ileostomy 3/7  -per surgery  6.  JEANETTE  - CPAP w/ Swathi Astudillo RN      melatonin tab 3 mg     Date Action Dose Route User    3/23/2021 0106 Given 3 mg Oral Maddi Devine RN      mycophenolate mofetil (CELLCEPT) cap 750 mg     Date Action Dose Route User    3/23/2021 1056 Given 750 mg Oral Swathi Astudillo RN

## 2021-03-23 NOTE — PLAN OF CARE
A&Ox4, VSS, lungs clear bilaterally, diminished in bases, breathing regular/non-labored on RA. Abd is soft, rounded, tender, bowel sounds active, gas present in ileostomy bag, ileostomy producing soft, brown stool, site appears c/d/I.  Pt voiding via bedsid cognitive and physical deficits and behaviors that affect risk of falls.   - [place] fall precautions as indicated by assessment.  - Educate pt/family on patient safety including physical limitations  - Instruct pt to call for assistance with activity bas SOB or any respiratory difficulty  - Respiratory Therapy support as indicated  - Manage/alleviate anxiety  - Monitor for signs/symptoms of CO2 retention  Outcome: Progressing     Problem: METABOLIC/FLUID AND ELECTROLYTES - ADULT  Goal: Electrolytes maintai appropriate  - Assist with meals as needed  - Monitor I&O, WT and lab values  - Obtain nutritional consult as needed  - Optimize oral hygiene and moisture  - Encourage food from home; allow for food preferences  - Enhance eating environment  Outcome: Progr

## 2021-03-23 NOTE — PROGRESS NOTES
[facility]  Progress Note    Santa Roper Patient Status:  Inpatient    1965 MRN [id number]   [facility] 3NW-A Attending Kennedi Yu MD   Hosp Day # 44 PCP Jos Ha MD     Subjective:   The patient is resting in bed recorded.     Assessment  Patient Active Problem List:     Hyperlipidemia     Sarcoidosis     Lipoma of torso     Erectile dysfunction, unspecified erectile dysfunction type     Chest pain     Dyspnea, unspecified type     Pain of right hip joint     Leg sw nico. Varsha Monzon PA-C  3/23/2021  11:44 AM    Addendum:  Dr. Cheyenne Vanegas    I have reviewed the above note and evaluation by the physician assistant.  I agree with her physical exam and the data listed in the report, and I have made any relev

## 2021-03-23 NOTE — PROGRESS NOTES
[address] [facility] Patient Status:  Inpatient    1965 MRN [id number]   [facility] 3NW-A Attending Alberto Doty MD   Hosp Day # 44 PCP Eufemia Rodriguez MD     Pulm / Critical Care Progress Note     S: pt reports ongo Normocephalic, without obvious abnormality, atraumatic. Lungs: ctab   Chest wall: No tenderness or deformity. Heart: Regular rate and rhythm, normal S1S2, no murmur. Abdomen: soft, non-tender, non-distended, positive BS.    Extremity: no edema     Rec surgery  6. JEANETTE  - CPAP w/ sleep  7. Proph   - heparin tid  8.  Dispo   - full code       Ashley Nicholson M.D.  [facility] pulmonary / critical care  3/23/2021  12:43 PM

## 2021-03-23 NOTE — CM/SW NOTE
Case discussed in rounds. Plan is for [facility] once pt is medically cleared, weaned off IV pain meds. SW received message from [place] with [facility] advising to have a back up plan.  She said that they do currently have insurance auth for acute rehab but lalito

## 2021-03-23 NOTE — PHYSICAL THERAPY NOTE
PHYSICAL THERAPY TREATMENT NOTE - INPATIENT    Room Number: 325/325-A     Session: 10   Number of Visits to Meet Established Goals: 10    Presenting Problem: s/p total abdoinal colectomy/ileostomy 3/7     History related to current admission:   Hx sarcoid Procedure Laterality Date   • BIOPSY/REMOVAL, LYMPH NODE(S) Bilateral 2013    Lymph node removal (neck)   • BRONCHOSCOPY N/A 2/16/2021    Performed by Erwin Telles MD at [address] [facility] N/A 6/1/2020    Performed FORM - BASIC MOBILITY  How much difficulty does the patient currently have. ..  -   Turning over in bed (including adjusting bedclothes, sheets and blankets)?: None   -   Sitting down on and standing up from a chair with arms (e.g., wheelchair, bedside comm present    ASSESSMENT   Pt with limited participation in PT this session due to c/o abdominal pain. Pt agreeable to gait within room only. Pt did require min assist for sit to stand this session, otherwise supervision level.  Pt will continue to benefit fro

## 2021-03-23 NOTE — PROGRESS NOTES
[facility]  Progress Note     Neptali Vargas Patient Status:  Observation    1965 MRN [id number]   [facility] 8NE-A Attending Dr. Matthew Jason Day # 44 PCP Heidy Thompson MD     Chief Complaint: Chest pain, SOB    S: Pt input(s): PTP, INR in the last 168 hours. No results for input(s): TROP, CK in the last 168 hours. Imaging: Imaging data reviewed in Epic.     Medications:   • dexamethasone  4 mg Oral Daily with food   • mycophenolate mofetil  750 mg Oral 2 times per orthostasis, resolved  12. Deconditioning   1. PT/OT, fall precautions.    13. Anxiety    Quality:  · DVT Prophylaxis: heparin  · CODE status: Full  · Hyde: mo  · RIJ in place    Will the patient be referred to TCC on discharge?: yes  Estimated date of dis

## 2021-03-24 ENCOUNTER — APPOINTMENT (OUTPATIENT)
Dept: CT IMAGING | Facility: HOSPITAL | Age: 56
DRG: 981 | End: 2021-03-24
Attending: PHYSICIAN ASSISTANT
Payer: COMMERCIAL

## 2021-03-24 PROCEDURE — 99232 SBSQ HOSP IP/OBS MODERATE 35: CPT | Performed by: INTERNAL MEDICINE

## 2021-03-24 PROCEDURE — 74177 CT ABD & PELVIS W/CONTRAST: CPT | Performed by: PHYSICIAN ASSISTANT

## 2021-03-24 NOTE — PHYSICAL THERAPY NOTE
PHYSICAL THERAPY TREATMENT NOTE - INPATIENT    Room Number: 325/325-A     Session: 12  Number of Visits to Meet Established Goals: 10    Presenting Problem: s/p total abdoinal colectomy/ileostomy 3/7     History related to current admission:   Hx dottie Laterality Date   • BIOPSY/REMOVAL, LYMPH NODE(S) Bilateral 2013    Lymph node removal (neck)   • BRONCHOSCOPY N/A 2/16/2021    Performed by Monika Cross MD at [address] [facility] N/A 6/1/2020    Performed by Georgette Gan adjusting bedclothes, sheets and blankets)?: A Little   -   Sitting down on and standing up from a chair with arms (e.g., wheelchair, bedside commode, etc.): A Little   -   Moving from lying on back to sitting on the side of the bed?: A Little   How much h independence. The -PAC '6-Clicks' Inpatient Basic Mobility Short Form was completed and this patient is demonstrating a 51% degree of impairment in mobility. Research supports that patients with this level of impairment may benefit from [address].       Zachariah Keyla

## 2021-03-24 NOTE — CM/SW NOTE
Care Progression Note:  Active Acute Medical Issue:   53 y/o male with C difficile induced toxic megacolon with perforation of bowel, s/p open total abdominal colectomy with end ileostomy on 3/7; with continued abd pain and leukocytosis, thus plan for CT a 0.25

## 2021-03-24 NOTE — PROGRESS NOTES
[facility]  Progress Note     Susannah Wolfe Patient Status:  Observation    1965 MRN [id number]   [facility] 8NE-A Attending Dr. Jose Blackwell Day # 36 PCP Nohemi Garner MD     Chief Complaint: Chest pain, SOB    S: Pt last 168 hours. Imaging: Imaging data reviewed in Epic.     Medications:   • dexamethasone  4 mg Oral Daily with food   • mycophenolate mofetil  750 mg Oral 2 times per day   • Pantoprazole Sodium  40 mg Oral QAM AC   • tamsulosin HCl  0.4 mg Oral Night heparin  · CODE status: Full  · Hyde: mo  · RIJ in place    Will the patient be referred to TCC on discharge?: yes  Estimated date of discharge: 1-2 days depending on po intake, Acute rehab MJ    Case d/w RN, pt    Chao Reza MD

## 2021-03-24 NOTE — PROGRESS NOTES
[address] [facility] Patient Status:  Inpatient    1965 MRN [id number]   [facility] 3NW-A Attending Jessica Hayes MD   [facility] Day # 36 PCP Vivi Au MD     Pulm / Critical Care Progress Note     S: pt states april atraumatic. Lungs: ctab   Chest wall: No tenderness or deformity. Heart: Regular rate and rhythm, normal S1S2, no murmur. Abdomen: soft, non-tender, non-distended, positive BS.    Extremity: no edema     Recent Labs   Lab 03/22/21  0640 03/24/21  6676 megacolon/bowel perforation - s/p colectomy/ileostomy 3/7  -per surgery  6. JEANETTE  - CPAP w/ sleep  7. Proph   - heparin tid  8.  Dispo   - full code     Agueda Allred M.D.  [facility] pulmonary / critical care  3/24/2021  1:01 PM

## 2021-03-24 NOTE — PROGRESS NOTES
Pt is Ax4, vss, /Tele, RUE precautions, bed alarm activated, general diet w/ poor appetite, voids, ileostomy w/ liquid, brown stool. Staples removed from midline incision, janet, no drainage noted.  EDYTA is putting out pink tinged/brown/thick drainage, inser

## 2021-03-24 NOTE — PROGRESS NOTES
[facility]  Progress Note    Ro Bush Patient Status:  Inpatient    1965 MRN [id number]   [facility] 3NW-A Attending Alberto Doty MD   Hosp Day # 44 PCP Eufemia Rodriguez MD     Subjective:   The patient sitting up in his shifts: In: 2587 [P.O.:720; I.V.:1867]  Out: 4220 [Urine:3300; Drains:340; Stool:580]  No intake/output data recorded.     Assessment  Patient Active Problem List:     Hyperlipidemia     Sarcoidosis     Lipoma of torso     Erectile dysfunction, unspecified prophylaxis with Protonix. 9. DVT prophylaxis with heparin. My total face time with this patient was 23 minutes. Greater than half of our visit was spent in counseling the patient on the above listed diagnoses and treatment options.     SHAKA Dhaliwal-

## 2021-03-25 ENCOUNTER — APPOINTMENT (OUTPATIENT)
Dept: CT IMAGING | Facility: HOSPITAL | Age: 56
DRG: 981 | End: 2021-03-25
Attending: PHYSICIAN ASSISTANT
Payer: COMMERCIAL

## 2021-03-25 ENCOUNTER — APPOINTMENT (OUTPATIENT)
Dept: GENERAL RADIOLOGY | Facility: HOSPITAL | Age: 56
DRG: 981 | End: 2021-03-25
Attending: STUDENT IN AN ORGANIZED HEALTH CARE EDUCATION/TRAINING PROGRAM
Payer: COMMERCIAL

## 2021-03-25 PROCEDURE — 49406 IMAGE CATH FLUID PERI/RETRO: CPT | Performed by: PHYSICIAN ASSISTANT

## 2021-03-25 PROCEDURE — 99232 SBSQ HOSP IP/OBS MODERATE 35: CPT | Performed by: INTERNAL MEDICINE

## 2021-03-25 PROCEDURE — 0W9G3ZZ DRAINAGE OF PERITONEAL CAVITY, PERCUTANEOUS APPROACH: ICD-10-PCS | Performed by: RADIOLOGY

## 2021-03-25 PROCEDURE — 99152 MOD SED SAME PHYS/QHP 5/>YRS: CPT | Performed by: PHYSICIAN ASSISTANT

## 2021-03-25 PROCEDURE — 71045 X-RAY EXAM CHEST 1 VIEW: CPT | Performed by: STUDENT IN AN ORGANIZED HEALTH CARE EDUCATION/TRAINING PROGRAM

## 2021-03-25 PROCEDURE — 99153 MOD SED SAME PHYS/QHP EA: CPT | Performed by: PHYSICIAN ASSISTANT

## 2021-03-25 RX ORDER — SODIUM CHLORIDE 9 MG/ML
INJECTION, SOLUTION INTRAVENOUS CONTINUOUS
Status: DISCONTINUED | OUTPATIENT
Start: 2021-03-25 | End: 2021-03-25 | Stop reason: HOSPADM

## 2021-03-25 RX ORDER — MIDAZOLAM HYDROCHLORIDE 1 MG/ML
1 INJECTION INTRAMUSCULAR; INTRAVENOUS EVERY 5 MIN PRN
Status: DISCONTINUED | OUTPATIENT
Start: 2021-03-25 | End: 2021-03-25 | Stop reason: HOSPADM

## 2021-03-25 RX ORDER — VANCOMYCIN HYDROCHLORIDE 125 MG/1
125 CAPSULE ORAL EVERY 6 HOURS
Status: DISCONTINUED | OUTPATIENT
Start: 2021-03-25 | End: 2021-04-01

## 2021-03-25 RX ORDER — NALOXONE HYDROCHLORIDE 0.4 MG/ML
80 INJECTION, SOLUTION INTRAMUSCULAR; INTRAVENOUS; SUBCUTANEOUS AS NEEDED
Status: DISCONTINUED | OUTPATIENT
Start: 2021-03-25 | End: 2021-03-25 | Stop reason: HOSPADM

## 2021-03-25 RX ORDER — FLUMAZENIL 0.1 MG/ML
0.2 INJECTION, SOLUTION INTRAVENOUS AS NEEDED
Status: DISCONTINUED | OUTPATIENT
Start: 2021-03-25 | End: 2021-03-25 | Stop reason: HOSPADM

## 2021-03-25 RX ORDER — SODIUM CHLORIDE 9 MG/ML
INJECTION, SOLUTION INTRAVENOUS CONTINUOUS
Status: DISCONTINUED | OUTPATIENT
Start: 2021-03-25 | End: 2021-04-01

## 2021-03-25 RX ORDER — DEXAMETHASONE 2 MG/1
2 TABLET ORAL
Status: DISCONTINUED | OUTPATIENT
Start: 2021-03-26 | End: 2021-04-01

## 2021-03-25 RX ORDER — MIDAZOLAM HYDROCHLORIDE 1 MG/ML
INJECTION INTRAMUSCULAR; INTRAVENOUS
Status: COMPLETED
Start: 2021-03-25 | End: 2021-03-25

## 2021-03-25 NOTE — PROGRESS NOTES
[address] Patient Status:  Inpatient    1965 MRN [id number]   [facility] 3NW-A Attending Damian Decker MD   [facility] Day # 39 PCP Marisue Felty, MD     Pulm / Critical Care Progress Note     S: pulm status u [Urine:350; Drains:110; Stool:100]     Physical Exam:   General: alert, cooperative,  No respiratory distress. Head: Normocephalic, without obvious abnormality, atraumatic. Lungs: ctab   Chest wall: No tenderness or deformity.    Heart: Regular rate and up with [facility]  4. Pancreatic mass/duodenal/jejunal inflammation: pancreas now more c/w pancreatitis with focal necrosis. Increasing abd pain as transitioning off iv pain meds.  Ct c/w abscess  -GI/surgery following  -attempting to transition to oral m

## 2021-03-25 NOTE — PROGRESS NOTES
[facility]  Progress Note    Kimmy Pack Patient Status:  Inpatient    1965 MRN [id number]   [facility] 3NW-A Attending Nathanael Diane MD   [facility] Day # 39 PCP Daquan Hernandes MD     Subjective:   The patient is resting comf unspecified type     Pain of right hip joint     Leg swelling     Right low back pain     Diverticulosis of large intestine without perforation or abscess without bleeding     Second degree hemorrhoids     Acute gastritis     Bursitis of left shoulder

## 2021-03-25 NOTE — PAYOR COMM NOTE
--------------  CONTINUED STAY REVIEW    Payor: JAD KLINE  Subscriber #:  [id number]  Authorization Number: [id number]    Admit date: 2/12/21  Admit time:  2:21 PM    FAXING CLINICAL UPDATE FOR 3/25/21    3/25/21   Chief Complaint: Chest pain, SOB     S: po/dilaudid IV  4. Surgery plans to dc with drain  2. Low BP  1. Stop novasc/lisinopril, consider IVF if not eating well  3. Pancreatic mass/Necrotizing pancreatitis  1. GI/surgery following  2. elevated CA 19-9   3. Now with pancreatic abscess  4.  Pancrea User    3/25/2021 1006 Given 0.5 mg Intravenous Sandra Whitaker, BRENDA    3/25/2021 0748 Given 0.5 mg Intravenous Sandra Whitaker, BRENDA    3/25/2021 0517 Given 0.5 mg Intravenous Anuradha Correia, BRENDA    3/25/2021 0121 Given 0.5 mg Intravenous Anuradha Correia RN

## 2021-03-25 NOTE — PLAN OF CARE
Problem: CARDIOVASCULAR - ADULT  Goal: Maintains optimal cardiac output and hemodynamic stability  Description: INTERVENTIONS:  - Monitor vital signs, rhythm, and trends  - Monitor for bleeding, hypotension and signs of decreased cardiac output  - Evalua Encourage toileting schedule  Outcome: Progressing     Problem: DISCHARGE PLANNING  Goal: Discharge to home or other facility with appropriate resources  Description: INTERVENTIONS:  - Identify barriers to discharge w/pt and caregiver  - Include patient/fa medications  - Encourage mobilization and activity  - Obtain nutritional consult as needed  - Establish a toileting routine/schedule  - Consider collaborating with pharmacy to review patient's medication profile  Outcome: Progressing  Goal: Maintains adequ

## 2021-03-25 NOTE — PLAN OF CARE
Pt is A&O, VSS, with mild c/o abdominal pain. Denies nausea. Pt is on RA with bilateral clear diminished lung sounds, . NSR on telemetry. IS at bedside encouraged. Abdomen is soft and nontender with active bowel sounds. Midline incision BLAYNE- healing.  RL Progressing     Problem: SAFETY ADULT - FALL  Goal: Free from fall injury  Description: INTERVENTIONS:  - Assess pt frequently for physical needs  - Identify cognitive and physical deficits and behaviors that affect risk of falls.   - [place] fall precaut broncho-pulmonary hygiene including cough, deep breathe, Incentive Spirometry  - Assess the need for suctioning and perform as needed  - Assess and instruct to report SOB or any respiratory difficulty  - Respiratory Therapy support as indicated  - Manage/a nutritional intake (undernourished)  Description: INTERVENTIONS:  - Monitor percentage of each meal consumed  - Identify factors contributing to decreased intake, treat as appropriate  - Assist with meals as needed  - Monitor I&O, WT and lab values  - Obta

## 2021-03-25 NOTE — PROGRESS NOTES
Patient back from radiology in stable condition. Vss, HR remains elevated with rate 110-120's on tele monitor. Drains to abdomen x3 in place with milky/tan/brown drainage noted in all.  New drains placed to left abdomen and middle of abdomen and dressings i

## 2021-03-25 NOTE — OCCUPATIONAL THERAPY NOTE
OCCUPATIONAL THERAPY TREATMENT NOTE - INPATIENT     Room Number: 325/325-A  Session:  6  Number of Visits to Meet Established Goals: 7    Presenting Problem: chest pain, bowel perforation     History related to current admission: Patient is a 54y/o male ad COLONOSCOPY  11/2015    4 small adeonomas, diverticulosis. repeat 2018   • EGD  11/2015     schatzki's ring, non obstructive. Erosive esophagitis, LA grad 1 (no barretts on path).  Gastritis, no hpylori on path   • ENDOBRONCHIAL ULTRASOUND (EBUS) N/A 2/16/2 Therapy Provided:   Pt visibly fatigued, c/o 9/10 pain despite recent IV pain medication. RN aware. Politely declines OOB activity this day, however agreeable to bed level exercise. Engaged in [address] Completes with mod (I).  HR 120s with activ worn

## 2021-03-25 NOTE — IMAGING NOTE
Per Sydni Malcolm inpatient RN, Ceola Paget is alert and oriented, consentable and NPO since MN with no heparin given since am dose today. Abscess drain placement scheduled for 1630. Dr. Trina Birmingham aware of last heparin at 0500 today, ok to proceed. Received pt to CT1.   Pt ve

## 2021-03-25 NOTE — PROCEDURES
1.  Upper abd fluid collection s/p 10F pigtail. Lt brown thick purulent material, sample to lab. 2.  Lower abd fluid collection s/p 10F pigtail. Similar character of fluid, separate sample to lab. Comp-none.

## 2021-03-25 NOTE — IMAGING NOTE
Per Selam Monzon inpatient RN, Anya Shineannah is alert and oriented, consentable and NPO since MN with no heparin given since am dose today. Abscess drain placement scheduled for 1630. Dr. Chris Manuel aware of last heparin at 0500 today, ok to proceed. Received pt to CT1.   Pt ve

## 2021-03-25 NOTE — PROGRESS NOTES
Vss. Pt resting in bed this am. Pt on ra with 02 sats wnl. Lungs cta. Pt denies difficulty breathing. Pt c/o chest pain but states that is his baseline with his sarcoidosis. Pt remains npo for IR drainage this afternoon. Abdomen rounded but soft.  Ileostomy

## 2021-03-25 NOTE — DIETARY NOTE
Nutrition Short Note   Calorie count started - envelope hanging on door and will be monitored daily by RD. NPO drainage of intra-abdominal abscess today. Plan to advance back to diet per RN. RD to continue to monitor.   Diet order: General + Ensure Clear BI

## 2021-03-25 NOTE — PROGRESS NOTES
Pt resting in bed. Pt continues to rate pain as a 8 on a 1-10 scale. Dilaudid given with some relief. Abdomen soft but rounded. Loose brown stool draining in colostomy appliance. jay drain draining milky/brown/tan drainage.  Pt HR remains elevated in 110-120

## 2021-03-25 NOTE — PROGRESS NOTES
[facility]  Progress Note     Simael Vicente Patient Status:  Observation    1965 MRN [id number]   [facility] 8NE-A Attending Dr. Eugenia Cabrera Day # 39 PCP Neel Fuentes MD     Chief Complaint: Chest pain, SOB    S: Pt 15  --  10*   ALT 17  --  15*  --  18   BILT 0.3  --  0.4  --  0.5   TP 5.4*  --  5.9*  --  5.6*    < > = values in this interval not displayed. Estimated Creatinine Clearance: 197 mL/min (A) (based on SCr of 0.41 mg/dL (L)).     Recent Labs   Lab 03/2 slowly  5. inhalers  6. Pulm discussed with [address] [place] [address], patient will need follow up there  6. Hypertension, currently low and BP meds held  7. Hyperlipidemia  1. Statin and fenofibrate  8. GERD  9. JEANETTE on CPAP  10. Anemia, partly dilutional, stable  11.  U

## 2021-03-25 NOTE — CONSULTS
INFECTIOUS DISEASE CONSULT NOTE    Sonido Kole Patient Status:  Inpatient    1965 MRN [id number]   [facility] 3NW-A Attending Kailey Kramer MD   [facility] Day # 39 PCP Nuha Gimenez the body/tail of the pancreas suspicious for abscess, as well as a larger collection of fluid and air throughout the mid abdomen extending into the right pelvis suspicious for abscess, measuring up to 14.1 cm in size and a linear loculated collection of fl SURGICAL HISTORY      kidney stones   • TOTAL ABDOMINAL COLECTOMY, END ILEOSTOMY N/A 3/7/2021    Performed by Gaila Severs, MD at [facility] MAIN OR     Family History   Problem Relation Age of Onset   • Heart Disorder Father    • Stroke Father    • Other Q8H [facility]  •  PEG 3350 (MIRALAX) powder packet 17 g, 17 g, Oral, Daily PRN  •  magnesium hydroxide (MILK OF MAGNESIA) 400 MG/5ML suspension 30 mL, 30 mL, Oral, Daily PRN  •  bisacodyl (DULCOLAX) rectal suppository 10 mg, 10 mg, Rectal, Daily PRN  •  Fleet En oriented x 3. Vital signs: Blood pressure 117/72, pulse 105, temperature 97.8 °F (36.6 °C), temperature source Oral, resp. rate 18, height 5' 8\" (1.727 m), weight 155 lb 12.8 oz (70.7 kg), SpO2 97 %. HEENT: no scleral icterus or conjunctival injection. input(s): Gen Dominguez, [address], [address], [address], [address], [address] [facility], [facility], [facility] [address], [facility] [address], [address], NITRITE, LEUUR, WBCUR, RBCUR, BACUR, EPIUR in the last 168 hours. Microbiology    Reviewed in EMR,   Hospital Encounter on 02/10/21   1.  BLOOD CULTU COMPARISON:  [facility] , CT, CT ANGIOGRAPHY, CHEST (CPT=71275), 2/11/2021, 4:42 PM.  INDICATIONS:  elevated d-dimer  TECHNIQUE:  IV contrast-enhanced multislice CT angiography is performed through the pulmonary arterial anatomy.  3D volume renderings are gener ABDOMEN&MRCP W/3D (QBV=[id number]), 3/01/2021, 6:38 PM.  INDICATIONS:  9/10 abdominal pain  TECHNIQUE:  Unenhanced multislice CT scanning was performed from the dome of the diaphragm to the pubic symphysis. Dose reduction techniques were used.  Dose infor 1. There is now a large amount of FREE INTRAPERITONEAL AIR concerning for perforation of hollow viscus. No specific signature of the air is present to indicate the source of the air, and surgical consultation is advised.   2. Increasing features of pancrea a large loculated abscess. This is difficult to accurately measure. In the coronal plane, this measures approximately 14.1 x 7.4 cm (series 602, image 48).   There is a linear loculated collection of fluid and air within the right pericolic gutter suspici colectomy with Nuvia's pouch formation and a right lower quadrant ileostomy. 3. Please see above for further details.     Dictated by (CST): Jay Ochoa MD on 3/24/2021 at 3:07 PM     Finalized by (CST): Jay Ochoa MD on 3/24/2021 at 3:25 PM in the right anterior pararenal space. The overall amount of fluid is not safely change since previous study. There is no evidence of rounded enhancement or air to suggest an abscess. SPLEEN:  No enlargement or focal lesion.   KIDNEYS:  No mass, obstructi previous study. There is no evidence of abscess. 3. Findings of a adynamic ileus of the small bowel.    Dictated by (CST): Ana Cristina Galindo MD on 3/13/2021 at 11:37 AM     Finalized by (CST): Ana Cristina Galindo MD on 3/13/2021 at 12:04 PM       CT ABDOMEN+PELVIS( atherosclerosis. RETROPERITONEUM:  Unremarkable. BOWEL/MESENTERY:  Nondilated appendix. Small duodenal diverticulum. ABDOMINAL WALL:  Unremarkable. PELVIC ORGANS:  Decompressed bladder.  LYMPH NODES:  There are mildly prominent central mesenteric lymph nod Dictated by (CST): Elizabeth Loera MD on 3/10/2021 at 8:09 AM     Finalized by (CST): Elizabeth Loera MD on 3/10/2021 at 8:10 AM             Result Date: 3/10/2021  PROCEDURE:  MRI ABDOMEN&MRCP W/3D (UUN=[id number])  COMPARISON:  [facility] , CT, CT ABD pancreatic head and uncinate process extending in a caudal direction is noted. This may be a result the pancreatitis. There is a mild amount of mesenteric edema associated with this that has fluid that extends towards the right pericolic gutter.   However Rule out pericardial effusion. Obstructive sleep apnea. Sarcoidosis. Risk factors:  Hypertension.  Dyslipidemia. ---------------------------------------------------------------------------- Procedure information:  A transthoracic complete 2D study was perf Right atrium:  The atrium was normal in size. Mitral valve:   Mildly calcified annulus. Leaflet separation was normal. Doppler:  Transvalvular velocity was within the normal range. There was no evidence for stenosis. There was trivial regurgitation.  Aortic 0.9   cm     0.6 - 1.0   Aorta                                   Value        Reference  Aortic root ID, ED                      3.9   cm     <4.2  Ascending aorta ID, A-P, ED             3.7   cm     ---------   Left atrium PORTABLE  (CPT=71045)  TECHNIQUE:  AP chest radiograph was obtained.   COMPARISON:  [facility] , XR, XR CHEST AP PORTABLE  (CPT=71045), 3/07/2021, 12:11 PM.  INDICATIONS:  Removed IJ line  PATIENT STATED HISTORY: (As transcribed by Technologist)    Removed IJ l with the tip seen to the level of the proximal stomach. There has been interval placement of a right IJ central venous catheter with the tip seen at the RA/SVC junction, also adequately positioned.   There is mild coarsening of the lung interstitium diffus Lidocaine local anesthetic was applied. The internal jugular vein was punctured with a micro-access needle using direct real-time ultrasound guidance. A guidewire was positioned in the superior vena cava.    Over a guidewire, a series of fascial dilators care of this patient. Please do not hesitate to call if you have any questions. I will continue to follow with you and will make further recommendations based on his progress.     Rosemary Tijerina  3/25/2021  8:50 AM

## 2021-03-26 PROCEDURE — 99232 SBSQ HOSP IP/OBS MODERATE 35: CPT | Performed by: INTERNAL MEDICINE

## 2021-03-26 NOTE — DIETARY NOTE
Nutrition Short Note   Calorie count started - envelope hanging on door and will be monitored daily by RD. Pt expressed he does not want to go back on TPN. Trying to eat at meals. Nutrient dense foods discussed. Encouraged usage of ONS BID.  RD to continue

## 2021-03-26 NOTE — PLAN OF CARE
Pt is A&O, VSS, with moderate c/o pain to abdomen- medication given. Pt is on RA with bilateral diminished lung sounds. IS at bedside encouraged. St on Telemetry (100-120s). Abdomen is soft and nontender with active bowel sounds. Midline incision healing. from fall injury  Description: INTERVENTIONS:  - Assess pt frequently for physical needs  - Identify cognitive and physical deficits and behaviors that affect risk of falls.   - [place] fall precautions as indicated by assessment.  - Educate pt/family on Spirometry  - Assess the need for suctioning and perform as needed  - Assess and instruct to report SOB or any respiratory difficulty  - Respiratory Therapy support as indicated  - Manage/alleviate anxiety  - Monitor for signs/symptoms of CO2 retention  Yvonne Barlow Monitor percentage of each meal consumed  - Identify factors contributing to decreased intake, treat as appropriate  - Assist with meals as needed  - Monitor I&O, WT and lab values  - Obtain nutritional consult as needed  - Optimize oral hygiene and moistu

## 2021-03-26 NOTE — CM/SW NOTE
MSW messaged  liaison to discuss PMR re-eval.  MSW also met with the patient and spouse at bedside to discuss a back up plan to Leonor Russell for PARMJIT. List generated from [address] printed and provided to patient and spouse.    Patient was understanding but his wife wa

## 2021-03-26 NOTE — TREATMENT PLAN
Leucocytosis , Lactic 3.8, pt hypotensive on IV Abx- will order sepsis bolus, IVF and trend lactic acid.

## 2021-03-26 NOTE — PROGRESS NOTES
[facility]  Progress Note      Marlene Lee Patient Status:  Inpatient    1965 MRN [id number]   [facility] 3NW-A Attending Damian Decker MD   [facility] Day # 43 PCP Marisue Felty, MD     54year-old male with abdominal fluid

## 2021-03-26 NOTE — PROGRESS NOTES
SUBJECTIVE: Poor poor intake. On mecca count and possible TPN    CC: ADL MOB DYS status post subtotal colectomy with end ileostomy for C. difficile induced toxic megacolon  Intra-abdominal abscess  Pancreatic necrosis  Interval History: On RA.  Right Gaurang, supr Oral 105 19 95 % —   03/25/21 1805 106/80 — — 114 18 94 % —   03/25/21 1800 (!) 109/95 — — 116 19 98 % —   03/25/21 1755 122/79 — — 117 19 98 % —   03/25/21 1750 106/81 — — 118 22 96 % —   03/25/21 1745 101/71 — — 116 17 100 % —   03/25/21 1740 109/75 — — kidney injury) ([facility] [address]    Hyponatremia    Adjustment disorder with mixed anxiety and depressed mood    Pancreatic mass    Ileus (HCC)    Drug-induced constipation    C. difficile colitis    Toxic megacolon due to Clostridium difficile    Bowel perforation (H

## 2021-03-26 NOTE — PAYOR COMM NOTE
--------------  CONTINUED STAY REVIEW    Payor: JAD PPO  Subscriber #:  [id number]  Authorization Number: [id number]    Admit date: 2/12/21  Admit time:  2:21 PM    Admitting Physician: Arianna Crespo MD  Attending Physician:  MD SELIN Sheth HCl (VERSED) 2 MG/2ML injection 1 mg     Date Action Dose Route User    3/25/2021 1728 Given 0.5 mg Intravenous Payam Antony RN    3/25/2021 1721 Given by Other 0.5 mg Intravenous Payam Antony RN    3/25/2021 1711 Given 1 mg Intravenous Shelly Dimas Given 125 mg Oral Carol Tonny, RN    3/25/2021 1338 Given 125 mg Oral Carol Tonny RN            Plan: 3/26  POD 19 status post subtotal colectomy with end ileostomy for C. difficile induced toxic megacolon  Intra-abdominal abscess  Pancreatic ne calorie count underway but may need to restart TPN if unable to meet goals. - follow temps and wbc  - monitor resp status  - recommend removal of R IJ TLC- D/w RN. Patient has midline currently. If additional access is needed and unable to be obtained.  Wo

## 2021-03-26 NOTE — DIETARY NOTE
[facility]    NUTRITION ASSESSMENT    Pt does not meet malnutrition criteria. NUTRITION INTERVENTION:    1. Meal and Snacks - monitor patient po intake. Encourage adequate po of appropriate diet once diet advances.   2. Medical Food Supplements - En advances.     3/2- Pt NPO. BM this morning. Pt reports appetite has decreased and feeling nauseous. Pt is concerned about weight loss and interested in trying ONS once diet advances and if able to tolerate.  Will monitor and add ONS if not meeting needs by FOOD/NUTRITION RELATED HISTORY:   Appetite: Poor  Intake: <50%  Intake Meeting Needs: No  Food Allergies: No  Cultural/Ethnic/Buddhism Preferences Addresses: Yes    GI SYSTEM REVIEW: WNL    NUTRITION RELATED PHYSICAL FINDINGS:     1. Body Fat/Muscle M

## 2021-03-26 NOTE — PLAN OF CARE
Pt a&o x 4. Reports fatigue  Maintaining sats on ra. Marky precautions cont. Pt noted w/ non productive cough, reports 'it's the same'  Replacing potassium per electrolyte protocol  Voiding w/out difficulty  Appetite poor. Surgical pa aware.    Calorie c patient reports new pain  - Anticipate increased pain with activity and pre-medicate as appropriate  Outcome: Progressing     Problem: SAFETY ADULT - FALL  Goal: Free from fall injury  Description: INTERVENTIONS:  - Assess pt frequently for physical needs based on oxygen saturation or ABGs  - Provide Smoking Cessation handout, if applicable  - Encourage broncho-pulmonary hygiene including cough, deep breathe, Incentive Spirometry  - Assess the need for suctioning and perform as needed  - Assess and instruct pharmacy to review patient's medication profile  Outcome: Progressing  Goal: Maintains adequate nutritional intake (undernourished)  Description: INTERVENTIONS:  - Monitor percentage of each meal consumed  - Identify factors contributing to decreased intak

## 2021-03-26 NOTE — PROGRESS NOTES
INFECTIOUS DISEASE PROGRESS NOTE    Martha Brito Patient Status:  Inpatient    1965 MRN [id number]   [facility] 3NW-A Attending Angella Rodriguez MD   [facility] Day # 43 GODWIN Garcia Oral, Daily PRN  •  magnesium hydroxide (MILK OF MAGNESIA) 400 MG/5ML suspension 30 mL, 30 mL, Oral, Daily PRN  •  bisacodyl (DULCOLAX) rectal suppository 10 mg, 10 mg, Rectal, Daily PRN  •  Fleet Enema (FLEET) 7-19 GM/118ML enema 133 mL, 1 enema, Rectal, drains on L with purulent appearing drainage noted. Musculoskeletal: Full range of motion of all extremities. No arthritis or deformity  Integument: No rash. No open wounds.     Laboratory Data:    Recent Labs   Lab 03/22/21  [id number] 03/24/21  0459 03/25/21 8:20 PM    Specimen: Bld,Picc Line; Blood   Result Value Ref Range    Blood Culture Result No Growth 5 Days N/A   3. TISSUE AEROBIC CULTURE     Status: None    Collection Time: 02/16/21  8:20 AM    Specimen: TBNA Lymph node 11L; Fine needle aspirate   Resu information is transmitted to the ACR ([facility]) NRDR ([address]) which includes the Dose Index Registry. PATIENT STATED HISTORY:(As transcribed by Technologist)  Chest pain since 2AM today.    CONTRAST USED:  10 of Radiology) NRDR ([address]) which includes the Dose Index Registry. PATIENT STATED HISTORY: (As transcribed by Technologist)  Abdominal pain in all four quadrants. Recent colonoscopy.     FINDINGS:   Preliminary reading provided b and fluid in the right abdomen as described above. 3. No bowel obstruction seen. 4. Increasing atelectasis at the lung base. Other findings as above.    Dictated by (CST): Rohan Marquez MD on 3/07/2021 at 9:06 AM     Finalized by (CST): Rohan Marquez, proximity to a portion of the surgical drain. There is a small amount of loculated fluid extending superiorly and posteriorly at the right margin of the celiac artery origin. ADRENALS:  Unremarkable. KIDNEYS:  Unremarkable.  AORTA/VASCULAR:  Scattered athe ONLY)(CPT=74177)    Result Date: 3/13/2021  PROCEDURE:  CT ABDOMEN+PELVIS (CONTRAST ONLY) (CPT=74177)  COMPARISON:  [facility] , XR, XR ABDOMEN (1 VIEW) (CPT=74018), 3/06/2021, 11:50 AM.  [facility] , MR, MRI ABDOMEN&MRCP W/3D (GJG=05668/27382), 3/01/2021, 6:38 PM in the right upper pole of the kidney. ADRENALS:  No mass or enlargement. AORTA/VASCULAR:  No aneurysm or dissection. The portal vein is patent. The superior mesenteric vein, splenic vein, SMA are patent. RETROPERITONEUM:  No mass or adenopathy.   BOWEL/ ONLY)(CPT=74177)    Result Date: 2/28/2021  PROCEDURE:  CT ABDOMEN+PELVIS (CONTRAST ONLY) (CPT=74177)  COMPARISON:  [facility] , CT, CT ANGIOGRAPHY, CHEST (CPT=71275), 2/11/2021, 4:42 PM.  [facility] , CT, CT ANGIOGRAPHY, CHEST (CPT=71275), 2/26/2021, 7:32 AM.  PL Degenerative changes in the spine. OTHER:  There is extensive soft tissue attenuation encasing the distal duodenum/proximal jejunum (series 4, image 48). There is heterogeneous fat stranding within the central mesentery.   There is fluid and fat stranding ABDOMEN+PELVIS(CONTRAST ONLY)(CPT=74177), 2/28/2021, 4:35 PM.  INDICATIONS:  r/o pancreatic mass  TECHNIQUE:    Multiplanar single shot fast spin echo, chemical shift imaging, breath hold T2 weighted images and 2-D fiesta sequences were acquired.  No contra However, spiculated region just caudal to the pancreatic head that is enlarged with a possible mass is identified. 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   This is difficult to fully delin was performed. Additional evaluation included M-mode, complete spectral Doppler, and color Doppler. Inpatient. Room 8621. Comparison was made to the study of 08/20/2020. This was a routine echocardiographic study.  Transthoracic echocardiography for vent regurgitation. Aortic valve:   Structurally normal valve. Trileaflet. Cusp separation was normal.  Doppler:  Transvalvular velocity was within the normal range. There was no stenosis. No significant regurgitation.  Tricuspid valve:   Structurally normal va Value        Reference  LA volume, S                    (H)     63    ml     18 - 58  LA volume/bsa, S                (H)     30    ml/m^2 16 - 28  LA volume, ES, 1-p A4C          (H)     71    ml     18 - 58  LA volume/bsa, ES, 1-p Technologist)    Removed IJ line    FINDINGS:  Interval removal endotracheal tube, NG tube, and right central line. No pneumothorax.   Minimally increased patchy interstitial and airspace disease scattered in the bilateral lungs, again most pronounced in t the lung interstitium diffusely which is similar to the previous study. There is a suboptimal inspiration with minimal bibasilar subsegmental atelectasis. Pulmonary vasculature is within normal limits. CONCLUSION:  1.  There is been interval pl series of fascial dilators were used to dilate the tract. The catheter was positioned at the caval-atrial junction using fluoroscopic guidance. The catheter was secured to the skin and a sterile dressing was applied over the operative field.   The lumen f has midline currently. If additional access is needed and unable to be obtained. Would recommend contacting vascular access team (PICC ok from ID standpoint if needed). Discussed with surgery PA, RN and Dr. David Alicea. Jann Villalba Junior, PA-C    ID AT

## 2021-03-26 NOTE — PHYSICAL THERAPY NOTE
PHYSICAL THERAPY TREATMENT NOTE - INPATIENT    Room Number: 325/325-A     Session: 13  Number of Visits to Meet Established Goals: 10    Presenting Problem: s/p total abdoinal colectomy/ileostomy 3/7     History related to current admission:   Hx dottie Past Surgical History  Past Surgical History:   Procedure Laterality Date   • BIOPSY/REMOVAL, LYMPH NODE(S) Bilateral 2013    Lymph node removal (neck)   • BRONCHOSCOPY N/A 2/16/2021    Performed by Drena Hammans, MD at [facility] ENDOSCOPY   • Eva Tavarez '6-Clicks' INPATIENT SHORT FORM - BASIC MOBILITY  How much difficulty does the patient currently have. ..  -   Turning over in bed (including adjusting bedclothes, sheets and blankets)?: None   -   Sitting down on and standing up from a chair with arms (e.g pain  , s/p CT guided drainage of intra-abdominal abscess 3/25/21.  Pt is motivated to participate in skilled therapy and presents with impaired strength, balance and decreased endurance below PLOF and will continue to benefit from ongoing IP PT to maximize

## 2021-03-26 NOTE — PROGRESS NOTES
[address] [facility] Patient Status:  [facility]    1965 MRN [id number]   [facility] 3NW-A Attending Julito Love MD   [facility] Day # 43 PCP Dolores Pickett MD     Pulm / Critical Care Progress Note     S: No acute even shifts: In: 350 [I.V.:350]  Out: 2673 [Urine:1500; Drains:470; VIDUZ:6044]  I/O this shift: In: 4427.3 [I.V.:4327.3; IV PIGGYBACK:100]  Out: 510 [Urine:350; Drains:160]     Physical Exam:   General: alert, cooperative,  No respiratory distress.    Head: N cultures negative   -steroids/cellcept as above  -outpatient follow up with [facility] access hospital [facility]  4. Pancreatic mass/duodenal/jejunal inflammation: pancreas now more c/w pancreatitis with focal necrosis. Increasing abd pain as transitioning off iv pain meds.  Ct c/w

## 2021-03-26 NOTE — PROGRESS NOTES
[facility]  Progress Note    Budd Route Patient Status:  Inpatient    1965 MRN [id number]   [facility] 3NW-A Attending Aliya Miller MD   [facility] Day # 43 PCP Carlos Masters MD     Subjective:   The patient denies new comp nonintractable headache     Myalgia     Arthralgia     Wheezing     Cough     Chest pain of uncertain etiology     Body aches     Gastroesophageal reflux disease     Throat pain     NAFLD (nonalcoholic fatty liver disease)     Hypokalemia     Leukocytosis supplement shakes  -Continue percutaneous drains. Await drain fluid culture.         Nilam Funes MD   [facility]  3/26/2021  8:44 PM

## 2021-03-26 NOTE — VASCULAR ACCESS
Spoke with pt's nurse, concerning PICC line order. Nurse, Terri Floyd states that she believes PICC line insertion is on hold at this time. Ines Orellana to call me if PICC line insertion is needed today.

## 2021-03-27 ENCOUNTER — APPOINTMENT (OUTPATIENT)
Dept: ULTRASOUND IMAGING | Facility: HOSPITAL | Age: 56
DRG: 981 | End: 2021-03-27
Attending: INTERNAL MEDICINE
Payer: COMMERCIAL

## 2021-03-27 PROCEDURE — 99232 SBSQ HOSP IP/OBS MODERATE 35: CPT | Performed by: INTERNAL MEDICINE

## 2021-03-27 PROCEDURE — 93970 EXTREMITY STUDY: CPT | Performed by: INTERNAL MEDICINE

## 2021-03-27 RX ORDER — VANCOMYCIN 2 GRAM/500 ML IN 0.9 % SODIUM CHLORIDE INTRAVENOUS
25 ONCE
Status: COMPLETED | OUTPATIENT
Start: 2021-03-27 | End: 2021-03-27

## 2021-03-27 RX ORDER — VANCOMYCIN HYDROCHLORIDE
15 EVERY 12 HOURS
Status: DISCONTINUED | OUTPATIENT
Start: 2021-03-27 | End: 2021-03-28

## 2021-03-27 RX ORDER — MAGNESIUM OXIDE 400 MG (241.3 MG MAGNESIUM) TABLET
400 TABLET ONCE
Status: COMPLETED | OUTPATIENT
Start: 2021-03-27 | End: 2021-03-27

## 2021-03-27 NOTE — PLAN OF CARE
Pt a&ox4, afebrile. Room air. Tele monitoring - NSR/ST with movement (80's-110's). Lungs diminished bilaterally, pt with non productive cough. Voiding freely. Poor appetite. Bilateral lower extremity dopplers pending.   Pt states his legs are not cramping a Utilize distraction and/or relaxation techniques  - Monitor for opioid side effects  - Notify MD/LIP if interventions unsuccessful or patient reports new pain  - Anticipate increased pain with activity and pre-medicate as appropriate  Outcome: Progressing for changes in mentation and behavior  - Position to facilitate oxygenation and minimize respiratory effort  - Oxygen supplementation based on oxygen saturation or ABGs  - Provide Smoking Cessation handout, if applicable  - Encourage broncho-pulmonary hygi mobilization and activity  - Obtain nutritional consult as needed  - Establish a toileting routine/schedule  - Consider collaborating with pharmacy to review patient's medication profile  Outcome: Progressing  Goal: Maintains adequate nutritional intake (u

## 2021-03-27 NOTE — PROGRESS NOTES
[facility]  Progress Note    Alanis Rod Patient Status:  Inpatient    1965 MRN [id number]   [facility] 3NW-A Attending Niki Carpio MD   [facility] Day # 37 PCP Zion Lang MD     Subjective:   The patient is lying comfor Leg swelling     Right low back pain     Diverticulosis of large intestine without perforation or abscess without bleeding     Second degree hemorrhoids     Acute gastritis     Bursitis of left shoulder     Biceps tendonitis, left     Rotator cuff tendonit purulent fluid. Midline incision intact. Ileostomy healthy and functional.    -Continue diet as tolerated.   -Continue percutaneous drain management  -Antibiotics per MARCO Correa MD   [facility]  3/27/2021  2:08 PM

## 2021-03-27 NOTE — PROGRESS NOTES
INFECTIOUS DISEASE PROGRESS NOTE    Jazz Bright Patient Status:  Inpatient    1965 MRN [id number]   [facility] 3NW-A Attending Addie Akins MD   [facility] Day # 37 PCP Temi Denise mg, 100 mg, Oral, TID  •  Heparin Sodium (Porcine) 5000 UNIT/ML injection 5,000 Units, 5,000 Units, Subcutaneous, Q8H RONALD  •  PEG 3350 (MIRALAX) powder packet 17 g, 17 g, Oral, Daily PRN  •  magnesium hydroxide (MILK OF MAGNESIA) 400 MG/5ML suspension 30 m rhythm. No murmurs. Abdomen:  some tenderness diffusely to palpation but soft, nondistended. Positive bowel sounds. Midline incision healed. ileostomy bag with scant output.  Drain with purulent appearing drianage on R. 2 drains on L with purulent appear Collection Time: 03/25/21  5:20 PM    Specimen: Abdomen;  Other   Result Value Ref Range    Aerobic Culture Result 4+ growth Escherichia coli (A) N/A    Aerobic Smear 2+ WBCs seen N/A    Aerobic Smear 2+ Gram Negative Rods N/A       Susceptibility    Escher significant. CONCLUSION:  Extensive gaseous distension/dilatation of the transverse colon measuring up to 10.7 cm. The distal aspect of the colon is decompressed. Moderate retained feces throughout the colon.    Dictated by (CST): Antonia Koehler, are noted. Patchy ground-glass opacities are relatively stable. This may be partially due to fluid overload.     Dictated by (CST): Rahda Howe MD on 2/26/2021 at 7:51 AM     Finalized by (CST): Radha Howe MD on 2/26/2021 at 8:04 AM       C thickness 3.6 cm AP dimension image 82 anterior to the right psoas muscle. No developing hydronephrosis. No aortic aneurysm. Stable size of spleen and liver. Cholecystectomy has been performed. Urinary bladder appears unremarkable.   Moderate atelectas the lung bases, mildly improved since 3/13/2021. LIVER:  Unremarkable. BILIARY:  Cholecystectomy. SPLEEN:  Unremarkable.  PANCREAS:  Along the undersurface of the pancreatic body, there is a loculated collection of fluid and air measuring 7.5 x 7.7 cm suspi of fluid and air throughout the mid abdomen extending into the right pelvis suspicious for abscess. This collection measures up to 14.1 cm in size.   There is a linear loculated collection of fluid and air within the right pericolic gutter suspicious for a biliary ductal dilatation. PANCREAS:  The masslike lesion within the pancreatic head has decreased in size and now it represents an area of hypo enhancement within the head measuring 16 x 12 mm which likely represents an area of pancreatic necrosis.   There gastrohepatic ligament. There is a drain extending from the left abdomen with the tip in the midline pelvis. .             CONCLUSION:   1.  Findings of focal hyper perfusion in the pancreatic head in the area of previous masslike density may represent an a image 55 of series 2, there is a 2.8 x 2.2 cm mass arising from the ventral head of the pancreas. There is extensive inflammatory change seen at the inferior margin of the pancreatic head/body. No pancreatic duct dilatation is identified.  ADRENALS:  Mariely Collin above for further details. Critical results were discussed with Cassi GUTIERREZ at 1717 hours on 2/28/2021. Critical results were read back.     Dictated by (CST): Ninfa Golden MD on 2/28/2021 at 4:53 PM     Finalized by (CST): Ninfa Golden MD on 2/28/2 entirely excluded. SPLEEN:  No enlargement or focal lesion. KIDNEYS:  A representative renal parapelvic cyst measures 1 cm. Renal collecting systems are not dilated. ADRENALS:  No mass or enlargement. AORTA/VASCULAR:  No aneurysm or dissection.   RETRO ---------------------------------------------------------------------------- Transthoracic Echocardiogram Name:Jackie Coe Date: 2021 :  1965 Ht:  (68in)  BP: 133 / 74 MRN:  [id number]    Age:  55years    Wt:  (204lb) HR: 77bpm Loc:  [facility] ---------------------------------------------------------------------------- * Left ventricle: The cavity size was normal. Wall thickness was normal. Systolic function was normal. The estimated ejection fraction was 55-60%.  Doppler parameters are consiste shortening, PLAX                  33    %      25 - 43  LV mid-wall fx shortening, PLAX         15    %      14 - 22  LV PW thickness, ED, PLAX               0.9   cm     0.6 - 1.0  IVS/LV PW ratio, ED, PLAX               1.06         ---------  LV ejectio WBC  PATIENT STATED HISTORY: (As transcribed by Technologist)  Patient offered no additional history at this time. CONCLUSION:  Interval improvement in bilateral pulmonary interstitial and alveolar infiltrates/edema. Stable heart size.   Central radiograph was obtained.   COMPARISON:  [facility] , CT, CT ANGIOGRAPHY, CHEST (CPT=71275), 2/26/2021, 7:32 AM.  [facility] , XR, XR CHEST AP PORTABLE  (CPT=71045), 2/10/2021, 7:34 PM.  INDICATIONS:  s/p cvc and ett placement  PATIENT STATED HISTORY: (As transcribe neck to assess the patency and size of both internal jugular veins, and the relationship of the jugular veins to the carotid arteries. Permanent images were stored in the PACS system. An ultrasound was perform with sterile gel & sterile probe covers.   Af C. diff. Progress will be difficult to eval as pt now s/p ileostomy- diarrhea not unexpected. 3. Leukocytosis- due to above. Improving. 4. Sarcoidosis on immunosuppression  5.  Pancreatic mass vs pancreatitis with focal necrosis- will need to repeat imagi

## 2021-03-27 NOTE — PLAN OF CARE
Pt a&o x 4. Cheerful & cooperative w/ care  Reports improvement with appetite. Ensure encouraged  Voiding w/out difficulty  Ileostomy producing liquid brown stool. Appliance cdi. Ir drains flushed w/ 10cc nss as ordered.   Midline incision healed including physical limitations  - Instruct pt to call for assistance with activity based on assessment  - Modify environment to reduce risk of injury  - Provide assistive devices as appropriate  - Consider OT/PT consult to assist with strengthening/mobilit Progressing     Problem: METABOLIC/FLUID AND ELECTROLYTES - ADULT  Goal: Electrolytes maintained within normal limits  Description: INTERVENTIONS:  - Monitor labs and rhythm and assess patient for signs and symptoms of electrolyte imbalances  - Administer Encourage food from home; allow for food preferences  - Enhance eating environment  Outcome: Progressing     Problem: Patient/Family Goals  Goal: Patient/Family Long Term Goal  Description: Patient's Long Term Goal: discharge    Interventions:  - comply wi

## 2021-03-27 NOTE — PROGRESS NOTES
[facility] [facility]:  [facility]    1965 MRN [id number]   [facility] 3NW-A Attending Kailey Kramer MD   [facility] Day # 37 PCP Magy Hollingsworth MD     Pulm / Critical Care Progress Note     S: No acute even (92.2 kg)  12/14/20 : 200 lb (90.7 kg)       I/O last 3 completed shifts: In: 6824.1 [P.O.:265; I.V.:6209.1; IV PIGGYBACK:350]  Out: 7686 [Urine:2325; Drains:635; Stool:900]  I/O this shift:   In: 477 [P.O.:360; I.V.:351]  Out: 540 [Urine:450; Drains:40; S scans have shown adenopathy and groundglass opacities. Bronch/EBUS done 2/16--lymph node bx limited, cultures negative   -steroids/cellcept as above  -outpatient follow up with [address] [place] [address]  4.  Pancreatic mass/duodenal/jejunal inflammation: pancreas now mor

## 2021-03-27 NOTE — PROGRESS NOTES
[facility]  Progress Note     Veronica Hogue Patient Status:  Observation    1965 MRN [id number]   [facility] 8NE-A Attending Dr. Janice Caballero Day # 37 PCP Collins Ochoa MD     Chief Complaint: Chest pain, SOB    S: Pt ALKPHO 128*  --  116 106  --    AST 15  --  10* 13*  --    ALT 15*  --  18 28  --    BILT 0.4  --  0.5 0.6  --    TP 5.9*  --  5.6* 5.0*  --     < > = values in this interval not displayed.      Estimated Creatinine Clearance: 336.5 mL/min (A) (based on S Final AFB and fungus cultures so far NGTD  3. PJP negative, bactrim ppx, decadron  4. Home Plaquenil discontinued and cellcept on hold  5. inhalers  6. Pulm discussed with [facility], patient will need follow up there  6.  Hypertension, currently low and B

## 2021-03-27 NOTE — CONSULTS
[facility] Dosing Service    Initial Pharmacokinetic Consult for Vancomycin Dosing     Jim Gallegos is a 54year old patient who is being treated for intra-abdominal abscesses.   Pharmacy has been asked to dose Vancomycin by  SHAKA Delgado, I 2.  BLOOD CULTURE     Status: None (Preliminary result)    Collection Time: 03/25/21  1:39 PM    Specimen: Bld, Triple Lumen Line; Blood   Result Value Ref Range    Blood Culture Result No Growth 1 Day N/A   3. TISSUE AEROBIC CULTURE     Status: None    C

## 2021-03-28 PROCEDURE — 99232 SBSQ HOSP IP/OBS MODERATE 35: CPT | Performed by: INTERNAL MEDICINE

## 2021-03-28 RX ORDER — MAGNESIUM OXIDE 400 MG (241.3 MG MAGNESIUM) TABLET
800 TABLET ONCE
Status: DISCONTINUED | OUTPATIENT
Start: 2021-03-28 | End: 2021-03-28

## 2021-03-28 RX ORDER — MAGNESIUM OXIDE 400 MG (241.3 MG MAGNESIUM) TABLET
800 TABLET ONCE
Status: COMPLETED | OUTPATIENT
Start: 2021-03-28 | End: 2021-03-28

## 2021-03-28 NOTE — PROGRESS NOTES
[facility]  Progress Note     Dwain Saurabhmiracle Patient Status:  Observation    1965 MRN [id number]   [facility] 8NE-A Attending Dr. Elías Blair Day # 40 PCP Sha Ochoa MD     Chief Complaint: Chest pain, SOB    S: Pt 2.1*  --  1.7*  --   --    *  --  134*  --  135* 134*  --    K 4.3  --  4.3  --  3.4* 4.0  4.0  --      --  101  --  104 103  --    CO2 25.0  --  26.0  --  22.0 24.0  --    ALKPHO 128*  --  116  --  106  --   --    AST 15  --  10*  --  13*  -- sarcoid flare with immunosuppresants and high dose steroids, trial of colchicine w/o improvement. 2. S/p bronch, BAL and EBUS 2/16. Negative for malignancy. Cx so far negative. Final AFB and fungus cultures so far NGTD  3.  PJP negative, bactrim ppx, deca

## 2021-03-28 NOTE — PROGRESS NOTES
[facility]  Progress Note      Tong Gaytan Patient Status:  Inpatient    1965 MRN [id number]   [facility] 3NW-A Attending Landen Singh MD   [facility] Day # 40 PCP Derick [place] MD     54year-old male with abdominal fluid

## 2021-03-28 NOTE — PROGRESS NOTES
INFECTIOUS DISEASE PROGRESS NOTE    Brianna Ramirez Patient Status:  Inpatient    1965 MRN [id number]   [facility] 3NW-A Attending Pennie Martines MD   [facility] Day # 40 PCP Deepa Duran MAGNESIA) 400 MG/5ML suspension 30 mL, 30 mL, Oral, Daily PRN  •  bisacodyl (DULCOLAX) rectal suppository 10 mg, 10 mg, Rectal, Daily PRN  •  Fleet Enema (FLEET) 7-19 GM/118ML enema 133 mL, 1 enema, Rectal, Once PRN  •  Sulfamethoxazole-TMP DS (BACTRIM DS) on R. 2 drains on L with purulent drainage . Musculoskeletal: Full range of motion of all extremities. No arthritis or deformity  Integument: No rash. No open wounds.     Laboratory Data:    Recent Labs   Lab 03/25/21  2487 03/26/21  3087 03/27/21  7381 Result Value Ref Range    Aerobic Culture Result 4+ growth Escherichia coli (A) N/A    Aerobic Culture Result 2+ growth Enterococcus faecium (A) N/A    Aerobic Smear 2+ WBCs seen N/A    Aerobic Smear 2+ Gram Negative Rods N/A       Susceptibility    [facility] necrosis- will need to repeat imaging down the road    PLAN:  - will transition to tygacil to cover both organisms in cx, will also cover anaerobes- watch for n/v which can be a side effect of this drug  - po vanco for continuation of treatment for severe

## 2021-03-28 NOTE — PLAN OF CARE
Alert and oriented  Pain medications given per request, patient reports minimal relief. Tolerating IV antibiotics   IR drains flushed per order  VSS, will continue to monitor.     Problem: CARDIOVASCULAR - ADULT  Goal: Maintains optimal cardiac output and ordered  - Instruct patient on fluid and nutrition restrictions as appropriate  Outcome: Progressing  Goal: Hemodynamic stability and optimal renal function maintained  Description: INTERVENTIONS:  - Monitor labs and assess for signs and symptoms of volume

## 2021-03-29 PROCEDURE — 99232 SBSQ HOSP IP/OBS MODERATE 35: CPT | Performed by: INTERNAL MEDICINE

## 2021-03-29 RX ORDER — OXYCODONE HYDROCHLORIDE 10 MG/1
10 TABLET ORAL EVERY 4 HOURS PRN
Status: DISCONTINUED | OUTPATIENT
Start: 2021-03-29 | End: 2021-04-01

## 2021-03-29 RX ORDER — MAGNESIUM OXIDE 400 MG (241.3 MG MAGNESIUM) TABLET
800 TABLET ONCE
Status: COMPLETED | OUTPATIENT
Start: 2021-03-29 | End: 2021-03-29

## 2021-03-29 RX ORDER — OXYCODONE HYDROCHLORIDE 10 MG/1
10 TABLET ORAL EVERY 4 HOURS
Status: DISCONTINUED | OUTPATIENT
Start: 2021-03-29 | End: 2021-04-01

## 2021-03-29 NOTE — IMAGING NOTE
Abd fluid collection drainage x 2. Outputs 80 and 50 ml last 24 hrs. CT abscessogram when outputs are less than 10ml for 2 days. Can be as an outpt if he is discharged.   CPM.

## 2021-03-29 NOTE — PHYSICAL THERAPY NOTE
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 ..  -   Turning over in bed (including adjusting bedclothes, sheets and blankets)?: None   -   Sitting down on and standing up from a chair with arms (e.g., wheelchair, bedside commode, etc.): A Little   -   Moving from lyin and concerns addressed; Alarm set    ASSESSMENT   Pt continues to present with impaired strength, balance and decreased endurance below PLOF. Pt requires min A during gait training sans AD.  PTA pt was independent with all functional mobility including ambul

## 2021-03-29 NOTE — PROGRESS NOTES
[facility]  Progress Note    Alanisxiang Rod Patient Status:  Inpatient    1965 MRN [id number]   [facility] 3NW-A Attending Niki Carpio MD   Hosp Day # 39 PCP Zion Lang MD     Subjective:   The patient states that he hemorrhoids     Acute gastritis     Bursitis of left shoulder     Biceps tendonitis, left     Rotator cuff tendonitis, left     Eye muscle twitches     Acute nonintractable headache     Myalgia     Arthralgia     Wheezing     Cough     Chest pain of uncert drainage. Ostomy healthy with soft output in bag.    -continue drains  -diet with supplements  -awaiting placement for rehab  -Patient's wife Jasvir Rodrickist present during visit and requesting a conference meeting with all physicians.  I explained the unlikely logis

## 2021-03-29 NOTE — PROGRESS NOTES
[facility]  Progress Note     Tami Menezes Patient Status:  Observation    1965 MRN [id number]   [facility] 8NE-A Attending Dr. Blanche Forman Day # 39 PCP Duke Bill MD     Chief Complaint: Chest pain, SOB    S: Pt --    ALB 1.9*  --  2.1*  --  1.7*  --   --   --   --    *  --  134*  --  135*  --  134*  --   --    K 4.3  --  4.3  --  3.4*  --  4.0  4.0  --   --      --  101  --  104  --  103  --   --    CO2 25.0  --  26.0  --  22.0  --  24.0  --   --    A abscessogram  4. Bilateral leg pain  1. Check dopplers, negative for dopplers  2. Pt on HSQ  5. Sarcoidosis  1. treated for >3 weeks for sarcoid flare with immunosuppresants and high dose steroids, trial of colchicine w/o improvement.    2. S/p bronch, BAL

## 2021-03-29 NOTE — CM/SW NOTE
MSW spoke with the patient at bedside and informed him that PMR is now recommending PARMJIT. The patient was understanding but stated that he feels his wife will be upset. He will discuss with her tonight.   He believes [facility] will be the choice but

## 2021-03-29 NOTE — PAYOR COMM NOTE
--------------  CONTINUED STAY REVIEW    Payor: JAD KLINE  Subscriber #:  [id number]  Authorization Number: [id number]    Admit date: 2/12/21  Admit time:  2:21 PM    FAXING CLINICAL UPDATE FOR 3/27/21- 3/29/21    3/27/21   Chief Complaint: Chest pain, SO with drain  4. TPN per surgery, tolerating more diet  2. Pancreatic mass/Necrotizing pancreatitis  1. GI/surgery following  2. elevated CA 19-9   3. Now with pancreatic abscess s/p drain  3. Pancreatic abscess  1. S/p  drainage today  2.  Follow cultures  3 266.0 252.0 212.0   BAND  --  1 32 24 18   INR  --   --  1.00  --   --       Lab 03/23/21  0700 03/24/21  1241 03/25/21  0525 03/25/21  0525 03/26/21  0941 03/27/21  0656 03/28/21  0656   *  --  95  --  116* 91  --    BUN 17  --  18  --  8 5*  -- Hypertension, currently low and BP meds held  7. Hyperlipidemia  1. Statin and fenofibrate  8. GERD  9. JEANETTE on CPAP  10. Anemia, partly dilutional, stable  11. Urinary retention, resolved  12. Possible orthostasis, resolved  13. Deconditioning   1.  PT/OT, decompression 3/6 with GI, worsening abdominal pain and CTAP with perforation. Underwent urgent total abdominal colectomy with end ileostomy on 3/7  2. Oxycodone po/dilaudid IV  3. Surgery plans to dc with drain  4. Tolerating diet  5.  Plan for long oral v Sarcoidosis on immunosuppression  5.  Pancreatic mass vs pancreatitis with focal necrosis- will need to repeat imaging down the road.     PLAN:  - continue tygacil to cover both organisms in cx, will also cover anaerobes- watch for n/v which can be a side e 0.5 mg Intravenous     3/28/2021 2218 Given 0.5 mg Intravenous     3/28/2021 1447 Given 0.5 mg Intravenous       magnesium oxide (MAG-OX) tab 800 mg     Date Action Dose Route     3/29/2021 0857 Given 800 mg Oral       OxyCODONE HCl IR (ROXICODONE) immedia

## 2021-03-29 NOTE — PLAN OF CARE
A&O x4. VSS and afebrile. Lungs clear and diminished bilaterally. NSR/ST with activity on tele. Abdomen soft, nondistended, tender. Bowel sounds active. Ostomy moist, pink, putting out liquid brown stool and gas this am. Pt reports better appetite today.  H type and severity of pain and evaluate response  - Implement non-pharmacological measures as appropriate and evaluate response  - Consider cultural and social influences on pain and pain management  - Manage/alleviate anxiety  - Utilize distraction and/or cognitive ability or social support system  Outcome: Progressing     Problem: RESPIRATORY - ADULT  Goal: Achieves optimal ventilation and oxygenation  Description: INTERVENTIONS:  - Assess for changes in respiratory status  - Assess for changes in [facility] [place] Maintains or returns to baseline bowel function  Description: INTERVENTIONS:  - Assess bowel function  - Maintain adequate hydration with IV or PO as ordered and tolerated  - Evaluate effectiveness of GI medications  - Encourage mobilization and activity Administer growth factors as ordered  - Implement neutropenic guidelines  Outcome: Progressing

## 2021-03-29 NOTE — PROGRESS NOTES
[id number] [facility] Patient Status:  In[facility]    1965 MRN [id number]   [facility] 3NW-A Attending Cyn Falk MD   [facility] Day # 39 PCP Jos Ha MD     SUBJECTIVE: Pt states pain remains \"high. \"  Dyspnea i injection 5,000 Units, 5,000 Units, Subcutaneous, Q8H [facility]  •  PEG 3350 (MIRALAX) powder packet 17 g, 17 g, Oral, Daily PRN  •  magnesium hydroxide (MILK OF MAGNESIA) 400 MG/5ML suspension 30 mL, 30 mL, Oral, Daily PRN  •  bisacodyl (DULCOLAX) rectal suppos CREATSERUM 0.21 03/29/2021     Lab Results   Component Value Date    INR 1.00 03/25/2021    INR 1.04 03/14/2021    INR 1.16 (H) 03/07/2021          Imaging: I have independently visualized all relevant chest imaging in PACS.   I agree with the radiology

## 2021-03-29 NOTE — DIETARY NOTE
[facility]    NUTRITION ASSESSMENT    Pt does not meet malnutrition criteria. NUTRITION INTERVENTION:    1. Meal and Snacks - monitor patient po intake. Encourage adequate po of appropriate diet once diet advances.   2. Medical Food Supplements - En diet. Pt reports tolerating diet well with slightly improved appetite. Pt receiving Ensure Enlive at breakfast and lunch. Encouraged pt to increase PO intake as tolerated.  Pt receptive and had no further questions at this time.     3/3- Pt diet advanced to (210 lb)  04/06/20 : 95.3 kg (210 lb)       NUTRITION:  Diet: Orders Placed This Encounter      Regular/General diet Is Patient on Accuchecks?  No     Oral Supplements: Ensure Clear BID +Ensure HP TID    Percent Meals Eaten (last 3 days)     Date/Time Perce

## 2021-03-29 NOTE — PLAN OF CARE
PT VSS, Aox4. Pt arm precautions in in place; BP is being taken on left leg only. PT on regular diet; reports appetite is improving; bowel sounds present x4, liquid brown/green stool noted in ileostomy bag.  Nausea reported by pt x1 this evening; managed wi level or patient's stated pain goal  Description: INTERVENTIONS:  - Encourage pt to monitor pain and request assistance  - Assess pain using appropriate pain scale  - Administer analgesics based on type and severity of pain and evaluate response  - Impleme health  - Refer to Case Management Department for coordinating discharge planning if the patient needs post-hospital services based on physician/LIP order or complex needs related to functional status, cognitive ability or social support system  Outcome: P measures as available)  - Encourage oral intake as appropriate  - Instruct patient on fluid and nutrition restrictions as appropriate  Outcome: Progressing     Problem: GASTROINTESTINAL - ADULT  Goal: Maintains or returns to baseline bowel function  Descri promote bladder emptying  Outcome: Progressing     Problem: RISK FOR INFECTION - ADULT  Goal: Absence of fever/infection during anticipated neutropenic period  Description: INTERVENTIONS  - Monitor WBC  - Administer growth factors as ordered  - Implement n

## 2021-03-30 PROCEDURE — 99232 SBSQ HOSP IP/OBS MODERATE 35: CPT | Performed by: INTERNAL MEDICINE

## 2021-03-30 RX ORDER — LEVOFLOXACIN 500 MG/1
500 TABLET, FILM COATED ORAL DAILY
Status: DISCONTINUED | OUTPATIENT
Start: 2021-03-30 | End: 2021-04-01

## 2021-03-30 RX ORDER — MAGNESIUM OXIDE 400 MG (241.3 MG MAGNESIUM) TABLET
800 TABLET ONCE
Status: COMPLETED | OUTPATIENT
Start: 2021-03-30 | End: 2021-03-30

## 2021-03-30 NOTE — PROGRESS NOTES
INFECTIOUS DISEASE PROGRESS NOTE    Santa Roper Patient Status:  Inpatient    1965 MRN [id number]   [facility] 3NW-A Attending Cyn Falk MD   [facility] Day # 55 PCP Keren Toro g, Oral, Daily PRN  •  magnesium hydroxide (MILK OF MAGNESIA) 400 MG/5ML suspension 30 mL, 30 mL, Oral, Daily PRN  •  bisacodyl (DULCOLAX) rectal suppository 10 mg, 10 mg, Rectal, Daily PRN  •  Fleet Enema (FLEET) 7-19 GM/118ML enema 133 mL, 1 enema, Recta with cloudy drainage. Musculoskeletal: Full range of motion of all extremities. No arthritis or deformity  Integument: No rash. No open wounds.     Laboratory Data:    Recent Labs   Lab 03/25/21  0525 03/26/21  0941 03/27/21  0656   RBC 3.60* 3.13* 2.96* Microbiology    Reviewed in EMR,   Hospital Encounter on 02/10/21   1. ANAEROBIC CULTURE     Status: None    Collection Time: 03/25/21  5:20 PM    Specimen: Abdomen;  Other   Result Value Ref Range    Anaerobic Culture No Anaerobes isolated N/A   2. AER evaluate the lower extremity venous system. B-mode two-dimensional images of the vascular structures, Doppler spectral analysis, and color flow. Doppler imaging were performed.  The   following veins were imaged bilaterally: Common, deep, and superficial fe once output from drains less than 10 ccs for 2 days. May be able to go to rehab on IV abx with tygacil (has midline) and come back for fistulogram pending progress and output from drains. D/w RN and patient  Will follow    Dianna Schmidt.  Neto Farias PA-C

## 2021-03-30 NOTE — CM/SW NOTE
AVIVA follow up on dc plans. Per Cristhian Fire with [facility], pt no longer meets guidelines for acute rehab. Met with pt and his daughter at bedside. Provided updated PARMJIT list. Pt considering [facility] [place].  Daughter looking at [facility] and d

## 2021-03-30 NOTE — OCCUPATIONAL THERAPY NOTE
OCCUPATIONAL THERAPY TREATMENT NOTE - INPATIENT     Room Number: 325/325-A  Session:  7/7, additional 5 sessions added 3/30  Number of Visits to Meet Established Goals: 5 (additional 5 sessions added 3/30)    Presenting Problem: chest pain, bowel perforati by Searcy Spurling, MD at [facility] ENDOSCOPY   • COLONOSCOPY     • COLONOSCOPY  11/2015    4 small adeonomas, diverticulosis. repeat 2018   • EGD  11/2015     schatzki's ring, non obstructive. Erosive esophagitis, LA grad 1 (no barretts on path).  Gastritis, ASSESSMENT  Supine to Sit : Supervision  Sit to Stand: Not tested    Skilled Therapy Provided:   Pt rec'd supine in bed with supportive partner present.  Pt reports he just completed walking in hallways with nursing staff and declines any ADLs at this time, eval/education; Compensatory technique education  Rehab Potential : Good  Frequency (Obs): 3-5x/week      OT Goals: Goals ongoing 3/30 unless otherwise specified  ADL GOALS:  Patient will perform lower body dressing w/ supervision and with adaptive equipmen

## 2021-03-30 NOTE — PLAN OF CARE
A&O x4. VSS and afebrile. Lungs clear and diminished bilaterally. NSR/ST with activity on tele. Abdomen soft, tender, nondistended. Bowel sounds present. Ostomy moist, pink putting out liquid stool and gas.  Midline incision approximated and healing, c/d/I. appropriate  Outcome: Progressing     Problem: SAFETY ADULT - FALL  Goal: Free from fall injury  Description: INTERVENTIONS:  - Assess pt frequently for physical needs  - Identify cognitive and physical deficits and behaviors that affect risk of falls.   - applicable  - Encourage broncho-pulmonary hygiene including cough, deep breathe, Incentive Spirometry  - Assess the need for suctioning and perform as needed  - Assess and instruct to report SOB or any respiratory difficulty  - Respiratory Therapy support Maintains adequate nutritional intake (undernourished)  Description: INTERVENTIONS:  - Monitor percentage of each meal consumed  - Identify factors contributing to decreased intake, treat as appropriate  - Assist with meals as needed  - Monitor I&O, WT and

## 2021-03-30 NOTE — PROGRESS NOTES
[facility]  Progress Note     Tami Menezes Patient Status:  Observation    1965 MRN [id number]   [facility] 8NE-A Attending Dr. Salome Carpenter Day # 55 PCP Duke Bill MD     Chief Complaint: Chest pain, SOB    S: P --   --   --    ALB 2.1*  --  1.7*  --   --   --   --   --   --    *  --  135*  --  134*  --   --   --   --    K 4.3  --  3.4*  --  4.0  4.0  --   --   --   --      --  104  --  103  --   --   --   --    CO2 26.0  --  22.0  --  24.0  --   -- ID  4. Future CT abscessogram once <10cc for 2 days  4. Bilateral leg pain  1. Doppler neg  5. Sarcoidosis  1. treated for >3 weeks for sarcoid flare with immunosuppresants and high dose steroids, trial of colchicine w/o improvement.    2. S/p bronch, BAL a 0954

## 2021-03-30 NOTE — PAYOR COMM NOTE
--------------  CONTINUED STAY REVIEW    Payor: JAD KLINE  Subscriber #:  [id number]  Authorization Number: [id number]    Admit date: 2/12/21  Admit time:  2:21 PM    FAXING CLINICAL UPDATE FOR 3/30/21    3/30/21  Chief Complaint: Chest pain, SOB     S: P --    TP 5.6*  --  5.0*  --   --   --   --   --   --       ASSESSMENT / PLAN:      1. C difficile toxic megacolon with perforated bowel   1. Had urgent decompression 3/6 with GI, worsening abdominal pain and CTAP with perforation.  Underwent urgent total ab eval as pt now s/p ileostomy- diarrhea not unexpected. 3. Leukocytosis- due to above. Improving. 4. Sarcoidosis on immunosuppression. Cellcept on hold. Currently on decadron 2mg daily.   5. Pancreatic mass vs pancreatitis with focal necrosis- will need to Date Action Dose Route     3/30/2021 0627 Given 40 mg Oral       0.9% NaCl infusion     Date Action Dose Route     3/30/2021 0309 New Bag (none) Intravenous       tamsulosin HCl (FLOMAX) cap 0.4 mg     Date Action Dose Route     3/29/2021 2120 Given 0.

## 2021-03-30 NOTE — CM/SW NOTE
SW met with pt and his wife earlier. Wife initially wanted [facility] and planned to tour the facility. SW confirmed with Jenna Nicholson that they are still not doing tours or allowing visitors at this time. Updated wife who was disappointed to hear this news.

## 2021-03-30 NOTE — PROGRESS NOTES
[id number] [facility] Patient Status:  Inpatient    1965 MRN [id number]   [facility] 3NW-A Attending Garett Iglesias MD   [address] Day # 55 PCP Megan Salazar MD     SUBJECTIVE: Pt states that he feels slightly better tod 5,000 Units, 5,000 Units, Subcutaneous, Q8H [address]  •  PEG 3350 (MIRALAX) powder packet 17 g, 17 g, Oral, Daily PRN  •  magnesium hydroxide (MILK OF MAGNESIA) 400 MG/5ML suspension 30 mL, 30 mL, Oral, Daily PRN  •  bisacodyl (DULCOLAX) rectal suppository 10 m CREATSERUM 0.40 03/30/2021     Lab Results   Component Value Date    INR 1.00 03/25/2021    INR 1.04 03/14/2021    INR 1.16 (H) 03/07/2021          Imaging: I have independently visualized all relevant chest imaging in PACS.   I agree with the radiology int

## 2021-03-30 NOTE — PROGRESS NOTES
Drains (midline & left) flushed and aspirated per MD orders. Drainage tan/brown, thick, odorous. Incision sites and dressings clean, dry, intact.

## 2021-03-30 NOTE — PROGRESS NOTES
[facility]  Progress Note    Andrea Olmedo Patient Status:  Inpatient    1965 MRN [id number]   [facility] 3NW-A Attending Sergio Preciado MD   [facility] Day # 55 PCP Charmayne Devoid, MD     Subjective:   The patient states that he left     Eye muscle twitches     Acute nonintractable headache     Myalgia     Arthralgia     Wheezing     Cough     Chest pain of uncertain etiology     Body aches     Gastroesophageal reflux disease     Throat pain     NAFLD (nonalcoholic fatty liver dis

## 2021-03-30 NOTE — IMAGING NOTE
53 yo M w/ post op fluid collections s/p drain placement x 2.  350 ml output charted yest from more superior drain. 25 ml charted from LLQ. Tube to remain in place given output.   If < 15 ml/d consistently could consider removal.  Would consider tube chec

## 2021-03-31 PROCEDURE — 99233 SBSQ HOSP IP/OBS HIGH 50: CPT | Performed by: STUDENT IN AN ORGANIZED HEALTH CARE EDUCATION/TRAINING PROGRAM

## 2021-03-31 NOTE — IMAGING NOTE
55 yo M post op fluid collections s/p drain placement x 2 by IR. Downtrending outputs of 90 mL and 30 mL from upper and lower tubes. Given these outputs would continue both tubes, consider tube check/possible removal if consistently <10-15 mL per day.   Noa Villagomez

## 2021-03-31 NOTE — PLAN OF CARE
Pt A&Ox4, HR in 110s. Telemetry called w/ elevated HR in 150s, orthostatics done on pt were negative. IVF. EDYTA 2 had 5ml output. IR drains 3 and 4 were flushed with 10ml saline and aspirated back 10cc tan output for 3 and 10cc tan for 4.  Ileostomy put out 7 ELECTROLYTES - ADULT  Goal: Electrolytes maintained within normal limits  Description: INTERVENTIONS:  - Monitor labs and rhythm and assess patient for signs and symptoms of electrolyte imbalances  - Administer electrolyte replacement as ordered  - Monitor management  - Manage/alleviate anxiety  - Utilize distraction and/or relaxation techniques  - Monitor for opioid side effects  - Notify MD/LIP if interventions unsuccessful or patient reports new pain  - Anticipate increased pain with activity and pre-medi INTERVENTIONS  - Monitor WBC  - Administer growth factors as ordered  - Implement neutropenic guidelines  Outcome: Progressing     Problem: GASTROINTESTINAL - ADULT  Goal: Maintains or returns to baseline bowel function  Description: INTERVENTIONS:  - Asse

## 2021-03-31 NOTE — PROGRESS NOTES
Pt is Ax4, vss, /Tele, JEANETTE, BUE precautions, daily wt, tolerating general diet w/ an improved appetite, voids, up w/ sb, ileostomy producing soft/brown stool. Midline incision approximated, healing & BLAYNE.  EDYTA x1, IR drains x 2 to flush & aspirate Qshift,

## 2021-03-31 NOTE — PROGRESS NOTES
[facility]  Progress Note    Isaac Stuart Patient Status:  Inpatient    1965 MRN [id number]   [facility] 3NW-A Attending Celine Allen MD   [facility] Day # 52 PCP Alton Lora MD     Subjective:   The patient is sitting up in t Rotator cuff tendonitis, left     Eye muscle twitches     Acute nonintractable headache     Myalgia     Arthralgia     Wheezing     Cough     Chest pain of uncertain etiology     Body aches     Gastroesophageal reflux disease     Throat pain     NAFLD (non

## 2021-03-31 NOTE — PROGRESS NOTES
INFECTIOUS DISEASE PROGRESS NOTE    Erla Ear Patient Status:  Inpatient    1965 MRN [id number]   [facility] 3NW-A Attending Cyntha Leventhal, MD   [facility] Day # 52 PCP Delaney Coello Q8H [address]  •  PEG 3350 (MIRALAX) powder packet 17 g, 17 g, Oral, Daily PRN  •  magnesium hydroxide (MILK OF MAGNESIA) 400 MG/5ML suspension 30 mL, 30 mL, Oral, Daily PRN  •  bisacodyl (DULCOLAX) rectal suppository 10 mg, 10 mg, Rectal, Daily PRN  •  Fleet En with purulent appearing drianage on R. 2 drains on L with minimal cloudy drainage. Musculoskeletal: Full range of motion of all extremities. No arthritis or deformity  Integument: No rash. No open wounds.     Laboratory Data:    Recent Labs   Lab 03/25/21 UROBILINOGEN <2.0   NITRITE Negative   LEUUR Negative        Microbiology    Reviewed in EMR,   Hospital Encounter on 02/10/21   1. ANAEROBIC CULTURE     Status: None    Collection Time: 03/25/21  5:20 PM    Specimen: Abdomen;  Other   Result Value Ref Ra and duplex ultrasound was used to evaluate the lower extremity venous system. B-mode two-dimensional images of the vascular structures, Doppler spectral analysis, and color flow. Doppler imaging were performed.  The   following veins were imaged bilaterally transition to po abx on dc x 2 weeks at this point.  - noted plans for PARMJIT on dc. Ok for [facility] from [place] standpoint    D/w RN, Dr. Rikki Monroe and patient  Will follow    Lucia Tan.  Larissa Gutiérrez PA-C

## 2021-03-31 NOTE — PROGRESS NOTES
Pulmonary Progress Note      NAME: Alcides Route - ROOM: 174/867-T - MRN: [id number] - Age: 54year old - : 1965    Assessment/Plan:    1.  Atypical chest pain - ongoing for several weeks  - Had been felt to be due to either sarcoid, pleurisy, or • Heparin Sodium (Porcine)  5,000 Units Subcutaneous Q8H [facility]   • Sulfamethoxazole-TMP DS  1 tablet Oral Three Times Weekly   • [MAR Hold] fenofibrate micronized  201 mg Oral QAM AC   • atorvastatin  20 mg Oral Nightly       Intake/Output Summary (Last 24 --   --   --   --   --    ALT 18  --  28  --   --   --   --   --   --    BILT 0.5  --  0.6  --   --   --   --   --   --    TP 5.6*  --  5.0*  --   --   --   --   --   --     < > = values in this interval not displayed.        Imaging: I independently visual

## 2021-03-31 NOTE — PROGRESS NOTES
[facility]                INFECTIOUS DISEASE PROGRESS NOTE    Angel Galloway Patient Status:  Inpatient    1965 MRN [id number]   [facility] 3NW-A Attending Gabe Maloney MD   [facility] Day # 52 PCP Anabell Medina MD     Antib 0.24*   < > 0.21* 0.40* 0.33*   GFRAA 153   < > 182   < > 191   < > 201 155 167   GFRNAA 132   < > 157   < > 165   < > 174 134 145   CA 9.0  --  8.1*  --  8.3*  --   --   --   --    ALB 2.1*  --  1.7*  --   --   --   --   --   --    *  --  135*  -- 03/25/21  1:39 PM    Specimen: Bld, Triple Lumen Line; Blood   Result Value Ref Range    Blood Culture Result No Growth 5 Days N/A   4. TISSUE AEROBIC CULTURE     Status: None    Collection Time: 02/16/21  8:20 AM    Specimen: TBNA Lymph node 11L; Fine nee

## 2021-03-31 NOTE — PROGRESS NOTES
[facility]  Progress Note     Kimmy Pack Patient Status:  Inpatient    1965 MRN [id number]   [facility] 3NW-A Attending Nisa Brown MD   [facility] Day # 52 PCP Daquan Hernandes MD     Chief Complaint: SOB    S: Patient  rep 1.7*  --   --   --   --   --   --    *  --  135*  --  134*  --   --   --   --    K 4.3  --  3.4*  --  4.0  4.0  --   --   --   --      --  104  --  103  --   --   --   --    CO2 26.0  --  22.0  --  24.0  --   --   --   --    ALKPHO 116  --  106 Sx  2. Vanco   2. Sinus tachycardia  3. Pancreatic mass/ abscess  1. With drains   2. Natalie Freeman in Cx- Tygacil per ID  4. Sarcoidosis   5. Hypertension  6. Anemia   7. deconditioning   8.  Atypical chest pain due to sarcoid/ bowel perforation/panc

## 2021-03-31 NOTE — PHYSICAL THERAPY NOTE
PHYSICAL THERAPY TREATMENT NOTE - INPATIENT    Room Number: 325/325-A     Session: 15  Number of Visits to Meet Established Goals: 10    Presenting Problem: s/p total abdoinal colectomy/ileostomy 3/7     History related to current admission:   Hx trevoro Past Surgical History  Past Surgical History:   Procedure Laterality Date   • BIOPSY/REMOVAL, LYMPH NODE(S) Bilateral 2013    Lymph node removal (neck)   • BRONCHOSCOPY N/A 2/16/2021    Performed by Angeline Gann MD at [facility] ENDOSCOPY   • Hina Carranza MOBILITY  How much difficulty does the patient currently have. ..  -   Turning over in bed (including adjusting bedclothes, sheets and blankets)?: A Little   -   Sitting down on and standing up from a chair with arms (e.g., wheelchair, bedside commode, etc. limited by BLE weakness and decreased endurance and is unable to safely attempt stairs this session. The AM-PAC '6-Clicks' Inpatient Basic Mobility Short Form was completed and this patient is demonstrating a 54% degree of impairment in mobility.  Resear

## 2021-03-31 NOTE — CM/SW NOTE
Received call from Pt's wife advising that she has further discuss PARMJIT options with pt and they have chosen [facility] in [place]. SW notified [facility] and they will pursue insurance auth for PARMJIT. SW will follow.         Martín, [address]

## 2021-04-01 VITALS
WEIGHT: 163.38 LBS | HEART RATE: 105 BPM | DIASTOLIC BLOOD PRESSURE: 70 MMHG | SYSTOLIC BLOOD PRESSURE: 135 MMHG | OXYGEN SATURATION: 98 % | RESPIRATION RATE: 18 BRPM | BODY MASS INDEX: 24.76 KG/M2 | HEIGHT: 68 IN | TEMPERATURE: 98 F

## 2021-04-01 RX ORDER — GABAPENTIN 100 MG/1
100 CAPSULE ORAL 3 TIMES DAILY
Qty: 90 CAPSULE | Refills: 0 | Status: SHIPPED | OUTPATIENT
Start: 2021-04-01

## 2021-04-01 RX ORDER — TAMSULOSIN HYDROCHLORIDE 0.4 MG/1
0.4 CAPSULE ORAL NIGHTLY
Qty: 30 CAPSULE | Refills: 0 | Status: SHIPPED | OUTPATIENT
Start: 2021-04-01 | End: 2021-04-01

## 2021-04-01 RX ORDER — DEXAMETHASONE 2 MG/1
2 TABLET ORAL
Qty: 30 TABLET | Refills: 0 | Status: SHIPPED | OUTPATIENT
Start: 2021-04-02

## 2021-04-01 RX ORDER — SULFAMETHOXAZOLE AND TRIMETHOPRIM 800; 160 MG/1; MG/1
1 TABLET ORAL
Qty: 12 TABLET | Refills: 0 | Status: SHIPPED | OUTPATIENT
Start: 2021-04-02 | End: 2021-04-30

## 2021-04-01 RX ORDER — OXYCODONE HYDROCHLORIDE 10 MG/1
10 TABLET ORAL EVERY 6 HOURS PRN
Qty: 12 TABLET | Refills: 0 | Status: ON HOLD | OUTPATIENT
Start: 2021-04-01 | End: 2021-04-13

## 2021-04-01 RX ORDER — TAMSULOSIN HYDROCHLORIDE 0.4 MG/1
0.4 CAPSULE ORAL NIGHTLY
Qty: 30 CAPSULE | Refills: 0 | Status: SHIPPED | OUTPATIENT
Start: 2021-04-01 | End: 2021-04-30

## 2021-04-01 RX ORDER — LEVOFLOXACIN 500 MG/1
500 TABLET, FILM COATED ORAL DAILY
Qty: 14 TABLET | Refills: 0 | Status: SHIPPED | OUTPATIENT
Start: 2021-04-02 | End: 2021-04-16

## 2021-04-01 RX ORDER — METRONIDAZOLE 500 MG/1
500 TABLET ORAL 3 TIMES DAILY
Qty: 42 TABLET | Refills: 0 | Status: SHIPPED | OUTPATIENT
Start: 2021-04-01 | End: 2021-04-15

## 2021-04-01 RX ORDER — GABAPENTIN 100 MG/1
100 CAPSULE ORAL 3 TIMES DAILY
Qty: 90 CAPSULE | Refills: 0 | Status: SHIPPED | OUTPATIENT
Start: 2021-04-01 | End: 2021-04-01

## 2021-04-01 NOTE — PAYOR COMM NOTE
--------------  CONTINUED STAY REVIEW    Payor: JAD KLINE  Subscriber #:  [id number]  Authorization Number: [id number]    Admit date: 2/12/21  Admit time:  2:21 PM    FAXING CLINICAL UPDATE FOR 4/1/21 4/1/21   Abx: Tygacil--> Unasyn/Levaquin D#4, po va 145 120   CA 8.1*  --  8.3*  --   --   --   --    ALB 1.7*  --   --   --   --   --   --    *  --  134*  --   --   --   --    K 3.4*  --  4.0  4.0  --   --   --   --      --  103  --   --   --   --    CO2 22.0  --  24.0  --   --   --   --    ALK Given 2 mg Oral       gabapentin (NEURONTIN) cap 100 mg     Date Action Dose Route     4/1/2021 0953 Given 100 mg Oral     3/31/2021 2109 Given 100 mg Oral     3/31/2021 1700 Given 100 mg Oral       haloperidol lactate (HALDOL) 5 MG/ML injection 1 mg     D

## 2021-04-01 NOTE — PROGRESS NOTES
[facility]  Progress Note    Neptali Vargas Patient Status:  Inpatient    1965 MRN [id number]   [facility] 3NW-A Attending Sun Woods MD   Hosp Day # 50 PCP Heidy Thompson MD     Subjective:   The patient sitting up in [place] Myalgia     Arthralgia     Wheezing     Cough     Chest pain of uncertain etiology     Body aches     Gastroesophageal reflux disease     Throat pain     NAFLD (nonalcoholic fatty liver disease)     Hypokalemia     Leukocytosis     SALLY (acute kidney injury

## 2021-04-01 NOTE — PLAN OF CARE
Pt axo4 resting in bed, abdomen soft round tender to touch. Pt has 7/10 abdominal pain relieved with pain meds, pt did not appear to be in any distress.   Pt has two jay drains with serosanguinous fluid draining and two IR drains that are draining tan color Progressing     Problem: SAFETY ADULT - FALL  Goal: Free from fall injury  Description: INTERVENTIONS:  - Assess pt frequently for physical needs  - Identify cognitive and physical deficits and behaviors that affect risk of falls.   - [place] fall precaut broncho-pulmonary hygiene including cough, deep breathe, Incentive Spirometry  - Assess the need for suctioning and perform as needed  - Assess and instruct to report SOB or any respiratory difficulty  - Respiratory Therapy support as indicated  - Manage/a nutritional intake (undernourished)  Description: INTERVENTIONS:  - Monitor percentage of each meal consumed  - Identify factors contributing to decreased intake, treat as appropriate  - Assist with meals as needed  - Monitor I&O, WT and lab values  - Obta

## 2021-04-01 NOTE — DISCHARGE SUMMARY
[facility] HOSPITAL[facility]  DISCHARGE SUMMARY     Tyler Moore Patient Status:  Inpatient    1965 MRN [id number]   [facility] 3NW-A Attending Kamryn Figueredo MD   [facility] Day # 50 PCP Noble Chavarria MD     Date of Admission: 2/10/2021  Date with Dr. Kimi Ibarra at [facility]. Patient was started on CellCept and steroids IV per pulmonology. He developed worsening pain. Repeat CTA no PE.  CTA abdomen showing extensive inflammation of soft tissue encasing small bowel, 2.8 cm mass head of pancreas.   Melissa discharge. He was given prescription for oxycodone. He was discharged to subacute rehab as he did not qualify for acute rehab. Social work assist with discharge planning. He was discharged with cleared by all consultants.   He was instructed to follow-u small to be inducing or driving his presentation. Though, given his ongoing pain, we may need to revisit the timing of his EUS.       Consultants: Pulmonology, GI, surgical oncology, psychiatry, general surgery, wound care, infectious disease         Disch micronized 200 MG Caps  Commonly known as: LOFIBRA      Take 200 mg by mouth every morning before breakfast.   Refills: 0     fluticasone-salmeterol 250-50 MCG/DOSE Aepb  Commonly known as: Advair Diskus      Inhale 1 puff into the lungs 2 (two) times noelle as soon as possible for a visit in 1 week      Kenji Toscano MD  [address] [phone]      As directed    Appointments for Next 30 Days 4/2/2021 - 5/2/2021 Comment      Date Arrival Time Visit Type Length Dep

## 2021-04-01 NOTE — PROGRESS NOTES
[id number] Patient Status:  Inpatient    1965 MRN [id number]   [facility] 3NW-A Attending Celine Allen MD   Hosp Day # 50 PCP Alton Lora MD     Pulm / Critical Care Progress Note     S: pulm status stab Physical Exam:   General: alert, cooperative, No respiratory distress. Head: Normocephalic, without obvious abnormality, atraumatic. Lungs: ctab   Chest wall: No tenderness or deformity. Heart: Regular rate and rhythm, normal S1S2, no murmur.    Abd cultures with E.Coli & enterococcus faecium, on tygacil per ID   4. C Diff/toxic megacolon/bowel perforation - s/p colectomy/ileostomy 3/7  -per surgery  5. JEANETTE  - CPAP w/ sleep  6. Proph   - heparin tid  7.  Dispo   - full code, planning PARMJIT  - no objectio

## 2021-04-01 NOTE — CM/SW NOTE
AVIVA arranged [facility] ambulance for 7pm and confirmed with Romel Rosa at [place] they are able to accept pt today. Unit RN to call [facility] at [phone] for nurse report. PCS form completed for ambulance. Updates sent to [facility].          Anjali Boston, [address] numbness and tingling on fingers and feet.

## 2021-04-01 NOTE — DIETARY NOTE
[facility]    NUTRITION ASSESSMENT    Pt does not meet malnutrition criteria. NUTRITION INTERVENTION:    1. Meal and Snacks - monitor patient po intake. Encourage adequate po of appropriate diet once diet advances.   2. Medical Food Supplements - En ostomy with increased blood. Will continue to monitor for diet advancement and tolerance. If pt not able to safely consume adequate nutrition in 1-2 days, recommending alternative nutrition support. 3/4- Diet advanced to low fiber/soft diet.  Pt reports oz)  12/14/20 : 90.7 kg (200 lb)  11/30/20 : 89.5 kg (197 lb 6 oz)  09/28/20 : 87.1 kg (192 lb)  08/31/20 : 88.5 kg (195 lb)  08/21/20 : 89.8 kg (197 lb 15.6 oz)  06/15/20 : 89.8 kg (198 lb)  06/01/20 : 95.3 kg (210 lb)  04/06/20 : 95.3 kg (210 lb)       N

## 2021-04-01 NOTE — PROGRESS NOTES
INFECTIOUS DISEASE PROGRESS NOTE    Tiffanysanjeev Shara Patient Status:  Inpatient    1965 MRN [id number]   [facility] 3NW-A Attending Sergio Preciado MD   [facility] Day # 50 PCP Manju Snell powder packet 17 g, 17 g, Oral, Daily PRN  •  magnesium hydroxide (MILK OF MAGNESIA) 400 MG/5ML suspension 30 mL, 30 mL, Oral, Daily PRN  •  bisacodyl (DULCOLAX) rectal suppository 10 mg, 10 mg, Rectal, Daily PRN  •  Fleet Enema (FLEET) 7-19 GM/118ML enema on R. 2 drains on L with purulent appearing drainage. Musculoskeletal: Full range of motion of all extremities. No arthritis or deformity  Integument: No rash. No open wounds.     Laboratory Data:    Recent Labs   Lab 03/26/21  8810 03/27/21  0656 04/01/2 Specimen: Abdomen; Other   Result Value Ref Range    Anaerobic Culture No Anaerobes isolated N/A   2. AEROBIC BACTERIAL CULTURE     Status: Abnormal    Collection Time: 03/25/21  5:20 PM    Specimen: Abdomen;  Other   Result Value Ref Range    Aerobic Cultu performed. The   following veins were imaged bilaterally: Common, deep, and superficial femoral, popliteal, sapheno-femoral junction, and posterior tibial veins.      PATIENT STATED HISTORY: (As transcribed by Technologist) Patient offered no additional his

## 2021-04-01 NOTE — PROGRESS NOTES
[facility] HOSPITAL[facility]  Progress Note     Santaadolfo Roper Patient Status:  Inpatient    1965 MRN [id number]   [facility] 3NW-A Attending Alfonso Bullock MD   Hosp Day # 50 PCP Jos Ha MD     Chief Complaint: SOB    S: Patient repo 3.4*  --  4.0  4.0  --   --   --   --      --  103  --   --   --   --    CO2 22.0  --  24.0  --   --   --   --    ALKPHO 106  --   --   --   --   --   --    AST 13*  --   --   --   --   --   --    ALT 28  --   --   --   --   --   --    BILT 0.6  -- output  2. Ecoli, Enteroccocus in Cx- abx per ID   4. Sarcoidosis   1. Steroids/bactrim ppx, pulm spoke with Mares  5. Hypertension,s table  1. BP meds stopped last week due to orthostasis symptoms   6. Anemia, stable  7.  Leukocytosis due to abscesses/stero

## 2021-04-01 NOTE — PROGRESS NOTES
[facility]  Progress Note      Niraj Bolanos Patient Status:  Inpatient    1965 MRN [id number]   [facility] 3NW-A Attending Lon Valdivia MD   [facility] Day # 50 PCP Joyce Johnson MD     54year-old male with abdominal fluid col

## 2021-04-01 NOTE — CM/SW NOTE
Case discussed in rounds. Madelaine in [address] has received insurance auth for PARMJIT. Anticipate dc today,  will coordinate.         Martín, [address]

## 2021-04-02 ENCOUNTER — EXTERNAL FACILITY (OUTPATIENT)
Dept: FAMILY MEDICINE CLINIC | Facility: CLINIC | Age: 56
End: 2021-04-02

## 2021-04-02 DIAGNOSIS — E87.6 HYPOKALEMIA: ICD-10-CM

## 2021-04-02 DIAGNOSIS — K29.00 ACUTE GASTRITIS WITHOUT HEMORRHAGE, UNSPECIFIED GASTRITIS TYPE: ICD-10-CM

## 2021-04-02 DIAGNOSIS — K59.03 DRUG-INDUCED CONSTIPATION: ICD-10-CM

## 2021-04-02 DIAGNOSIS — F43.23 ADJUSTMENT DISORDER WITH MIXED ANXIETY AND DEPRESSED MOOD: ICD-10-CM

## 2021-04-02 DIAGNOSIS — K21.9 GASTROESOPHAGEAL REFLUX DISEASE WITHOUT ESOPHAGITIS: ICD-10-CM

## 2021-04-02 DIAGNOSIS — R53.1 GENERALIZED WEAKNESS: ICD-10-CM

## 2021-04-02 DIAGNOSIS — E87.1 HYPONATREMIA: ICD-10-CM

## 2021-04-02 DIAGNOSIS — A04.72 TOXIC MEGACOLON DUE TO CLOSTRIDIUM DIFFICILE: ICD-10-CM

## 2021-04-02 DIAGNOSIS — R53.81 PHYSICAL DECONDITIONING: ICD-10-CM

## 2021-04-02 DIAGNOSIS — K76.0 NAFLD (NONALCOHOLIC FATTY LIVER DISEASE): ICD-10-CM

## 2021-04-02 DIAGNOSIS — A04.72 C. DIFFICILE COLITIS: ICD-10-CM

## 2021-04-02 DIAGNOSIS — K63.1 BOWEL PERFORATION (HCC): Primary | ICD-10-CM

## 2021-04-02 DIAGNOSIS — K86.89 PANCREATIC MASS: ICD-10-CM

## 2021-04-02 DIAGNOSIS — E78.00 PURE HYPERCHOLESTEROLEMIA: ICD-10-CM

## 2021-04-02 DIAGNOSIS — M25.50 ARTHRALGIA, UNSPECIFIED JOINT: ICD-10-CM

## 2021-04-02 DIAGNOSIS — K85.80 OTHER ACUTE PANCREATITIS, UNSPECIFIED COMPLICATION STATUS: ICD-10-CM

## 2021-04-02 PROCEDURE — 99306 1ST NF CARE HIGH MDM 50: CPT | Performed by: FAMILY MEDICINE

## 2021-04-02 NOTE — PLAN OF CARE
Patient assessed and ready for DC to [facility]   All instructions given to patient and report called to RN at [facility].    Paper copies of all RXs given to ems for rehab staff       NURSING DISCHARGE NOTE    Discharged Home via ambulance   Accompanied by Support replacement as ordered  - Monitor response to electrolyte replacements, including rhythm and repeat lab results as appropriate  - Fluid restriction as ordered  - Instruct patient on fluid and nutrition restrictions as appropriate  Outcome: Adequate for Dis pain  - Anticipate increased pain with activity and pre-medicate as appropriate  Outcome: Adequate for Discharge     Problem: SAFETY ADULT - FALL  Goal: Free from fall injury  Description: INTERVENTIONS:  - Assess pt frequently for physical needs  - Identi Term Goal  Description: Patient's Short Term Goal: get some sleep    Interventions:   - cluster care as able  - See additional Care Plan goals for specific interventions  Outcome: Adequate for Discharge     Problem: GASTROINTESTINAL - ADULT  Goal: Maintain

## 2021-04-04 PROBLEM — R07.0 THROAT PAIN: Status: RESOLVED | Noted: 2020-11-30 | Resolved: 2021-04-04

## 2021-04-04 PROBLEM — M79.89 LEG SWELLING: Status: RESOLVED | Noted: 2019-03-04 | Resolved: 2021-04-04

## 2021-04-04 PROBLEM — M25.551 PAIN OF RIGHT HIP JOINT: Status: RESOLVED | Noted: 2019-02-09 | Resolved: 2021-04-04

## 2021-04-04 PROBLEM — M79.10 MYALGIA: Status: RESOLVED | Noted: 2020-02-05 | Resolved: 2021-04-04

## 2021-04-04 PROBLEM — R06.00 DYSPNEA, UNSPECIFIED TYPE: Status: RESOLVED | Noted: 2018-04-12 | Resolved: 2021-04-04

## 2021-04-04 PROBLEM — R07.9 CHEST PAIN: Status: RESOLVED | Noted: 2018-04-12 | Resolved: 2021-04-04

## 2021-04-04 PROBLEM — R06.2 WHEEZING: Status: RESOLVED | Noted: 2020-02-05 | Resolved: 2021-04-04

## 2021-04-04 PROBLEM — R07.9 CHEST PAIN OF UNCERTAIN ETIOLOGY: Status: RESOLVED | Noted: 2020-08-20 | Resolved: 2021-04-04

## 2021-04-04 PROBLEM — R05.9 COUGH: Status: RESOLVED | Noted: 2020-05-15 | Resolved: 2021-04-04

## 2021-04-04 NOTE — PROGRESS NOTES
HPI/Subjective:   Isaac Stuart is a 54year old male who presents for Follow - Up (s/p bowel perforation with acute pancreatitis with mass. )   New Admission to [place] [address] from [facility], covering for Dr. Kateryna Zuluaga.       55 yo male presenting to [place] [address] (VITAMIN C-GLADYS HIPS) 500 MG Oral Tab Take 500 mg by mouth daily. • Omeprazole 40 MG Oral Capsule Delayed Release Take 40 mg by mouth daily.        • fluticasone-salmeterol (ADVAIR DISKUS) 250-50 MCG/DOSE Inhalation Aerosol Powder, Breath Activated [facility] Hematological: Negative for adenopathy. Does not bruise/bleed easily. Psychiatric/Behavioral: Negative for agitation, confusion, decreased concentration, dysphoric mood, hallucinations, self-injury, sleep disturbance and suicidal ideas.  The patient is joint  Other acute pancreatitis, unspecified complication status  Acute gastritis without hemorrhage, unspecified gastritis type  Adjustment disorder with mixed anxiety and depressed mood  C. difficile colitis  Drug-induced constipation  Gastroesophageal r

## 2021-04-05 ENCOUNTER — INITIAL APN SNF VISIT (OUTPATIENT)
Dept: INTERNAL MEDICINE CLINIC | Age: 56
End: 2021-04-05

## 2021-04-05 ENCOUNTER — HOSPITAL ENCOUNTER (INPATIENT)
Facility: HOSPITAL | Age: 56
LOS: 8 days | Discharge: NURSING, REHAB OR LONG-TERM CARE | DRG: 373 | End: 2021-04-14
Attending: EMERGENCY MEDICINE | Admitting: HOSPITALIST
Payer: COMMERCIAL

## 2021-04-05 DIAGNOSIS — Z93.2 S/P ILEOSTOMY (HCC): ICD-10-CM

## 2021-04-05 DIAGNOSIS — Z74.1 REQUIRES ASSISTANCE WITH ACTIVITIES OF DAILY LIVING (ADL): ICD-10-CM

## 2021-04-05 DIAGNOSIS — Z79.899 MEDICATION MANAGEMENT: ICD-10-CM

## 2021-04-05 DIAGNOSIS — A04.72 C. DIFFICILE COLITIS: Primary | ICD-10-CM

## 2021-04-05 DIAGNOSIS — R18.8 INTRAABDOMINAL FLUID COLLECTION: Primary | ICD-10-CM

## 2021-04-05 DIAGNOSIS — D86.9 SARCOIDOSIS: ICD-10-CM

## 2021-04-05 PROCEDURE — 99310 SBSQ NF CARE HIGH MDM 45: CPT | Performed by: NURSE PRACTITIONER

## 2021-04-05 PROCEDURE — 3078F DIAST BP <80 MM HG: CPT | Performed by: NURSE PRACTITIONER

## 2021-04-05 PROCEDURE — 3074F SYST BP LT 130 MM HG: CPT | Performed by: NURSE PRACTITIONER

## 2021-04-06 ENCOUNTER — APPOINTMENT (OUTPATIENT)
Dept: CT IMAGING | Facility: HOSPITAL | Age: 56
DRG: 373 | End: 2021-04-06
Attending: PHYSICIAN ASSISTANT
Payer: COMMERCIAL

## 2021-04-06 ENCOUNTER — APPOINTMENT (OUTPATIENT)
Dept: CT IMAGING | Facility: HOSPITAL | Age: 56
DRG: 373 | End: 2021-04-06
Attending: EMERGENCY MEDICINE
Payer: COMMERCIAL

## 2021-04-06 PROBLEM — R18.8 INTRAABDOMINAL FLUID COLLECTION: Status: ACTIVE | Noted: 2021-04-06

## 2021-04-06 PROCEDURE — 74177 CT ABD & PELVIS W/CONTRAST: CPT | Performed by: EMERGENCY MEDICINE

## 2021-04-06 PROCEDURE — 74176 CT ABD & PELVIS W/O CONTRAST: CPT | Performed by: PHYSICIAN ASSISTANT

## 2021-04-06 PROCEDURE — 99223 1ST HOSP IP/OBS HIGH 75: CPT | Performed by: INTERNAL MEDICINE

## 2021-04-06 RX ORDER — ACETAMINOPHEN 10 MG/ML
1000 INJECTION, SOLUTION INTRAVENOUS EVERY 6 HOURS
Status: DISCONTINUED | OUTPATIENT
Start: 2021-04-06 | End: 2021-04-09

## 2021-04-06 RX ORDER — METRONIDAZOLE 500 MG/100ML
500 INJECTION, SOLUTION INTRAVENOUS EVERY 8 HOURS
Status: DISCONTINUED | OUTPATIENT
Start: 2021-04-06 | End: 2021-04-06

## 2021-04-06 RX ORDER — SODIUM CHLORIDE, SODIUM LACTATE, POTASSIUM CHLORIDE, CALCIUM CHLORIDE 600; 310; 30; 20 MG/100ML; MG/100ML; MG/100ML; MG/100ML
INJECTION, SOLUTION INTRAVENOUS CONTINUOUS
Status: ACTIVE | OUTPATIENT
Start: 2021-04-06 | End: 2021-04-06

## 2021-04-06 RX ORDER — HYDROMORPHONE HYDROCHLORIDE 1 MG/ML
1 INJECTION, SOLUTION INTRAMUSCULAR; INTRAVENOUS; SUBCUTANEOUS ONCE
Status: COMPLETED | OUTPATIENT
Start: 2021-04-06 | End: 2021-04-06

## 2021-04-06 RX ORDER — POLYETHYLENE GLYCOL 3350 17 G/17G
17 POWDER, FOR SOLUTION ORAL DAILY PRN
Status: DISCONTINUED | OUTPATIENT
Start: 2021-04-06 | End: 2021-04-14

## 2021-04-06 RX ORDER — MELATONIN
3 NIGHTLY PRN
Status: DISCONTINUED | OUTPATIENT
Start: 2021-04-06 | End: 2021-04-14

## 2021-04-06 RX ORDER — ENOXAPARIN SODIUM 100 MG/ML
40 INJECTION SUBCUTANEOUS DAILY
Status: DISCONTINUED | OUTPATIENT
Start: 2021-04-06 | End: 2021-04-14

## 2021-04-06 RX ORDER — LEVOFLOXACIN 750 MG/1
750 TABLET ORAL DAILY
Status: DISCONTINUED | OUTPATIENT
Start: 2021-04-07 | End: 2021-04-14

## 2021-04-06 RX ORDER — ONDANSETRON 2 MG/ML
4 INJECTION INTRAMUSCULAR; INTRAVENOUS EVERY 6 HOURS PRN
Status: DISCONTINUED | OUTPATIENT
Start: 2021-04-06 | End: 2021-04-06

## 2021-04-06 RX ORDER — SODIUM PHOSPHATE, DIBASIC AND SODIUM PHOSPHATE, MONOBASIC 7; 19 G/133ML; G/133ML
1 ENEMA RECTAL ONCE AS NEEDED
Status: DISCONTINUED | OUTPATIENT
Start: 2021-04-06 | End: 2021-04-14

## 2021-04-06 RX ORDER — DEXAMETHASONE SODIUM PHOSPHATE 4 MG/ML
2 VIAL (ML) INJECTION DAILY
Status: DISCONTINUED | OUTPATIENT
Start: 2021-04-06 | End: 2021-04-13

## 2021-04-06 RX ORDER — DIPHENHYDRAMINE HYDROCHLORIDE 50 MG/ML
12.5 INJECTION INTRAMUSCULAR; INTRAVENOUS EVERY 4 HOURS PRN
Status: DISCONTINUED | OUTPATIENT
Start: 2021-04-06 | End: 2021-04-09

## 2021-04-06 RX ORDER — ONDANSETRON 2 MG/ML
4 INJECTION INTRAMUSCULAR; INTRAVENOUS EVERY 6 HOURS PRN
Status: DISCONTINUED | OUTPATIENT
Start: 2021-04-06 | End: 2021-04-13

## 2021-04-06 RX ORDER — METRONIDAZOLE 500 MG/1
500 TABLET ORAL EVERY 8 HOURS SCHEDULED
Status: DISCONTINUED | OUTPATIENT
Start: 2021-04-06 | End: 2021-04-14

## 2021-04-06 RX ORDER — NALOXONE HYDROCHLORIDE 0.4 MG/ML
0.08 INJECTION, SOLUTION INTRAMUSCULAR; INTRAVENOUS; SUBCUTANEOUS
Status: DISCONTINUED | OUTPATIENT
Start: 2021-04-06 | End: 2021-04-09

## 2021-04-06 RX ORDER — LEVOFLOXACIN 5 MG/ML
750 INJECTION, SOLUTION INTRAVENOUS EVERY 24 HOURS
Status: DISCONTINUED | OUTPATIENT
Start: 2021-04-06 | End: 2021-04-06

## 2021-04-06 RX ORDER — HYDROMORPHONE HYDROCHLORIDE 1 MG/ML
0.2 INJECTION, SOLUTION INTRAMUSCULAR; INTRAVENOUS; SUBCUTANEOUS EVERY 2 HOUR PRN
Status: DISCONTINUED | OUTPATIENT
Start: 2021-04-06 | End: 2021-04-06

## 2021-04-06 RX ORDER — BISACODYL 10 MG
10 SUPPOSITORY, RECTAL RECTAL
Status: DISCONTINUED | OUTPATIENT
Start: 2021-04-06 | End: 2021-04-14

## 2021-04-06 RX ORDER — ACETAMINOPHEN 325 MG/1
650 TABLET ORAL EVERY 6 HOURS PRN
Status: DISCONTINUED | OUTPATIENT
Start: 2021-04-06 | End: 2021-04-06

## 2021-04-06 RX ORDER — HYDROMORPHONE HYDROCHLORIDE 1 MG/ML
0.8 INJECTION, SOLUTION INTRAMUSCULAR; INTRAVENOUS; SUBCUTANEOUS EVERY 2 HOUR PRN
Status: DISCONTINUED | OUTPATIENT
Start: 2021-04-06 | End: 2021-04-06

## 2021-04-06 RX ORDER — HYDROMORPHONE HYDROCHLORIDE 1 MG/ML
0.4 INJECTION, SOLUTION INTRAMUSCULAR; INTRAVENOUS; SUBCUTANEOUS EVERY 2 HOUR PRN
Status: DISCONTINUED | OUTPATIENT
Start: 2021-04-06 | End: 2021-04-06

## 2021-04-06 RX ORDER — OXYCODONE HYDROCHLORIDE 10 MG/1
10 TABLET ORAL EVERY 4 HOURS
Status: DISCONTINUED | OUTPATIENT
Start: 2021-04-06 | End: 2021-04-09

## 2021-04-06 RX ORDER — KETOROLAC TROMETHAMINE 30 MG/ML
30 INJECTION, SOLUTION INTRAMUSCULAR; INTRAVENOUS EVERY 6 HOURS
Status: COMPLETED | OUTPATIENT
Start: 2021-04-06 | End: 2021-04-08

## 2021-04-06 NOTE — DIETARY NOTE
[facility]    NUTRITION ASSESSMENT    Pt meets acute - moderate malnutrition criteria. NUTRITION INTERVENTION:    1. Meal and Snacks - monitor patient po intake when diet advances. Previously tolerating General diet.  Encourage adequate po of approp per kg)  Protein:  grams protein/day (1.2-1.5 grams protein per kg)  Fluid: ~1 ml/kcal or per MD discretion    NUTRITION DIAGNOSIS/PROBLEM:  Malnutrition related to altered GI function/ insufficient appetite resulting in inadequate nutrition intake a

## 2021-04-06 NOTE — CONSULTS
INFECTIOUS DISEASE CONSULTATION    Destin Snell Patient Status:  Inpatient    1965 MRN [id number]   [facility] 3NW-A Attending Giulia Yip MD   Hosp Day # 0 PCP Gerhardt Guile 2/16/2021    Performed by Brandt Candelaria MD at [facility] ENDOSCOPY   • ESOPHAGOGASTRODUODENOSCOPY (EGD) with biopsies N/A 3/12/2021    Performed by Chandler Treviño MD at [place] [address] N/A 3/6/2021    Performed by Santa Reed, injection 2 mg, 2 mg, Intravenous, Daily  •  Fluticasone Furoate-Vilanterol (BREO ELLIPTA) 200-25 MCG/INH inhaler 1 puff, 1 puff, Inhalation, Daily  •  Pantoprazole Sodium (PROTONIX) 40 mg in Sodium Chloride (PF) 0.9 % 10 mL IV push, 40 mg, Intravenous, Q2 lungs 2 (two) times daily. , Disp: 3 Package, Rfl: 3  Sulfamethoxazole-TMP -160 MG Oral Tab per tablet, Take 1 tablet by mouth 3 (three) times a week., Disp: 12 tablet, Rfl: 0  Naloxone HCl 4 MG/0.1ML Nasal Liquid, 4 mg by Nasal route as needed.  If pa Component Value Date    COVID19 Not Detected 04/06/2021    COVID19 Not Detected 03/24/2021    COVID19 Not Detected 03/12/2021       Pro-Calcitonin  No results for input(s): PCT in the last 168 hours.     Cardiac  No results for input(s): TROP, PBNP in the Amira Weathers  [phone]

## 2021-04-06 NOTE — PROGRESS NOTES
NURSING ADMISSION NOTE      Patient admitted via Cart. Oriented to room. Safety precautions initiated. Bed in low position. Call light in reach.

## 2021-04-06 NOTE — PHYSICAL THERAPY NOTE
Attempted PT evaluation this AM, pt reporting high pain levels and is NPO for CT Abdomen to be done soon. Pt politely declining. Will continue to follow.

## 2021-04-06 NOTE — PLAN OF CARE
Upon assessment pt is a&o x4, VSS and afebrile. Pt denies calf pain, chest pain and AYUSH, but reports SOB related to his sarcoidosis. O2 sat 98% on room air, . Tele- NSR. NPO, denies n/v. Voids freely. Dilaudid given for pain per mar.  Up with standby ass Modify environment to reduce risk of injury  - Provide assistive devices as appropriate  - Consider OT/PT consult to assist with strengthening/mobility  - Encourage toileting schedule  Outcome: Progressing     Problem: DISCHARGE PLANNING  Goal: Discharge t

## 2021-04-06 NOTE — H&P
[facility]ITAL[facility]  History and Physical     Gregchito Alverto Patient Status:  Emergency    1965 MRN [id number]   Location [address] [facility] Attending Jodie Session, [address] Day # 0 PCP Levy Madrigal MD     Chief Co BRONCHOSCOPY N/A 2/16/2021    Performed by Cherre Bamberger, MD at [address] [facility] N/A 6/1/2020    Performed by Robert Schwartz MD at [facility] ENDOSCOPY   • COLONOSCOPY     • COLONOSCOPY  11/2015    4 small adeonomas, d capsule (100 mg total) by mouth 3 (three) times daily. , Disp: 90 capsule, Rfl: 0  tamsulosin (FLOMAX) cap, Take 1 capsule (0.4 mg total) by mouth nightly., Disp: 30 capsule, Rfl: 0  OxyCODONE HCl IR 10 MG Oral Tab, Take 1 tablet (10 mg total) by mouth ever Chest and Back: No tenderness or deformity. Abdomen: Soft, mild TTP LLQ/LUQ, nondistended. +ileostomy with brown stool present. Surgical drain site x3 c/d/i, surgical incision appears healing well. EDYTA drain #3 almost full with grayish appearing fluid.  P with slightly larger appearing pancreatic abscess compared to prior imaging  1. Surgery consulted from ED > plan for small bowel follow through  2. Continue current abx (will make levaquin/flagyl IV while NPO)  3.  NPO, IVF, pain control, anti-emetics PRN

## 2021-04-06 NOTE — HOME CARE LIAISON
Ptnt current with [facility] for sn/pt   Will need a Manish order on or before dc    Thanks  Leona Ontiveros

## 2021-04-06 NOTE — ED PROVIDER NOTES
Patient Seen in: [facility] Emergency Department      History   Patient presents with:  Postop/Procedure Problem: pt presents to er d/t drain 3 filling more than 10 ml an hour    Stated Complaint: excess draing fluid in drain 3 luq drain    HPI/Subj Gastritis, no hpylori on path   • ENDOBRONCHIAL ULTRASOUND (EBUS) N/A 2/16/2021    Performed by Vivienne Nayak MD at [facility] ENDOSCOPY   • ESOPHAGOGASTRODUODENOSCOPY (EGD) with biopsies N/A 3/12/2021    Performed by Suzanne Mario MD at [facility] ENDOSCOPY   • FL EDYTA drain LLQ minimal brownish fluid. Ostomy with brown stool  Skin: No rash. No edema. Neurologic: No focal neurologic deficits. Normal speech pattern  Musculoskeletal: No tenderness or deformity noted. Full range of motion throughout.         ED Cours same size from 3/24      Discussed with Dr Tanner Sinclair. Recommend admit for small bowel follow through.   Continue current antibiotics    Discussed with Dr Yoli Atkins for admission         Disposition and Plan     Clinical Impression:  Intraabdominal fluid collection

## 2021-04-06 NOTE — OCCUPATIONAL THERAPY NOTE
OCCUPATIONAL THERAPY EVALUATION - INPATIENT     Room Number: 325/325-A  Evaluation Date: 4/6/2021  Type of Evaluation: Initial  Presenting Problem: Abdominal Fluid Collection    Physician Order: IP Consult to Occupational Therapy  Reason for Therapy: ADL/I LIVER     • OTHER      biopsies   • OTHER SURGICAL HISTORY      septoplasty   • OTHER SURGICAL HISTORY      kidney stones   • TOTAL ABDOMINAL COLECTOMY, END ILEOSTOMY N/A 3/7/2021    Performed by Eunice Carpenter MD at [address] [place] [address] patient currently need…  -   Putting on and taking off regular lower body clothing?: A Little  -   Bathing (including washing, rinsing, drying)?: A Little  -   Toileting, which includes using toilet, bedpan or urinal? : A Little  -   Putting on and taking OT to address the above deficits, maximizing patient’s ability to return safely to his prior level of function.     Patient Complexity  Occupational Profile/Medical History MODERATE - Expanded review of history including review of medical or therapy record

## 2021-04-06 NOTE — PAYOR COMM NOTE
--------------  ADMISSION REVIEW     Payor: JAD KLINE  Subscriber #:  [id number]  Authorization Number: [id number]    Admit date: 4/6/21  Admit time:  3:18 AM       Patient Alfonso Carrillo in: [facility] Emergency Department    History   Patient presents with: other components within normal limits   MANUAL DIFFERENTIAL - Abnormal; Notable for the following components:    Neutrophil Absolute Manual 16.18 (*)     All other components within normal limits   CBC W/ DIFFERENTIAL - Abnormal; Notable for the following atraumatic. Moist mucous membranes. EOM-I. PERRLA. Anicteric. Neck: No lymphadenopathy. No JVD. No carotid bruits. Respiratory: Clear to auscultation bilaterally. No wheezes. No rhonchi. Cardiovascular: S1, S2. Regular rate and rhythm.  No murmurs, rubs 3/25  2. Further management as above  5. Sarcoidosis   1. PTA decadron (will convert to IV while NPO)  6. Hypertension  7. Anemia, stable  8. Deconditioning  1.  PT/OT    Quality:  · DVT Prophylaxis: lovenox  · CODE status: FULL      MEDICATIONS ADMINISTERE

## 2021-04-06 NOTE — PHYSICAL THERAPY NOTE
PHYSICAL THERAPY EVALUATION - INPATIENT     Room Number: 325/325-A  Evaluation Date: 4/6/2021  Type of Evaluation: Initial  Physician Order: PT Eval and Treat    Presenting Problem: intra-abdominal fluid collection  Reason for Therapy: Mobility Dysfu RDI 26 SaO2 clay 82 % CPAP 13 Premier   • Pain in joints    • Sarcoidosis    • Shortness of breath     no oxygen   • Sleep apnea    • Weight loss    • Wheezing        Past Surgical History  Past Surgical History:   Procedure Laterality Date   • BIOPSY/REM abdomen  Management Techniques: Activity promotion; Body mechanics;Breathing techniques;Relaxation;Repositioning    COGNITION  · Overall Cognitive Status:  WFL - within functional limits    RANGE OF MOTION AND STRENGTH ASSESSMENT  Upper extremity ROM and st episodes of LOB. Pt returned to bedside chair with CGA. HR reaching 125 bpm with activity. Updated pt on role of PT, POC, DC planning/recs, positioning.,activity. Exercise/Education Provided:   Body mechanics  Functional activity tolerated  Gait train supervision     Goal #3 Patient is able to ambulate 200 feet with assist device: walker - rolling at assistance level: supervision     Goal #4 Pt will be independent with HEP.     Goal #5    Goal #6    Goal Comments: Goals established on 4/6/2021  ATA murguia

## 2021-04-06 NOTE — CONSULTS
[facility]  Report of  Surgical Consultation with History and Physical Exam    Susannah Wolfe Patient Status:  Inpatient    1965 MRN [id number]   [facility] 3NW-A Attending Chapo Fields MD   Hosp Day # 0 PCP MELLISSA Singh sarcoidosis, sleep apnea, and pancreatitis. The patient's past surgical history is significant for subtotal colectomy with creation of ileostomy by Dr. Mikie Presley on 3/7. The patient denies taking any blood thinning medications.         History:  Past Medical (Other) Father         cva   • Diabetes Mother    • Heart Disorder Mother    • Other (Other) Mother         cva   • Diabetes Sister       reports that he has been smoking cigarettes. He has a 2.00 pack-year smoking history.  He has never used smokeless toba heartbeat/palpitations  Constitutional:  Negative for decreased activity, fever, irritability and lethargy  Endocrine:  Negative for abnormal sleep patterns, increased activity, polydipsia and polyphagia  ENMT:  Negative for ear drainage, hearing loss and There is gas and stool in the ostomy appliance. Extremities:  No lower extremity edema noted. Without clubbing or cyanosis. Skin: Normal texture and turgor.       Laboratory Data and Relevant X-rays:  Recent Labs   Lab 04/01/21  1141 04/05/21  2305 respectively. [place] Ion are better delineated.  As noted on prior study, this abscess is contiguous with right upper quadrant subhepatic abscess that measures 4.8 x 3.5 cm, without significant change in size which is drained   by a right-sided approach pratik bleeding     Second degree hemorrhoids     Acute gastritis     Bursitis of left shoulder     Biceps tendonitis, left     Rotator cuff tendonitis, left     Eye muscle twitches     Acute nonintractable headache     Arthralgia     Body aches     Gastroesophag patient and reviewed all relevant labs and reports. I agree with the above assessment and plan and again have modified the report to reflect my opinion.     I was notified yesterday by the patient's nurse at the rehab facility that there was a large amount our visit was spent in counseling the patient on the above listed diagnoses and treatment options.       Yamila Reich MD   [facility]  4/6/2021  5:11 PM

## 2021-04-06 NOTE — ED QUICK NOTES
Received report from [facility]. Pt just back from CT, waiting for results. Denies any complaints at this time.

## 2021-04-06 NOTE — CM/SW NOTE
04/06/21 1100   CM/SW Referral Data   Referral Source Social Work (self-referral)   Reason for Referral Discharge planning   Informant Patient   Patient Info   Patient's Mental Status Alert;Oriented   Discharge Needs   Anticipated D/C needs Other   Maira Falk

## 2021-04-06 NOTE — ED INITIAL ASSESSMENT (HPI)
Pt discharged Friday night, pt sent to [facility] rehab center, pt has x3 jay drains, jay drain # 3 luq / flank has more than 10cc per hour, pt pcp instructed pt to go to er , pt has 300ml per hour w/ jay drain, pt sarcodosis of lungs, pt aox4 ambulates w/o walke

## 2021-04-06 NOTE — PROGRESS NOTES
[facility]  Report of Consultation    Denise Mosamreen Patient Status:  Inpatient    1965 MRN [id number]   [facility] 3NW-A Attending Carolina Boswell MD   Hosp Day # 0 PCP Esau López MD     Date of Admission:  2021  Date [facility] ENDOSCOPY   • FLEXIBLE SIGMOIDOSCOPY N/A 3/6/2021    Performed by Brooke Hernandez MD at [facility] ENDOSCOPY   • NEEDLE BIOPSY LIVER     • OTHER      biopsies   • OTHER SURGICAL HISTORY      septoplasty   • OTHER SURGICAL HISTORY      kidney stones   • TOTA 0.08 mg, 0.08 mg, Intravenous, Q5 Min PRN  •  diphenhydrAMINE HCl (BENADRYL) IV PUSH injection 12.5 mg, 12.5 mg, Intravenous, Q4H PRN  •  acetaminophen (OFIRMEV) infusion 1,000 mg, 1,000 mg, Intravenous, Q6H  •  [START ON 4/7/2021] levofloxacin (LEVAQUIN) FINDINGS:     LIVER:  No enlargement, atrophy, abnormal density, or significant focal lesion.     BILIARY:  Status post cholecystectomy. PANCREAS:  No lesion, fluid collection, ductal dilatation, or atrophy.     SPLEEN:  No enlargement or focal lesion. and the duodenum by air in fluid.  There is stable mild lymphadenopathy within the mesentery     ABDOMINAL WALL:  Right lower quadrant ostomy noted.  Postsurgical changes are noted in the midline.    URINARY BLADDER:  Distended bladder which contains a smal Diverticulosis of large intestine without perforation or abscess without bleeding     Second degree hemorrhoids     Acute gastritis     Bursitis of left shoulder     Biceps tendonitis, left     Rotator cuff tendonitis, left     Eye muscle twitches     Acu

## 2021-04-07 PROCEDURE — 99232 SBSQ HOSP IP/OBS MODERATE 35: CPT | Performed by: INTERNAL MEDICINE

## 2021-04-07 RX ORDER — PANTOPRAZOLE SODIUM 40 MG/1
40 TABLET, DELAYED RELEASE ORAL
Status: DISCONTINUED | OUTPATIENT
Start: 2021-04-08 | End: 2021-04-14

## 2021-04-07 NOTE — PROGRESS NOTES
[facility]  Progress Note    Budd Route Patient Status:  Inpatient    1965 MRN [id number]   [facility] 3NW-A Attending Gulshan Cortes MD   Hosp Day # 1 PCP Carlos Masters MD     CC: Abdominal pain, surgical drain output Lab Results   Component Value Date    WBC 17.5 04/07/2021    HGB 10.1 04/07/2021    HCT 32.0 04/07/2021    .0 04/07/2021    CREATSERUM 0.51 04/07/2021    BUN 11 04/07/2021     04/07/2021    K 3.7 04/07/2021     04/07/2021    CO2 25.0 0 approach.  And is slightly measured larger measuring 15.1 x 8.7 by 5.2 cm.  This abscess contains numerous air bubbles and fluid without contrast.  This    abscess extends laterally into the right pericolic gutter and superiorly into the posterior right pa draining multiple intra-abdominal abscesses.  The dominant abscess in the root of the mesentery is slightly larger.  This has multiple extensions, superior 2 surrounding the SMA and portal vein adjacent to the   pancreas and 3rd portion of the duodenum, ri Intravenous, Q6H  levofloxacin (LEVAQUIN) tab 750 mg, 750 mg, Oral, Daily  metRONIDAZOLE (FLAGYL) tab 500 mg, 500 mg, Oral, Q8H RONALD  ketorolac tromethamine (TORADOL) 30 MG/ML injection 30 mg, 30 mg, Intravenous, Q6H  OxyCODONE HCl IR (ROXICODONE) immediate

## 2021-04-07 NOTE — PROGRESS NOTES
Acute Pain Service    PCA Follow-up Note    Indication: abdominal pain due to intra-abdominal abscesses     Patient reports pain \"medium\" across entire abdomen constant with intermittent sharp pain throughout lower abdomen.  He states his pain is well

## 2021-04-07 NOTE — PROGRESS NOTES
[facility]  Progress Note    Theta Keno Patient Status:  Inpatient    1965 MRN [id number]   [facility] 3NW-A Attending Stanley Fleischer, MD   Hosp Day # 1 PCP Maris Ye MD     Subjective: The patient is resting in bed. 4/7: FINDINGS:     LIVER:  No enlargement, atrophy, abnormal density, or significant focal lesion.     BILIARY:  Status post cholecystectomy.    PANCREAS:  No lesion, fluid collection, ductal dilatation, or atrophy.     SPLEEN:  No enlargement or focal les stomach and the duodenum by air in fluid.  There is stable mild lymphadenopathy within the mesentery     ABDOMINAL WALL:  Right lower quadrant ostomy noted.  Postsurgical changes are noted in the midline.    URINARY BLADDER:  Distended bladder which contain List:     Hyperlipidemia     Sarcoidosis     Lipoma of torso     Erectile dysfunction, unspecified erectile dysfunction type     Right low back pain     Diverticulosis of large intestine without perforation or abscess without bleeding     Second degree hem Ulises Gilliam MD   [facility]  4/7/2021  1:50 PM

## 2021-04-07 NOTE — PLAN OF CARE
Pt alert and oriented x 4. Up with stand by assist and walker. Lung sounds clear to diminished. Coarse to RUL. Dyspnea with exertion. Room air, . Telemetry running NSR on the monitor. Voiding with no difficulties. Abdomen soft and non-distended.  Bowel s call for assistance with activity based on assessment  - Modify environment to reduce risk of injury  - Provide assistive devices as appropriate  - Consider OT/PT consult to assist with strengthening/mobility  - Encourage toileting schedule  Outcome: Progr

## 2021-04-07 NOTE — PROGRESS NOTES
Pt reporting not having urge to urinate overnight. Bladder scan done showing approximately 300 ml of urine in bladder. Pt up to bathroom, voided 275 ml. Dr Cori Coto paged regarding low urine output. Awaiting call back, will continue to monitor.

## 2021-04-07 NOTE — PROGRESS NOTES
[facility]                INFECTIOUS DISEASE PROGRESS NOTE    Fedeolga Mccartysubha Patient Status:  Inpatient    1965 MRN [id number]   [facility] 3NW-A Attending Viviane Preston MD   Hosp Day # 1 PCP Mariano Baca MD     Antibi --  7.9* 8.4*   ALB 2.1*  --   --   --   --    *  --   --  134* 133*   K 5.1   < > 4.4 4.0 3.7     --   --  103 101   CO2 25.0  --   --  25.0 25.0   ALKPHO 97  --   --   --   --    AST 39*  --   --   --   --    ALT 27  --   --   --   --    BRANDON

## 2021-04-07 NOTE — PHYSICAL THERAPY NOTE
Attempted to see Pt this AM - RN aware of attempt. Pt agreeable to ambulate with writer in afternoon. Will f/u later today if time permits, after all other patients are attempted per tentative schedule.

## 2021-04-07 NOTE — PHYSICAL THERAPY NOTE
PHYSICAL THERAPY TREATMENT NOTE - INPATIENT    Room Number: 325/325-A     Session: 1   Number of Visits to Meet Established Goals: 6    Presenting Problem: intra-abdominal fluid collection    Problem List  Principal Problem:    Intraabdominal fluid collec [facility]  \"I am ready to get back to rehab\"      OBJECTIVE  Precautions: Drain(s); Bed/chair alarm    WEIGHT BEARING RESTRICTION  Weight Bearing Restriction: None                PAIN ASSESSMENT   Rating: Unable to safety.     Transfers    Rolling R:  Rolling L:  Supine<>Sit: CGA  Sit<>Stand: CGA  Gait: See above flow sheet  Chair Follow: NO  Sit<>Supine: NT  Stand<>Sit: CGA    Comments related to Mobility: Pt eager to work with Belle Patton - able to complete toileting wit device: walker - rolling at assistance level: supervision      Goal #4 Pt will be independent with HEP.     Goal #5     Goal #6     Goal Comments: Goals established on 4/6/2021 - all goals ongoing as of 4/7/2021     Writer PPE: surgical mask, goggles, gown

## 2021-04-07 NOTE — PROGRESS NOTES
04/07/21 0907   Clinical Encounter Type   Visited With Patient   Routine Visit Introduction  (AD requested and completed)   Continue Visiting No  (To request further ministry, pt will ask to see the )   Patient's Supportive Strategies/Resources

## 2021-04-07 NOTE — PLAN OF CARE
Patient is alert and oriented x4. Lung sounds have ronchi the right upper lobe. Inhaler given. Patient is on room air. Abdomen is soft/non-distended. Gas noted in the ostomy bag. 3 EDYTA drain with purulent output. Voids freely. Regular diet.  Patient denies n of injury  - Provide assistive devices as appropriate  - Consider OT/PT consult to assist with strengthening/mobility  - Encourage toileting schedule  Outcome: Progressing     Problem: DISCHARGE PLANNING  Goal: Discharge to home or other facility with appr

## 2021-04-07 NOTE — PLAN OF CARE
Patient resting in bed. VSS. 3 drains in place, compressed with purulent drainage noted. Patient NPO for CT. All questions and concerns addressed. All safety measures in place. Will continue to monitor.

## 2021-04-07 NOTE — CM/SW NOTE
11:10am  Updates sent to [employer]    12:37pm  [employer] is still pending for insurance auth.

## 2021-04-07 NOTE — PLAN OF CARE
Patient ambulating halls with PT. Pain 8/10, pain medication given earlier. Dr. Linda Yi notified, order for pain consult. RN spoke with [facility], new orders received, see MAR. POC discussed with patient, all questions and concerns addressed.  All safety

## 2021-04-07 NOTE — CONSULTS
[facility] Note: Route Optimization for Pantoprazole (PROTONIX)    Patient is currently on Pantoprazole (PROTONIX) 40 mg IV every 24 hours.    The patient meets the criteria to convert to the oral equivalent as established by the IV to Oral conversion pro

## 2021-04-08 PROCEDURE — 99232 SBSQ HOSP IP/OBS MODERATE 35: CPT | Performed by: INTERNAL MEDICINE

## 2021-04-08 NOTE — PROGRESS NOTES
[facility]                INFECTIOUS DISEASE PROGRESS NOTE    Edna Del Toro Patient Status:  Inpatient    1965 MRN [id number]   [facility] 3NW-A Attending Rosana Millan MD   Hosp Day # 2 PCP Levy Madrigal MD     Antibi 2.1*  --   --   --   --    *   < > 134* 133* 133*   K 5.1   < > 4.0 3.7 4.1      < > 103 101 103   CO2 25.0   < > 25.0 25.0 23.0   ALKPHO 97  --   --   --   --    AST 39*  --   --   --   --    ALT 27  --   --   --   --    BILT 0.4  --   --   --

## 2021-04-08 NOTE — PROGRESS NOTES
[facility]  Progress Note    Jim Gallegos Patient Status:  Inpatient    1965 MRN [id number]   [facility] 3NW-A Attending Katie Samuels MD   Hosp Day # 2 PCP Meme Edwards MD     CC: Abdominal pain, surgical drain output normal      Labs:   Lab Results   Component Value Date    WBC 16.4 04/08/2021    HGB 9.2 04/08/2021    HCT 29.8 04/08/2021    .0 04/08/2021    CREATSERUM 0.50 04/08/2021    BUN 12 04/08/2021     04/08/2021    K 4.1 04/08/2021     04/08/2 from a left-sided approach.  And is slightly measured larger measuring 15.1 x 8.7 by 5.2 cm.  This abscess contains numerous air bubbles and fluid without contrast.  This    abscess extends laterally into the right pericolic gutter and superiorly into the drainage catheters draining multiple intra-abdominal abscesses.  The dominant abscess in the root of the mesentery is slightly larger.  This has multiple extensions, superior 2 surrounding the SMA and portal vein adjacent to the   pancreas and 3rd portion Intravenous, Q6H  levofloxacin (LEVAQUIN) tab 750 mg, 750 mg, Oral, Daily  metRONIDAZOLE (FLAGYL) tab 500 mg, 500 mg, Oral, Q8H RONALD  OxyCODONE HCl IR (ROXICODONE) immediate release tab 10 mg, 10 mg, Oral, Q4H  HYDROmorphone (DILAUDID) 0.2 mg/ml PCA infusio

## 2021-04-08 NOTE — CM/SW NOTE
9:30am  MSW sent message to [employer] [place] to check on auth and update them pt is ready for dc. Await f/u from [facility]. 10:30am  MSW spoke to RN who states dc is possible, but no dc order in yet.   Auth pending  Pt could do medicar-not on 02.    1:45pm

## 2021-04-08 NOTE — OCCUPATIONAL THERAPY NOTE
OCCUPATIONAL THERAPY TREATMENT NOTE - INPATIENT     Room Number: 325/325-A  Session: 1   Number of Visits to Meet Established Goals: 5    Presenting Problem: Abdominal Fluid Collection    History related to current admission: Pt admit from [address] [place] [address] kidney stones   • TOTAL ABDOMINAL COLECTOMY, END ILEOSTOMY N/A 3/7/2021    [facility]  Pt stated, \"My pain is better. \"    Patient self-stated goal is to go home    OBJECTIVE  Precautions: Drain(s and pain management techniques. Patient End of Session: Up in chair;Needs met;Call light within reach; All patient questions and concerns addressed;SCDs in place; Ice applied; Family present    ASSESSMENT   Patient is a 54year old male admitted 4/5/2021 for

## 2021-04-08 NOTE — PLAN OF CARE
Patient is alert and oriented x4. Lung sounds with wheezing in bilateral lobes. Inhaler administered. Patient is on room air. Telemetry with ST. Heart rate 100-110's. Abdomen is soft and non-distended. Patient c/o abdominal pain.  Oxycodone and IV tylenol g assistance with activity based on assessment  - Modify environment to reduce risk of injury  - Provide assistive devices as appropriate  - Consider OT/PT consult to assist with strengthening/mobility  - Encourage toileting schedule  Outcome: Progressing

## 2021-04-08 NOTE — PLAN OF CARE
Patient A&Ox4, VSS. IV pain medication continued. PCA pump in use by patient.  and telemetry monitoring continued. Ileostomy in place as well as 3 jay drains, little output this shift. Tolerating diet. Isolation maintained.  Questions and concerns address PLANNING  Goal: Discharge to home or other facility with appropriate resources  Description: INTERVENTIONS:  - Identify barriers to discharge w/pt and caregiver  - Include patient/family/discharge partner in discharge planning  - Arrange for needed dischar

## 2021-04-08 NOTE — DIETARY MALNUTRITION NOTE
[facility]    NUTRITION ASSESSMENT    Pt meets acute - moderate malnutrition criteria. NUTRITION INTERVENTION:    1. Meal and Snacks - monitor patient po intake. Previously tolerating General diet.  Encourage adequate po of appropriate diet once [facility] 50-75%  Intake Meeting Needs: No  Food Allergies: No  Cultural/Ethnic/Hindu Preferences Addresses: Yes    GI SYSTEM REVIEW: WNL    NUTRITION RELATED PHYSICAL FINDINGS:     1. Body Fat/Muscle Mass:Mild wasting     2.  Fluid Accumulation: none     NUTRITI

## 2021-04-08 NOTE — PROGRESS NOTES
Acute Pain Service    PCA Follow-up Note    Indication: Other: abdominal pain due to intra-abdominal abscesses. Pt sitting up in chair, eating. Wife at bedside. Pt states he continues to have pain across his abdomen today.  He states his pain is [place]

## 2021-04-08 NOTE — PROGRESS NOTES
[facility]  Progress Note    Dwain Dexter Patient Status:  Inpatient    1965 MRN [id number]   [facility] 3NW-A Attending Ju Louis MD   Hosp Day # 2 PCP Sha Ochoa MD     Subjective:  Patient reports increased drain of large intestine without perforation or abscess without bleeding     Second degree hemorrhoids     Acute gastritis     Bursitis of left shoulder     Biceps tendonitis, left     Rotator cuff tendonitis, left     Eye muscle twitches     Acute nonintractabl

## 2021-04-09 PROCEDURE — 99232 SBSQ HOSP IP/OBS MODERATE 35: CPT | Performed by: INTERNAL MEDICINE

## 2021-04-09 RX ORDER — HYDROMORPHONE HYDROCHLORIDE 1 MG/ML
0.2 INJECTION, SOLUTION INTRAMUSCULAR; INTRAVENOUS; SUBCUTANEOUS EVERY 2 HOUR PRN
Status: DISCONTINUED | OUTPATIENT
Start: 2021-04-09 | End: 2021-04-12

## 2021-04-09 RX ORDER — HYDROMORPHONE HYDROCHLORIDE 1 MG/ML
0.4 INJECTION, SOLUTION INTRAMUSCULAR; INTRAVENOUS; SUBCUTANEOUS EVERY 2 HOUR PRN
Status: DISCONTINUED | OUTPATIENT
Start: 2021-04-09 | End: 2021-04-12

## 2021-04-09 RX ORDER — OXYCODONE HYDROCHLORIDE 15 MG/1
15 TABLET ORAL EVERY 4 HOURS
Status: DISCONTINUED | OUTPATIENT
Start: 2021-04-09 | End: 2021-04-12

## 2021-04-09 RX ORDER — OXYCODONE HYDROCHLORIDE 15 MG/1
15 TABLET ORAL EVERY 4 HOURS PRN
Status: DISCONTINUED | OUTPATIENT
Start: 2021-04-09 | End: 2021-04-12

## 2021-04-09 RX ORDER — ACETAMINOPHEN 500 MG
1000 TABLET ORAL
Status: DISCONTINUED | OUTPATIENT
Start: 2021-04-09 | End: 2021-04-14

## 2021-04-09 NOTE — PROGRESS NOTES
Notified by Jonelle San that patient does not qualify through insurance to go back to [facility]. Plan is for patient to go home with home health. Patient updated regarding poc by Wilfredo Wyatt RN in with patient at this time and patient voiced concerns wi

## 2021-04-09 NOTE — PROGRESS NOTES
[facility]  Progress Note    Niraj Bolanos Patient Status:  Inpatient    1965 MRN [id number]   [facility] 3NW-A Attending Tanesha Pacheco MD   Hosp Day # 3 PCP Joyce Johnson MD     CC: Abdominal pain, surgical drain output auscultation bilaterally  Heart: S1, S2 normal, no murmur,  regular rate and rhythm  Abdomen: soft, mild tenderness on left side; bowel sounds normal; ileostomy present with brown semiformed stool.   3 surgical drains present with some purulent drainage,  collecting system.  No hydronephrosis. ADRENALS:  No mass or enlargement.     AORTA/VASCULAR:    Unremarkable as seen on non-contrast imaging.    RETROPERITONEUM:  No mass or adenopathy.     BOWEL/MESENTERY: Tamicasa Oscar is a right-sided drainage catheter from adenopathy.     PELVIC ORGANS:  Mildly enlarged prostate.    BONES:  No bony lesion or fracture.  Degenerative disc disease unchanged in the short interval.   LUNG BASES:  Again noted is extensive ground-glass opacities in the visualized lungs.  Findings arceo 10 mg, 10 mg, Rectal, Daily PRN  Fleet Enema (FLEET) 7-19 GM/118ML enema 133 mL, 1 enema, Rectal, Once PRN  ondansetron HCl (ZOFRAN) injection 4 mg, 4 mg, Intravenous, Q6H PRN  Enoxaparin Sodium (LOVENOX) 40 MG/0.4ML injection 40 mg, 40 mg, Subcutaneous, D controlled on oral / topical pain medications and off pain pump  At this point Mr. Nicholas Echevarria  is expected to be discharge to: [facility]      Questions/concerns and Plan of care were discussed with patient .   Discussed with RN    Discharge planning in progress, aw

## 2021-04-09 NOTE — PROGRESS NOTES
[facility]                INFECTIOUS DISEASE PROGRESS NOTE    Neptali Vargas Patient Status:  Inpatient    1965 MRN [id number]   [facility] 3NW-A Attending Charlie Antunez MD   Hosp Day # 3 PCP Heidy Thompson MD     Antibi 04/06/21  0047 04/07/21  0725 04/08/21  0557 04/09/21  1026   *   < > 75 84 121*   BUN 10   < > 11 12 10   CREATSERUM 0.62*   < > 0.51* 0.50* 0.39*   GFRAA 129   < > 140 141 156   GFRNAA 112   < > 121 122 135   CA 8.0*   < > 8.4* 8.3* 8.0*   ALB 2.1 drains and repeat CT in the future  Can continue existing antibiotics in the interim    No objection to [address], MD  [facility]  [phone]

## 2021-04-09 NOTE — PAYOR COMM NOTE
--------------  CONTINUED STAY REVIEW    Payor: JAD KLINE  Subscriber #:  [id number]  Authorization Number: [id number]    Admit date: 4/6/21  Admit time:  3:18 AM    FAXING CLINICAL UPDATE FOR 4/9/21 4/9/21   Subjective:   The patient reports abdominal 1,000 mg Intravenous     4/8/2021 1532 New Bag 1,000 mg Intravenous       acetaminophen (TYLENOL EXTRA STRENGTH) tab 1,000 mg     Date Action Dose Route     4/9/2021 1244 Given 1,000 mg Oral       dexamethasone Sodium Phosphate (DECADRON) 4 MG/ML injection

## 2021-04-09 NOTE — PROGRESS NOTES
Vss. Pt resting in bedside chair at this time. Pt ambulated in halls this am with walker without difficulty. Tolerating diet well for breakfast. Denies n/v. Passing stool and flatus through colostomy appliance. Appliance intact. Stoma pink.  Drains x3 in pl

## 2021-04-09 NOTE — PHYSICAL THERAPY NOTE
PHYSICAL THERAPY TREATMENT NOTE - INPATIENT    Room Number: 325/325-A     Session: 2  Number of Visits to Meet Established Goals: 6    Presenting Problem: intra-abdominal fluid collection    Problem List  Principal Problem:    Intraabdominal fluid collect [facility]  \"I am upset frankly\" - Pt provided social support    OBJECTIVE  Precautions: Drain(s); Bed/chair alarm    WEIGHT BEARING RESTRICTION  Weight Bearing Restriction: None                PAIN ASSESSMENT   Rati independence and safety.     Transfers    Rolling R:  Rolling L:  Supine<>Sit: NT  Sit<>Stand: SBA  Gait: See above flow sheet  Chair Follow: NO  Sit<>Supine: NT  Stand<>Sit: SBA    Comments related to Mobility: Pt frustrated with current dc planning and wa able to demonstrate transfers Sit to/from Stand at assistance level: supervision - met 4/9/2021       Goal #3 Patient is able to ambulate 200 feet with assist device: walker - rolling at assistance level: supervision - met 4/9/2021       Goal #4 Pt will be

## 2021-04-09 NOTE — PROGRESS NOTES
Acute Pain Service     PCA Follow-up Note     Indication: abdominal pain due to intra-abdominal abscesses     Patient reports pain 6/10 at rest 8/10 with activity - pain is across lower abdomen ' deep ache with intermittent sharp severe pain even at rest.

## 2021-04-09 NOTE — PLAN OF CARE
Patient A&Ox4, VSS. IV pain meds continued, PCA pump in use by patient. Gaurang drains in place, ileostomy with adequate output. Questions and concerns addressed none further at this time.    Problem: PAIN - ADULT  Goal: Verbalizes/displays adequate comfort leve Identify barriers to discharge w/pt and caregiver  - Include patient/family/discharge partner in discharge planning  - Arrange for needed discharge resources and transportation as appropriate  - Identify discharge learning needs (meds, wound care, etc)  -

## 2021-04-09 NOTE — CM/SW NOTE
9:57am  Updates sent via [employer].   MSW left message for [facility] admissions and sent message in [address] to f/u on patient's pain medication OxyIR.     10:48am  Insurance denied [facility], Peer to Peer pending with SNF MD.    2:13pm  MSW met with pt and discussed that

## 2021-04-09 NOTE — PROGRESS NOTES
[facility]  Progress Note    Ro Bush Patient Status:  Inpatient    1965 MRN [id number]   [facility] 3NW-A Attending Xavier Berkowitz MD   Hosp Day # 3 PCP Eufemia Rodriguez MD     Subjective:   The patient reports abdominal p left     Eye muscle twitches     Acute nonintractable headache     Arthralgia     Body aches     Gastroesophageal reflux disease     NAFLD (nonalcoholic fatty liver disease)     Hypokalemia     Leukocytosis     SALLY (acute kidney injury) ([address]     Hyponatre

## 2021-04-10 PROCEDURE — 99232 SBSQ HOSP IP/OBS MODERATE 35: CPT | Performed by: INTERNAL MEDICINE

## 2021-04-10 RX ORDER — MAGNESIUM OXIDE 400 MG (241.3 MG MAGNESIUM) TABLET
800 TABLET ONCE
Status: COMPLETED | OUTPATIENT
Start: 2021-04-10 | End: 2021-04-10

## 2021-04-10 NOTE — PROGRESS NOTES
Dr. Patricia Tracy paged regarding elevated HR and new orders received. EKG completed. Orthostatic completed and bp low while patient standing. Notified by lab that magnesium critical at 1.1. Dr. Patricia Tracy paged regarding above. Will continue to monitor.      1600: Ne

## 2021-04-10 NOTE — PROGRESS NOTES
Acute Pain Service     PCA Follow-up Note     Indication: abdominal pain due to intra-abdominal abscesses      Patient reports pain 6/10 - pain is across lower abdomen     PERTINENT MEDICATIONS:     Coanalgesics:     OxyIR 10mg po q4h scheduled  [address]

## 2021-04-10 NOTE — PROGRESS NOTES
04/10/21 1408 04/10/21 1409 04/10/21 1417   Vital Signs   Pulse 111 (!) 126 (!) 141   Heart Rate Source Monitor  --   --    Resp 22 26 18   BP 96/71 102/70 (!) 84/68  (nurse notified )   MAP (mmHg) 77 77 72   BP Location Right arm Right arm Right arm

## 2021-04-10 NOTE — PROGRESS NOTES
[facility]  Progress Note    Veronicakat Hogue Patient Status:  Inpatient    1965 MRN [id number]   [facility] 3NW-A Attending Yoel Garner MD   Hosp Day # 4 PCP Collins Ochoa MD     Subjective:   The patient is sitting up in th (nonalcoholic fatty liver disease)     Hypokalemia     Leukocytosis     SALLY (acute kidney injury) ([facility] [address]     Hyponatremia     Adjustment disorder with mixed anxiety and depressed mood     Pancreatic mass     Drug-induced constipation     C. difficile coliti

## 2021-04-10 NOTE — PLAN OF CARE
Alert and oriented  Slept well overnight, pain controlled with prescribed medication. Drains intact and set to suction  Vss, will continue to monitor.     Problem: PAIN - ADULT  Goal: Verbalizes/displays adequate comfort level or patient's stated pain goal w/pt and caregiver  - Include patient/family/discharge partner in discharge planning  - Arrange for needed discharge resources and transportation as appropriate  - Identify discharge learning needs (meds, wound care, etc)  - Arrange for interpreters to ass

## 2021-04-10 NOTE — PROGRESS NOTES
[facility]  Progress Note    Angel Galloway Patient Status:  Inpatient    1965 MRN [id number]   [facility] 3NW-A Attending Gautam Leach MD   Hosp Day # 4 PCP Anabell Medina MD     CC: Abdominal pain, surgical drain output on left side; bowel sounds normal; ileostomy present with brown semiformed stool.   3 surgical drains present with some purulent drainage, drainage decreasing from yesterday according to patient  Extremities: extremities normal,no cyanosis or edema  Pulses: non-contrast imaging.    RETROPERITONEUM:  No mass or adenopathy.     BOWEL/MESENTERY: Kiran Mendez is a right-sided drainage catheter from a inferior lateral right upper pelvic approach which extends in the right pericolic fluid collection and extends into the r unchanged in the short interval.   LUNG BASES:  Again noted is extensive ground-glass opacities in the visualized lungs.  Findings suggest either small airway disease or changes related chronic pulmonary emboli.  Scarring is noted in the lung bases.  Right Daily PRN  bisacodyl (DULCOLAX) rectal suppository 10 mg, 10 mg, Rectal, Daily PRN  Fleet Enema (FLEET) 7-19 GM/118ML enema 133 mL, 1 enema, Rectal, Once PRN  ondansetron HCl (ZOFRAN) injection 4 mg, 4 mg, Intravenous, Q6H PRN  Enoxaparin Sodium (LOVENOX) Clinical progress, ID and surgery clearance, pain controlled on oral / topical pain medications   At this point Mr. Rossy Alvarez  is expected to be discharge to: PARMJIT      Questions/concerns and Plan of care were discussed with patient .   Discussed with RN    D

## 2021-04-10 NOTE — PROGRESS NOTES
Vss. Hr slightly more elevated today. At rest, -120's and with ambulation HR up to 150's at times. Other VSS. Pt denies chest pain or sob outside normal pain from sarcoidosis. Pt on ra with 02 sats wnl. Lungs cta. IS use encouraged, pt reaching 1000.

## 2021-04-11 PROCEDURE — 99232 SBSQ HOSP IP/OBS MODERATE 35: CPT | Performed by: INTERNAL MEDICINE

## 2021-04-11 RX ORDER — SODIUM CHLORIDE 9 MG/ML
INJECTION, SOLUTION INTRAVENOUS CONTINUOUS
Status: DISCONTINUED | OUTPATIENT
Start: 2021-04-11 | End: 2021-04-14

## 2021-04-11 RX ORDER — MAGNESIUM OXIDE 400 MG (241.3 MG MAGNESIUM) TABLET
800 TABLET ONCE
Status: COMPLETED | OUTPATIENT
Start: 2021-04-11 | End: 2021-04-11

## 2021-04-11 NOTE — PROGRESS NOTES
[facility]  Progress Note    Fede Vasquez Patient Status:  Inpatient    1965 MRN [id number]   [facility] 3NW-A Attending Viviane Preston MD   Hosp Day # 5 PCP Mariano Baca MD     Subjective: The patient is resting in bed. Gastroesophageal reflux disease     NAFLD (nonalcoholic fatty liver disease)     Hypokalemia     Leukocytosis     SALLY (acute kidney injury) ([facility] [address]     Hyponatremia     Adjustment disorder with mixed anxiety and depressed mood     Pancreatic mass     Drug-in

## 2021-04-11 NOTE — PLAN OF CARE
Pt c/o of 8/10 pain , getting prn and scheduled pain med's. Old incision with glue cdi. Gaurang drain with milky drainage, sites leaking, dressing changed.   Problem: PAIN - ADULT  Goal: Verbalizes/displays adequate comfort level or patient's stated pain goal

## 2021-04-11 NOTE — PROGRESS NOTES
[facility]  Progress Note    Maurie Dance Patient Status:  Inpatient    1965 MRN [id number]   [facility] [place] 3NW-A Attending Manisha Delgadillo MD   Hosp Day # 5 PCP Lemuel Burks MD     CC: Abdominal pain, surgical drain output side; bowel sounds normal; ileostomy present with brown semiformed stool.   3 surgical drains present with some purulent drainage, drainage decreasing from yesterday according to patient  Extremities: extremities normal,no cyanosis or edema  Pulses: 2+ and extends into the region of the gastrohepatic ligament distally. Sandra Miller is a large   abscess within the root of the mesentery containing an additional drainage catheter from a left-sided approach.  And is slightly measured larger measuring 15.1 x 8.7 by 5.2 bases.  Right-sided staples are noted in the   lateral right lung base. OTHER:  Negative.                          Impression   CONCLUSION:     1.  There are multiple drainage catheters draining multiple intra-abdominal abscesses.  The dominant abscess in mg, 4 mg, Intravenous, Q6H PRN  Enoxaparin Sodium (LOVENOX) 40 MG/0.4ML injection 40 mg, 40 mg, Subcutaneous, Daily  dexamethasone Sodium Phosphate (DECADRON) 4 MG/ML injection 2 mg, 2 mg, Intravenous, Daily  Fluticasone Furoate-Vilanterol (BREO ELLIPTA) 2 discharge: TBD  Discharge is dependent on: Clinical progress, ID and surgery clearance, pain controlled on oral / topical pain medications, orthostatics improves and replace electrolytes  At this point Mr. Jocelyne Abernathy  is expected to be discharge to: PARMJIT

## 2021-04-11 NOTE — PROGRESS NOTES
The patient has stable vital signs and he is ambulating to the bathroom x 1 assist with a steady gait. He has no complaint of lightheadedness or dizziness with ambulation.

## 2021-04-11 NOTE — PLAN OF CARE
4/11 0000: Pt received AOx4. Room air. SR. NICOLE saline lock. JPx3 and Colostomy. 0600: Pt needs pain meds as scheduled and prn. EDYTA #2 drained 60ml and #3 and #4 EDYTA with no output. BRP with walker-tolerated. Voiding.      Problem: PAIN - ADULT  Goal: Desire Marroquin learning needs (meds, wound care, etc)  - Arrange for interpreters to assist at discharge as needed  - Consider post-discharge preferences of patient/family/discharge partner  - Complete POLST form as appropriate  - Assess patient's ability to be responsib

## 2021-04-12 PROCEDURE — 99232 SBSQ HOSP IP/OBS MODERATE 35: CPT | Performed by: INTERNAL MEDICINE

## 2021-04-12 RX ORDER — HYDROMORPHONE HYDROCHLORIDE 1 MG/ML
0.5 INJECTION, SOLUTION INTRAMUSCULAR; INTRAVENOUS; SUBCUTANEOUS EVERY 2 HOUR PRN
Status: DISCONTINUED | OUTPATIENT
Start: 2021-04-12 | End: 2021-04-14

## 2021-04-12 RX ORDER — HYDROMORPHONE HYDROCHLORIDE 1 MG/ML
1 INJECTION, SOLUTION INTRAMUSCULAR; INTRAVENOUS; SUBCUTANEOUS EVERY 2 HOUR PRN
Status: DISCONTINUED | OUTPATIENT
Start: 2021-04-12 | End: 2021-04-14

## 2021-04-12 RX ORDER — OXYCODONE HCL 10 MG/1
20 TABLET, FILM COATED, EXTENDED RELEASE ORAL EVERY 8 HOURS
Status: DISCONTINUED | OUTPATIENT
Start: 2021-04-12 | End: 2021-04-13

## 2021-04-12 RX ORDER — OXYCODONE HYDROCHLORIDE 10 MG/1
20 TABLET ORAL EVERY 4 HOURS PRN
Status: DISCONTINUED | OUTPATIENT
Start: 2021-04-12 | End: 2021-04-13

## 2021-04-12 RX ORDER — MAGNESIUM OXIDE 400 MG (241.3 MG MAGNESIUM) TABLET
800 TABLET ONCE
Status: COMPLETED | OUTPATIENT
Start: 2021-04-12 | End: 2021-04-12

## 2021-04-12 RX ORDER — HYDROMORPHONE HYDROCHLORIDE 1 MG/ML
1 INJECTION, SOLUTION INTRAMUSCULAR; INTRAVENOUS; SUBCUTANEOUS EVERY 2 HOUR PRN
Status: DISCONTINUED | OUTPATIENT
Start: 2021-04-12 | End: 2021-04-12

## 2021-04-12 NOTE — CM/SW NOTE
11:36am  MSW updated pt's wife on plan for [employer] to re-submit for auth. MSW attempted earlier to speak to pt but he was a sleep. MSW spoke to [employer] about re-auth submission. Manager for OT paged. Barrier to dc:  Insurance auth pending

## 2021-04-12 NOTE — PROGRESS NOTES
[facility]  Progress Note    Erla Ear Patient Status:  Inpatient    1965 MRN [id number]   [facility] 3NW-A Attending Kei Trevino MD   Hosp Day # 6 PCP Graham Herrera MD     CC: Abdominal pain, surgical drain output lesions  Neurologic: Awake,alert,nonfocal  Psych: Mood and affect appears normal      Labs:   Lab Results   Component Value Date    MG 1.1 04/12/2021       Imaging:    CT ABDOMEN+PELVIS (CPT=74176)       COMPARISON:  [facility] , CT, CT ABDOMEN PELVIS IV CONTR  abscess extends laterally into the right pericolic gutter and superiorly into the posterior right pararenal space up to the level the diaphragm.  There is some inferior extension centrally and to the left of midline which extends into the superior   mid superior 2 surrounding the SMA and portal vein adjacent to the   pancreas and 3rd portion of the duodenum, right pericolic gutter, posterior right pararenal space extending cephalad to the diaphragm and inferiorly in the right pericolic gutter to the mid p 750 mg, Oral, Daily  metRONIDAZOLE (FLAGYL) tab 500 mg, 500 mg, Oral, Q8H [address]        ASSESSMENT / PLAN:     1. Intra-abdominal abscess  1. Seen by ID and surgeon  2. On antibiotics Levaquin and Flagyl per ID  3.  No surgical interventions planned at this po subcutaneous Lovenox  · CODE status: full  · [place]: no  · Central line: no    Will the patient be referred to TCC on discharge?: yes  Estimated date of discharge: TBD  Discharge is dependent on: Clinical progress, ID and surgery clearance, pain controlled

## 2021-04-12 NOTE — DIETARY MALNUTRITION NOTE
[facility]    NUTRITION ASSESSMENT    Pt meets acute - moderate malnutrition criteria. NUTRITION INTERVENTION:    1. Meal and Snacks - monitor patient po intake. Previously tolerating General diet.  Encourage adequate po of appropriate diet once [facility] 04/10/21 0849  —    Percent Meals Eaten (%): just ordered breakfast at 04/10/21 0849    04/10/21 1228  —    Percent Meals Eaten (%): will order lunch soon at 04/10/21 1228    04/10/21 1639  —    Percent Meals Eaten (%): will order dinner soon at 04/10/21 1

## 2021-04-12 NOTE — OCCUPATIONAL THERAPY NOTE
OCCUPATIONAL THERAPY TREATMENT NOTE - INPATIENT     Room Number: 325/325-A  Session: 2   Number of Visits to Meet Established Goals: 5    Presenting Problem: Abdominal Fluid Collection    History related to current admission: Pt admit from [address] [place] [address] SURGICAL HISTORY      septoplasty   • OTHER SURGICAL HISTORY      kidney stones   • TOTAL ABDOMINAL COLECTOMY, END ILEOSTOMY N/A 3/7/2021    [facility]  Pt stated, \"My pain is a little worse today toilet level     Pt left sitting in chair with alarm on. Pt educated on OOB activity and pain management techniques. Patient End of Session: Up in chair;Needs met;Call light within reach; All patient questions and concerns addressed;SCDs in place    ASSES

## 2021-04-12 NOTE — PLAN OF CARE
Patient A&Ox4, VSS. Pain managed with prn and scheduled medication. IVF continued. Tachycardic with ambulation. EDYTA drain in place, still with milky drainage. Ileostomy with good output. Tolerating diet. Concerns addressed, none further at this time.    Prob facility with appropriate resources  Description: INTERVENTIONS:  - Identify barriers to discharge w/pt and caregiver  - Include patient/family/discharge partner in discharge planning  - Arrange for needed discharge resources and transportation as appropri

## 2021-04-12 NOTE — PHYSICAL THERAPY NOTE
PHYSICAL THERAPY TREATMENT NOTE - INPATIENT    Room Number: 325/325-A     Session: 3  Number of Visits to Meet Established Goals: 6    Presenting Problem: intra-abdominal fluid collection    Problem List  Principal Problem:    Intraabdominal fluid collect COLECTOMY, END ILEOSTOMY N/A 3/7/2021    Performed by Rossy Haines MD at [facility] 214  \"I have been in the hosp too long. The pain is too much but I am willing to walk agaian\"     OBJECTIVE  Precautions: Drain(s); Bed/chair alarm independence and push himself as far as he can in spite of his pain. Mod I in her transfers with SBA in his bed mobilities. Gt is slow and steady with RW as support SBA.  Left on the recliner and discussed importance of mobilities while in hops and cont MAGGI device: walker - rolling at assistance level: supervision - met 4/9/2021       Goal #4 Pt will be independent with HEP.     Goal #5 Patient is able to complete full flight of stairs - new goal 4/9/2021    Goal #6     Goal Comments: Goals established on 4/6/

## 2021-04-12 NOTE — PLAN OF CARE
Patient A/O X 4, VSS, IVF infusing per orders. Medicated with dilaudid PRN and oxy scheduled for pain. Patient states pain is worse today, pain management aware and adjusting meds. EDYTA x 3 draining, milky drainage to drain 2.  Colostomy with liquid stool to PLANNING  Goal: Discharge to home or other facility with appropriate resources  Description: INTERVENTIONS:  - Identify barriers to discharge w/pt and caregiver  - Include patient/family/discharge partner in discharge planning  - Arrange for needed dischar

## 2021-04-12 NOTE — PROGRESS NOTES
Acute Pain Service    Patient reports pain is severe at present 8/10 across lower abdomen.  States pain worsens with activity - especially after ambulation but even increases when he is sitting in recliner at rest. States pain has been \"medium\" but over t

## 2021-04-12 NOTE — PROGRESS NOTES
[facility]  Progress Note    Ravin Del Cid Patient Status:  Inpatient    1965 MRN [id number]   [facility] 3NW-A Attending Parish Loyd MD   Hosp Day # 6 PCP Ramírez Atkins MD     Subjective:   The patient is sitting up in th Arthralgia     Body aches     Gastroesophageal reflux disease     NAFLD (nonalcoholic fatty liver disease)     Hypokalemia     Leukocytosis     SALLY (acute kidney injury) ([facility] [address]     Hyponatremia     Adjustment disorder with mixed anxiety and depressed mood

## 2021-04-12 NOTE — PAYOR COMM NOTE
--------------  CONTINUED STAY REVIEW    Payor: [employer]AD KLINE  Subscriber #:  [id number]  Authorization Number: [id number]    Admit date: 4/6/21  Admit time:  3:18 AM    FAXING CLINICAL UPDATE OFR 4/11/21 4/11/21   CC:  Abdominal pain, surgical drain output 1.1 04/11/2021         ASSESSMENT / PLAN:      1. Intra-abdominal abscess  1. Seen by ID and surgeon  2. On antibiotics Levaquin and Flagyl per ID  3. No surgical interventions planned at this point per surgeon  4. On regular diet per surgeon  5.  Patient a Joselito Singh RN      Enoxaparin Sodium (LOVENOX) 40 MG/0.4ML injection 40 mg     Date Action Dose Route User    4/12/2021 0901 Given 40 mg Subcutaneous (Left Lower Abdomen) Dillon Lion RN      Fluticasone Furoate-Vilanterol (BREO ELLIPTA) 200-25 MCG/INH inhaler 4/12/2021 0544 Given 40 mg Oral Sadiq Arteaga RN      0.9% NaCl infusion     Date Action Dose Route User    4/12/2021 0900 New Bag (none) Intravenous Andrea Romero RN    4/11/2021 2118 New [facility] (none) [facility]    4/11/2021 1014 New Bag

## 2021-04-12 NOTE — CM/SW NOTE
Care Progression Note:  Active Acute Medical Issue:   Intraabdominal fluid collection     Other Contributing Medical Factors/Dx. : intra-abdominal abscess- on levaquin & Flagyl.  Sarcoidosis, GERD, Leukocytosis, Orthostatic hypotension, Hx of c-diff, previou

## 2021-04-13 PROCEDURE — 99232 SBSQ HOSP IP/OBS MODERATE 35: CPT | Performed by: INTERNAL MEDICINE

## 2021-04-13 RX ORDER — MAGNESIUM SULFATE HEPTAHYDRATE 40 MG/ML
2 INJECTION, SOLUTION INTRAVENOUS ONCE
Status: COMPLETED | OUTPATIENT
Start: 2021-04-13 | End: 2021-04-13

## 2021-04-13 RX ORDER — OXYCODONE HYDROCHLORIDE 10 MG/1
20 TABLET ORAL EVERY 4 HOURS
Status: DISCONTINUED | OUTPATIENT
Start: 2021-04-13 | End: 2021-04-14

## 2021-04-13 RX ORDER — OXYCODONE HCL 10 MG/1
20 TABLET, FILM COATED, EXTENDED RELEASE ORAL 3 TIMES DAILY
Status: DISCONTINUED | OUTPATIENT
Start: 2021-04-13 | End: 2021-04-14

## 2021-04-13 RX ORDER — DEXAMETHASONE 2 MG/1
2 TABLET ORAL
Status: DISCONTINUED | OUTPATIENT
Start: 2021-04-14 | End: 2021-04-14

## 2021-04-13 RX ORDER — ONDANSETRON 2 MG/ML
4 INJECTION INTRAMUSCULAR; INTRAVENOUS EVERY 4 HOURS
Status: DISCONTINUED | OUTPATIENT
Start: 2021-04-13 | End: 2021-04-14

## 2021-04-13 NOTE — PLAN OF CARE
Patient A/O X 4, VSS, IVF infusing per orders. Colostomy with brown stool. EDYTA X 3 with milky drainage. Healing midline incision CDI. Patient woke up rating pain 8/10 and nauseated. Medicated with PRN dilaudid, scheduled oxy, zofran.  Will continue to reases transportation as appropriate  - Identify discharge learning needs (meds, wound care, etc)  - Arrange for interpreters to assist at discharge as needed  - Consider post-discharge preferences of patient/family/discharge partner  - Complete POLST form as ruperto

## 2021-04-13 NOTE — PROGRESS NOTES
[facility]  Progress Note    Marlene Lee Patient Status:  Inpatient    1965 MRN [id number]   [facility] 3NW-A Attending Verena Ingram MD   Hosp Day # 7 PCP Marisue Felty, MD     CC: Abdominal pain, surgical drain output normal      Labs:   Lab Results   Component Value Date    MG 1.7 04/13/2021       Imaging:    CT ABDOMEN+PELVIS (NOS=33859)       COMPARISON:  [facility] , CT, CT ABDOMEN PELVIS IV CONTRAST, NO ORAL (ER), 4/06/2021, 0:58 AM.       INDICATIONS:  Increased drain the posterior right pararenal space up to the level the diaphragm.  There is some inferior extension centrally and to the left of midline which extends into the superior   midline pelvic collection which also contains a drainage catheter.  The midline pelv portion of the duodenum, right pericolic gutter, posterior right pararenal space extending cephalad to the diaphragm and inferiorly in the right pericolic gutter to the mid pelvis there is also some nodularity to this right pericolic   extension. Leno Astorga is [facility] & longterm        ASSESSMENT / PLAN:     1. Intra-abdominal abscess  1. Seen by ID and surgeon  2. On antibiotics Levaquin and Flagyl per ID  3. No surgical interventions planned at this point per surgeon  4. On regular diet per surgeon  5. Patient afebrile  6.  [place] Necessary to TCC on discharge?: yes  Estimated date of discharge: 1 to 2 days  Discharge is dependent on: Clinical progress, ID and surgery clearance, pain controlled on oral / topical pain medications, orthostatics improves and replace electrolytes  At this point M

## 2021-04-13 NOTE — PLAN OF CARE
Patient A&Ox4, VSS. Tele NSR. IVF continued. Pain control as scheduled. Tolerating diet. EDYTA drains with milky output. Ileostomy intact. Contact isolation maintained. Questions and concerns addressed, none further at this time.    2:30am oxycodone not admini schedule  Outcome: Progressing     Problem: DISCHARGE PLANNING  Goal: Discharge to home or other facility with appropriate resources  Description: INTERVENTIONS:  - Identify barriers to discharge w/pt and caregiver  - Include patient/family/discharge partn

## 2021-04-13 NOTE — PROGRESS NOTES
[facility]  Progress Note    Alanis Rod Patient Status:  Inpatient    1965 MRN [id number]   [facility] 3NW-A Attending Adilia Rothman MD   Hosp Day # 7 PCP Zion Lang MD     Subjective: The patient is resting in bed. unspecified erectile dysfunction type     Right low back pain     Diverticulosis of large intestine without perforation or abscess without bleeding     Second degree hemorrhoids     Acute gastritis     Bursitis of left shoulder     Biceps tendonitis, left

## 2021-04-13 NOTE — CM/SW NOTE
MSW, Charge and RN discussed patient's post d/c needs in care rounds. Barrier to dc: Insurance auth for [facility]  Pain    DC plan is [employer] and they have insurance auth.  RN updated on insurance auth at 2:40pm  MSW also called wife and updated her on auth a

## 2021-04-13 NOTE — PROGRESS NOTES
Acute Pain Service     Patient had severe pain early this am - declined 2am dose of OxyER - he states he was half asleep with nurse woke him and he was ok at the time then woke in severe pain with nausea.  He has required 3 doses IV Dilaudid 1mg today (8a,

## 2021-04-14 ENCOUNTER — TELEPHONE (OUTPATIENT)
Dept: SURGERY | Facility: CLINIC | Age: 56
End: 2021-04-14

## 2021-04-14 ENCOUNTER — APPOINTMENT (OUTPATIENT)
Dept: GENERAL RADIOLOGY | Facility: HOSPITAL | Age: 56
DRG: 373 | End: 2021-04-14
Attending: HOSPITALIST
Payer: COMMERCIAL

## 2021-04-14 VITALS
OXYGEN SATURATION: 100 % | HEART RATE: 118 BPM | DIASTOLIC BLOOD PRESSURE: 74 MMHG | WEIGHT: 163.38 LBS | RESPIRATION RATE: 16 BRPM | SYSTOLIC BLOOD PRESSURE: 102 MMHG | BODY MASS INDEX: 25 KG/M2 | TEMPERATURE: 98 F

## 2021-04-14 PROCEDURE — 99239 HOSP IP/OBS DSCHRG MGMT >30: CPT | Performed by: HOSPITALIST

## 2021-04-14 PROCEDURE — 71045 X-RAY EXAM CHEST 1 VIEW: CPT | Performed by: HOSPITALIST

## 2021-04-14 RX ORDER — CELECOXIB 100 MG/1
100 CAPSULE ORAL 2 TIMES DAILY
Qty: 14 CAPSULE | Refills: 0 | Status: SHIPPED | OUTPATIENT
Start: 2021-04-14

## 2021-04-14 RX ORDER — OXYCODONE HYDROCHLORIDE 20 MG/1
20 TABLET ORAL EVERY 4 HOURS
Qty: 18 TABLET | Refills: 0 | Status: ON HOLD | OUTPATIENT
Start: 2021-04-14 | End: 2021-05-04

## 2021-04-14 RX ORDER — MAGNESIUM OXIDE 400 MG (241.3 MG MAGNESIUM) TABLET
400 TABLET DAILY
Qty: 30 TABLET | Refills: 0 | Status: SHIPPED | OUTPATIENT
Start: 2021-04-14 | End: 2021-04-30

## 2021-04-14 RX ORDER — OXYCODONE HCL 20 MG/1
20 TABLET, FILM COATED, EXTENDED RELEASE ORAL 3 TIMES DAILY
Qty: 9 TABLET | Refills: 0 | Status: ON HOLD | OUTPATIENT
Start: 2021-04-14 | End: 2021-05-04

## 2021-04-14 RX ORDER — CELECOXIB 100 MG/1
100 CAPSULE ORAL 2 TIMES DAILY
Status: DISCONTINUED | OUTPATIENT
Start: 2021-04-14 | End: 2021-04-14

## 2021-04-14 RX ORDER — ONDANSETRON 4 MG/1
4 TABLET, FILM COATED ORAL EVERY 4 HOURS
Qty: 18 TABLET | Refills: 0 | Status: SHIPPED | OUTPATIENT
Start: 2021-04-14 | End: 2021-04-17

## 2021-04-14 NOTE — PAYOR COMM NOTE
--------------  CONTINUED STAY REVIEW    Payor: [employer]AD KLINE  Subscriber #:  [id number]  Authorization Number: [id number]    Admit date: 4/6/21  Admit time:  3:18 AM    FAXING CLINICAL UPDATE FOR 4/14/21 4/14/21     CC:  Abdominal pain, surgical drain outp meds  2. Sarcoidosis  1. Patient on Decadron for this  2. Patient follows up  at [facility]  3. GERD  1. On PPI  4.  History of C. difficile with toxic megacolon with perforated colon /bowel perforation with previous colectomy and ileostomy on 3/7/2021 by inhaler 1 puff     Date Action Dose Route User    4/14/2021 0933 Given 1 puff Inhalation Rocío Johnson, BRENDA      HYDROmorphone HCl (DILAUDID) 1 MG/ML injection 1 mg     Date Action Dose Route User    4/13/2021 1420 Given 1 mg Intravenous Rocío Johnson RN RN    4/13/2021 1604 Given 20 mg Oral Gillermina BRENDA Willett      Pantoprazole Sodium (PROTONIX) EC tab 40 mg     Date Action Dose Route User    4/14/2021 0645 Given 40 mg Oral Mearl BRENDA Clark      0.9% NaCl infusion     Date Action Dose Route User    4/14/2

## 2021-04-14 NOTE — PROGRESS NOTES
[facility]  Progress Note    Bobbi Comp Patient Status:  Inpatient    1965 MRN [id number]   [facility] 3NW-A Attending Randall Aschoff, MD   Hosp Day # 8 PCP April Toussaint MD     Subjective: The patient is resting in bed. intestine without perforation or abscess without bleeding     Second degree hemorrhoids     Acute gastritis     Bursitis of left shoulder     Biceps tendonitis, left     Rotator cuff tendonitis, left     Eye muscle twitches     Acute nonintractable headach and plan and again have modified the report to reflect my opinion. Patient continues to to complain of abdominal pain. Working with pain service to optimize treatment. Abdomen soft. Nondistended. Mild tenderness. Incision healing well.   Drains wi

## 2021-04-14 NOTE — PLAN OF CARE
Patient A/O X 4, VSS, IVF infusing. Patient tolerating diet. EDYTA x 3 draining milky drainage. Colostomy draining brown liquid stool. Old midline incision healing, CDI. Patient medicated with scheduled pain medication and scheduled zofran.      Problem: PAIN with appropriate resources  Description: INTERVENTIONS:  - Identify barriers to discharge w/pt and caregiver  - Include patient/family/discharge partner in discharge planning  - Arrange for needed discharge resources and transportation as appropriate  - Id

## 2021-04-14 NOTE — PROGRESS NOTES
NURSING DISCHARGE NOTE    Discharged Rehab facility via Med car. Accompanied by Support staff  Belongings Taken by patient/family with Med car.

## 2021-04-14 NOTE — CM/SW NOTE
[facility] [address]  Phone: [phone]  DC time: 5:30pm  Medicar    MSW met with pt and his dtr at bedside they are agreeable to use of medicar and aware  It's private pay.     MSW spoke to [facility] in [id number] [place] [address] at [facility]

## 2021-04-14 NOTE — PROGRESS NOTES
[facility]  Progress Note    Tylerus Moore Patient Status:  Inpatient    1965 MRN [id number]   [facility] 3NW-A Attending Mony Montero MD   [facility] Day # 8 PCP Noble Chavarria MD     CC: Abdominal pain, surgical drain output 04/14/2021     04/14/2021    CO2 25.0 04/14/2021    GLU 79 04/14/2021    CA 7.7 04/14/2021    MG 1.5 04/14/2021       Imaging:    CT ABDOMEN+PELVIS (CPT=74176)       COMPARISON:  [facility] , CT, CT ABDOMEN PELVIS IV CONTRAST, NO ORAL (ER), 4/06/2021, 0: right pericolic gutter and superiorly into the posterior right pararenal space up to the level the diaphragm.  There is some inferior extension centrally and to the left of midline which extends into the superior   midline pelvic collection which also cont vein adjacent to the   pancreas and 3rd portion of the duodenum, right pericolic gutter, posterior right pararenal space extending cephalad to the diaphragm and inferiorly in the right pericolic gutter to the mid pelvis there is also some nodularity to [facility] Daily  metRONIDAZOLE (FLAGYL) tab 500 mg, 500 mg, Oral, Q8H [facility]        ASSESSMENT / PLAN:     1. Intra-abdominal abscess  1. Seen by ID and surgeon  2. On antibiotics Levaquin and Flagyl per ID  3.  No surgical interventions planned at this point per surgeo

## 2021-04-14 NOTE — PLAN OF CARE
A&Ox4. VSS. RA. . Telemetry: NSR--tachy with activity. GI: Abdomen soft, nondistended. Passing gas via ostomy bag. Denies nausea. : Voids.   Pain controlled with PRN and scheduled pain medications  Up with standby assist.  Drains: EDYTA x3 to bulb suc assessment  - Modify environment to reduce risk of injury  - Provide assistive devices as appropriate  - Consider OT/PT consult to assist with strengthening/mobility  - Encourage toileting schedule  Outcome: Progressing     Problem: [address]

## 2021-04-14 NOTE — PROGRESS NOTES
Acute Pain Service     Patient reports pain still varies from 6-8/10 across lower abdomen but states he is comfortable at present. He is concerned about going to rehab - has a productive cough and states he did not sleep well.      Current medications:   Ac

## 2021-04-15 ENCOUNTER — EXTERNAL FACILITY (OUTPATIENT)
Dept: FAMILY MEDICINE CLINIC | Facility: CLINIC | Age: 56
End: 2021-04-15

## 2021-04-15 DIAGNOSIS — A04.72 TOXIC MEGACOLON DUE TO CLOSTRIDIUM DIFFICILE: ICD-10-CM

## 2021-04-15 DIAGNOSIS — K63.1 BOWEL PERFORATION (HCC): ICD-10-CM

## 2021-04-15 DIAGNOSIS — D86.9 SARCOIDOSIS: ICD-10-CM

## 2021-04-15 DIAGNOSIS — K65.1 LEFT UPPER QUADRANT ABDOMINAL ABSCESS (HCC): ICD-10-CM

## 2021-04-15 DIAGNOSIS — Z93.2 ILEOSTOMY IN PLACE (HCC): ICD-10-CM

## 2021-04-15 DIAGNOSIS — R18.8 INTRAABDOMINAL FLUID COLLECTION: Primary | ICD-10-CM

## 2021-04-15 PROCEDURE — 99306 1ST NF CARE HIGH MDM 50: CPT | Performed by: FAMILY MEDICINE

## 2021-04-15 NOTE — DISCHARGE SUMMARY
[facility]ITAL[facility]  DISCHARGE SUMMARY     Edna Del Toro Patient Status:  Inpatient    1965 MRN [id number]   [facility] 3NW-A Attending No att. providers found   [facility] Day # 8 PCP Levy Madrigal MD     Date of Admission: 2021  D Medium Risk  0-28   Low Risk         TCM Follow-Up Recommendation:  LACE > 58:  High Risk of readmission after discharge from the hospital.    Procedures during hospitalization:   • None    Incidental or significant findings and recommendations (brief descr daily.   Quantity: 3 Package  Refills: 3     gabapentin 100 MG Caps  Commonly known as: NEURONTIN      Take 1 capsule (100 mg total) by mouth 3 (three) times daily.    Quantity: 90 capsule  Refills: 0     levofloxacin 500 MG Tabs  Commonly known as: [employer] [place] [id number]  [phone]    In 1 week      Appointments for Next 30 Days 4/15/2021 - 5/15/2021 Comment    None          Vital signs:  Pulse:  [118] 118    Physical Exam:    General: No acute distress.    Respiratory: Clear to auscultation b

## 2021-04-16 ENCOUNTER — MOBILE ENCOUNTER (OUTPATIENT)
Dept: FAMILY MEDICINE CLINIC | Facility: CLINIC | Age: 56
End: 2021-04-16

## 2021-04-16 DIAGNOSIS — M25.50 ARTHRALGIA, UNSPECIFIED JOINT: ICD-10-CM

## 2021-04-16 DIAGNOSIS — K65.1 LEFT UPPER QUADRANT ABDOMINAL ABSCESS (HCC): ICD-10-CM

## 2021-04-16 DIAGNOSIS — K21.9 GASTROESOPHAGEAL REFLUX DISEASE WITHOUT ESOPHAGITIS: ICD-10-CM

## 2021-04-16 DIAGNOSIS — D72.829 LEUKOCYTOSIS, UNSPECIFIED TYPE: ICD-10-CM

## 2021-04-16 DIAGNOSIS — A04.72 TOXIC MEGACOLON DUE TO CLOSTRIDIUM DIFFICILE: ICD-10-CM

## 2021-04-16 DIAGNOSIS — D86.9 SARCOIDOSIS: ICD-10-CM

## 2021-04-16 DIAGNOSIS — Z93.2 ILEOSTOMY IN PLACE (HCC): ICD-10-CM

## 2021-04-16 DIAGNOSIS — A49.8 CLOSTRIDIUM DIFFICILE INFECTION: ICD-10-CM

## 2021-04-16 DIAGNOSIS — K63.1 BOWEL PERFORATION (HCC): ICD-10-CM

## 2021-04-16 DIAGNOSIS — R18.8 INTRAABDOMINAL FLUID COLLECTION: Primary | ICD-10-CM

## 2021-04-16 PROCEDURE — 99358 PROLONG SERVICE W/O CONTACT: CPT | Performed by: FAMILY MEDICINE

## 2021-04-16 NOTE — PROGRESS NOTES
Kimmy Pack Author: Jdui Ellington MD     1965 MRN [id number]   [facility]  Admission 21      Last Hospital Discharge 21 PCP Bj Schafer 15 of Discharge  [facility]        CC --admitted to [address] [place] [address] sub Procedure Laterality Date   • BIOPSY/REMOVAL, LYMPH NODE(S) Bilateral 2013    Lymph node removal (neck)   • BRONCHOSCOPY N/A 2/16/2021    Performed by Sarah Nguyen MD at [address] [facility] N/A 6/1/2020    Performed Comment:generalized    CODE STATUS:  Full Code    CURRENT MEDICATIONS   Current Outpatient Medications   Medication Sig Dispense Refill   • oxyCODONE HCl ER 20 MG Oral Tablet Extended Release 12 hour Abuse-Deterrent Take 1 tablet (20 mg total) by mouth 3 ( No mouth pain, neck pain, running nose, headaches or swollen glands. Skin: No rashes, pruritus or skin changes,  Respiratory: Denies cough, wheezing or shortness of breath. CV: Denies chest pain, palpitations, orthopnea, PND or dizziness.   Marie Bliss gutter, posterior right pararenal space extending cephalad to the diaphragm and inferiorly in the right pericolic gutter to the mid pelvis there is also some nodularity to this right pericolic   extension.  There is inferior extension into a lobulated cent

## 2021-04-17 NOTE — PROGRESS NOTES
Destin Snell Author: Michelle Leal MD     1965 MRN [id number]   [facility]  Admission 21      Last Hospital Discharge 21 PCP Gerhardt Guile, [facility]  [facility]       Patient admitted to [address] [place] [address]

## 2021-04-19 ENCOUNTER — INITIAL APN SNF VISIT (OUTPATIENT)
Dept: INTERNAL MEDICINE CLINIC | Age: 56
End: 2021-04-19

## 2021-04-19 VITALS
OXYGEN SATURATION: 97 % | TEMPERATURE: 96 F | HEART RATE: 86 BPM | RESPIRATION RATE: 18 BRPM | DIASTOLIC BLOOD PRESSURE: 70 MMHG | SYSTOLIC BLOOD PRESSURE: 119 MMHG

## 2021-04-19 VITALS
HEART RATE: 87 BPM | TEMPERATURE: 98 F | OXYGEN SATURATION: 98 % | SYSTOLIC BLOOD PRESSURE: 116 MMHG | RESPIRATION RATE: 18 BRPM | DIASTOLIC BLOOD PRESSURE: 79 MMHG

## 2021-04-19 DIAGNOSIS — Z76.0 PRESCRIPTION REFILL: ICD-10-CM

## 2021-04-19 DIAGNOSIS — D86.9 SARCOIDOSIS: ICD-10-CM

## 2021-04-19 DIAGNOSIS — Z79.899 MEDICATION MANAGEMENT: ICD-10-CM

## 2021-04-19 DIAGNOSIS — Z78.9 DECREASED ACTIVITIES OF DAILY LIVING (ADL): Primary | ICD-10-CM

## 2021-04-19 DIAGNOSIS — Z93.2 PRESENCE OF ILEOSTOMY (HCC): ICD-10-CM

## 2021-04-19 PROCEDURE — 3074F SYST BP LT 130 MM HG: CPT | Performed by: NURSE PRACTITIONER

## 2021-04-19 PROCEDURE — 99310 SBSQ NF CARE HIGH MDM 45: CPT | Performed by: NURSE PRACTITIONER

## 2021-04-19 PROCEDURE — 3078F DIAST BP <80 MM HG: CPT | Performed by: NURSE PRACTITIONER

## 2021-04-19 NOTE — PROGRESS NOTES
Tong Lukas  : 1965  Age 54year old  male patient is admitted to Facility:  [address] date:  21  Discharge date to PARM[facility]IT:  21  Anticipated discharge date:  TBD; as per Insurance  [employer] Diagnoses:  Intra-abdominal abscess  Sarcoidosis  GERD  History of C. difficile with toxic megacolon requiring colectomy and ileostomy  Hypomagnesemia  Orthostatic hypotension    Patient seen in follow-up related to hospitalization, in subacute rehab, charles Mother    • Heart Disorder Mother    • Other (Other) Mother         cva   • Diabetes Sister      Social History    Tobacco Use      Smoking status: Current Every Day Smoker        Packs/day: 0.25        Years: 8.00        Pack years: 2        Types: General Plain mouth daily. • Omeprazole 40 MG Oral Capsule Delayed Release Take 40 mg by mouth daily. • fluticasone-salmeterol (ADVAIR DISKUS) 250-50 MCG/DOSE Inhalation Aerosol Powder, Breath Activated Inhale 1 puff into the lungs 2 (two) times daily.  3 Packa no guarding  :Deferred  MUSCULOSKELETAL: weakness r/t recent hospitalization/diagnoses/sequelae; will undergo therapies to rehab and improve strength, endurance and independence with ADLs  EXTREMITIES/VASCULAR:no cyanosis, clubbing or edema  NEUROLOGIC: evaluate and treat  [facility] team to establish care plan meeting with patient and POA/family as appropriate  PARM[facility]IT team/ & discharge planner to assist with establishing safe discharge plan for next level of care     DVT Prophylaxis: encourage exercis requested     BPH  Flomax 0.4 mg p.o. nightly; end date 5/1/2021     GI Proph  Omeprazole 40 mg p.o. daily     Vitamins/supplements as r/t deficiencies  Vitamin C 500 mg p.o. daily     Follow-ups after [facility]IT discharge:   Follow-Up with PCP within 1-2 weeks fo

## 2021-04-19 NOTE — PROGRESS NOTES
Tong Lukas  : 1965  Age 54year old  male patient is admitted to Facility: [facility] date: 2/10/2021  Discharge date to [facility]: 2021  ELOS: 14 to 18 days  Anticipated discharge date:  To be determined; encasing small bowel, 2.8 cm mass head of pancreas. Patient on empiric Zosyn. GI evaluated and MRI showed inflammatory changes adjacent to the pancreatic head with spiculated region possible mass. Surgical oncology evaluated also.   Patient had elevated cleared by all consultants. He was instructed to follow-up PCP, general surgery, GI.\"    Hospital Discharge Diagnoses:  1. C Diff megacolon complicated by bowel perforation  1. S/P colectomy, ileostomy 3/7/2021 via Gen Sx  2.  Has drain remaining from OR • Stroke Father    • Other (Other) Father         cva   • Diabetes Mother    • Heart Disorder Mother    • Other (Other) Mother         cva   • Diabetes Sister      Social History    Tobacco Use      Smoking status: Current Every Day Smoker        Packs/d Ascorbic Acid (VITAMIN C-GLADYS HIPS) 500 MG Oral Tab Take 500 mg by mouth daily. • Omeprazole 40 MG Oral Capsule Delayed Release Take 40 mg by mouth daily.        • fluticasone-salmeterol (ADVAIR DISKUS) 250-50 MCG/DOSE Inhalation Aerosol Powder, Breath and improve strength, endurance and independence with ADLs  EXTREMITIES/VASCULAR:no cyanosis, clubbing or edema  NEUROLOGIC: follows commands  PSYCHIATRIC: ---alert and oriented x 4; distressed and tearful about circumstances/recent hospitalization; declin establish care plan meeting with patient and POA/family as appropriate  PARM[facility] team/ & discharge planner to assist with establishing safe discharge plan for next level of care     DVT Prophylaxis: encourage exercise and participation with therapy Alanna Paez, [address]  [address], [address] [facility]  [phone]    Follow-Up with specialists as recommended.     Milinda Gaucher, MD  [address]  [address] [phone]    This dictati

## 2021-04-22 ENCOUNTER — SNF VISIT (OUTPATIENT)
Dept: INTERNAL MEDICINE CLINIC | Age: 56
End: 2021-04-22

## 2021-04-22 DIAGNOSIS — Z74.1 REQUIRES ASSISTANCE WITH ACTIVITIES OF DAILY LIVING (ADL): Primary | ICD-10-CM

## 2021-04-22 DIAGNOSIS — D72.829 LEUKOCYTOSIS, UNSPECIFIED TYPE: ICD-10-CM

## 2021-04-22 DIAGNOSIS — Z93.2 PRESENCE OF ILEOSTOMY (HCC): ICD-10-CM

## 2021-04-22 DIAGNOSIS — Z79.899 MEDICATION MANAGEMENT: ICD-10-CM

## 2021-04-22 DIAGNOSIS — D72.819 LEUKOPENIA, UNSPECIFIED TYPE: ICD-10-CM

## 2021-04-22 DIAGNOSIS — E83.42 HYPOMAGNESEMIA: ICD-10-CM

## 2021-04-22 PROCEDURE — 3074F SYST BP LT 130 MM HG: CPT | Performed by: NURSE PRACTITIONER

## 2021-04-22 PROCEDURE — 3078F DIAST BP <80 MM HG: CPT | Performed by: NURSE PRACTITIONER

## 2021-04-22 PROCEDURE — 99309 SBSQ NF CARE MODERATE MDM 30: CPT | Performed by: NURSE PRACTITIONER

## 2021-04-26 ENCOUNTER — TELEPHONE (OUTPATIENT)
Dept: SURGERY | Facility: CLINIC | Age: 56
End: 2021-04-26

## 2021-04-26 ENCOUNTER — SNF VISIT (OUTPATIENT)
Dept: INTERNAL MEDICINE CLINIC | Age: 56
End: 2021-04-26

## 2021-04-26 DIAGNOSIS — D72.829 LEUKOCYTOSIS, UNSPECIFIED TYPE: Primary | ICD-10-CM

## 2021-04-26 DIAGNOSIS — Z74.1 REQUIRES ASSISTANCE WITH ACTIVITIES OF DAILY LIVING (ADL): ICD-10-CM

## 2021-04-26 DIAGNOSIS — Z79.899 MEDICATION MANAGEMENT: ICD-10-CM

## 2021-04-26 DIAGNOSIS — Z93.2 PRESENCE OF ILEOSTOMY (HCC): ICD-10-CM

## 2021-04-26 PROCEDURE — 99308 SBSQ NF CARE LOW MDM 20: CPT | Performed by: NURSE PRACTITIONER

## 2021-04-26 PROCEDURE — 3078F DIAST BP <80 MM HG: CPT | Performed by: NURSE PRACTITIONER

## 2021-04-26 PROCEDURE — 3074F SYST BP LT 130 MM HG: CPT | Performed by: NURSE PRACTITIONER

## 2021-04-26 NOTE — TELEPHONE ENCOUNTER
Dany Ryan from [facility] [address] contacted the office to inform [facility] about the recent blood count. Dany Ryan informed me that PT.'s WBC count was elevated and the levels were 15.88 over the weekend.  Dany Ryan informed me that the NP at the [address] [place] [address] place is richard

## 2021-04-27 ENCOUNTER — TELEPHONE (OUTPATIENT)
Dept: SURGERY | Facility: CLINIC | Age: 56
End: 2021-04-27

## 2021-04-27 NOTE — TELEPHONE ENCOUNTER
Pt is at [employer], RN states WBC is now at 18.71 up from 15.88 on 4/24. Pt is getting bactrim DS every day. Rn denies fever, states ileostomy functioning. Msg to PAs.  PA's state to notify the rehab that patient needs to be evaluated by ID for antibi

## 2021-04-29 ENCOUNTER — SNF DISCHARGE (OUTPATIENT)
Dept: INTERNAL MEDICINE CLINIC | Age: 56
End: 2021-04-29

## 2021-04-29 ENCOUNTER — MOBILE ENCOUNTER (OUTPATIENT)
Dept: SURGERY | Facility: CLINIC | Age: 56
End: 2021-04-29

## 2021-04-29 VITALS
DIASTOLIC BLOOD PRESSURE: 65 MMHG | RESPIRATION RATE: 16 BRPM | OXYGEN SATURATION: 96 % | HEART RATE: 100 BPM | TEMPERATURE: 97 F | SYSTOLIC BLOOD PRESSURE: 101 MMHG

## 2021-04-29 VITALS
HEART RATE: 103 BPM | SYSTOLIC BLOOD PRESSURE: 118 MMHG | RESPIRATION RATE: 20 BRPM | DIASTOLIC BLOOD PRESSURE: 82 MMHG | TEMPERATURE: 97 F | OXYGEN SATURATION: 98 %

## 2021-04-29 VITALS
RESPIRATION RATE: 22 BRPM | HEART RATE: 98 BPM | SYSTOLIC BLOOD PRESSURE: 125 MMHG | OXYGEN SATURATION: 95 % | DIASTOLIC BLOOD PRESSURE: 61 MMHG | TEMPERATURE: 97 F

## 2021-04-29 DIAGNOSIS — D72.829 LEUKOCYTOSIS, UNSPECIFIED TYPE: Primary | ICD-10-CM

## 2021-04-29 DIAGNOSIS — Z93.2 PRESENCE OF ILEOSTOMY (HCC): ICD-10-CM

## 2021-04-29 DIAGNOSIS — D86.9 SARCOIDOSIS: ICD-10-CM

## 2021-04-29 DIAGNOSIS — Z76.0 PRESCRIPTION REFILL: ICD-10-CM

## 2021-04-29 DIAGNOSIS — Z74.1 REQUIRES ASSISTANCE WITH ACTIVITIES OF DAILY LIVING (ADL): ICD-10-CM

## 2021-04-29 PROBLEM — D72.819 LEUKOPENIA: Status: ACTIVE | Noted: 2021-04-29

## 2021-04-29 PROCEDURE — 3079F DIAST BP 80-89 MM HG: CPT | Performed by: NURSE PRACTITIONER

## 2021-04-29 PROCEDURE — 99310 SBSQ NF CARE HIGH MDM 45: CPT | Performed by: NURSE PRACTITIONER

## 2021-04-29 PROCEDURE — 3074F SYST BP LT 130 MM HG: CPT | Performed by: NURSE PRACTITIONER

## 2021-04-29 NOTE — PROGRESS NOTES
Martha Brito, 11/16/1965, 54year old, male    Chief Complaint:  Patient presents with:   Follow - Up  Lab Results  Weakness  [address] date:  4/5/21  Discharge date to [facility]:  4/14/21  Anticipated discharge date:  4/28; or as p team updated and will continue therapy modalities as tolerated, patient teaching on self care and med management. No further questions/concerns per patient/staff or nurse Albert Reyes. Verified call light is within reach.     Objective:  /65   Pulse 100   T -  NEGATIVE  Final  BILIRUBIN, SEMI-QUANT NEGATIVE NEGATIVE -  NEGATIVE  Final  BLOOD, SEMI-QUANT.  NEGATIVE NEGATIVE -  NEGATIVE  Final  NITRITE, SEMI-QUANT NEGATIVE NEGATIVE -  NEGATIVE  Final  UROBILINOGEN, SEMI-QT 1.0 EU/dL 0.2 - 1.0 Final  LEUKOCYTES, teaching re: ostomy self-care  --Follow-up with Gen Surg, Dr. Genia Moralez; CT as per Gen Surg 5/6/21  **Teach patient how to empty the drains, maintain them and identify s/s infection     Leukocytosis, afebrile  4/22:  WBC 13.1; CXR and U/A ordered (negative x TID x 5 days     Follow-ups after [facility] discharge:   Follow-Up with PCP within 1-2 weeks following PARMJIT discharge.      Cortes Dykes MD  [address] [place], [place] [id number] [phone]     Follow-Up with specialists as recom

## 2021-04-29 NOTE — PROGRESS NOTES
Brianna Ramirez, 11/16/1965, 54year old, male    Chief Complaint:  Patient presents with:   Follow - Up  Lab Results  Weakness  Wound Recheck  Medication Follow-Up     [facility]mit date:  4/5/21  Discharge date to [facility]:  4/14/21  Anticipated disch walker. No further questions/concerns per patient/staff or nurse Jocy Shirley. Verified call light is within reach.     Objective:  /61   Pulse 98   Temp 97 °F (36.1 °C)   Resp 22   SpO2 95%   PHYSICAL EXAM:  GENERAL HEALTH: well developed, in no ruperto mass with necrosis and abscess, status post 2 drains by IR 3/25  --Flagyl 500 mg p.o. 3 times daily for 2 wks, END 4/15/2021-COMPLETED  --Levaquin 500 mg p.o. daily for 2 wks, END 4/16/2021--COMPLETED  --SNF wound RN to follow wounds and teaching re: rayray Calcium Carbonate 500mg po TID x 5 days     Follow-ups after PARM[facility]IT discharge:   Follow-Up with PCP within 1-2 weeks following PARMJIT discharge.      Elisabeth Herzog MD  [address] [place] [address]  [address], [address] [facility]  [phone]     Follow-U

## 2021-04-29 NOTE — PROGRESS NOTES
Bobbi Ramirez, 11/16/1965, 54year old, male is being discharged from Facility:  [facility]    Date of Admission: 4/14/21  Anticipated Date of Discharge: 4/30/21; extended to repeat labs, get CT abd and determine plan of care abdomen, 3 EDYTA drains--draining cloudy drainage-normal in appearance per patient; no increase in output per RN  SKIN: no rashes, no suspicious lesions  WOUND: abdomen; no s/s infection  RESPIRATORY:---coarse, no wheezing, no acc muscle use, on room air; tristen and U/A ordered (negative x 2), increased Bactrim to daily x 1 wk, monitor patient, vitals and labs closely to trend. ID APN at SNF following as well  4/23: U/A unremarkable; noncontributory  4/24: WBC 15.8; not toxic appearing.  Continue to monitor; notif ER; 20mg tab po TID; DO NOT HOLD UNLESS SEDATED  Oxycodone immediate release 20 mg p.o. every 4 hours; HOLD if   *Rx refills provided as needed per Bud Palmer RN  Narcan 4 mg nasally as needed if patient is unresponsive, may repeat dose in other nostril 2

## 2021-04-30 ENCOUNTER — APPOINTMENT (OUTPATIENT)
Dept: CT IMAGING | Facility: HOSPITAL | Age: 56
DRG: 871 | End: 2021-04-30
Attending: EMERGENCY MEDICINE
Payer: COMMERCIAL

## 2021-04-30 ENCOUNTER — HOSPITAL ENCOUNTER (INPATIENT)
Facility: HOSPITAL | Age: 56
LOS: 6 days | Discharge: ANOTHER HOSPITAL | DRG: 871 | End: 2021-05-06
Attending: EMERGENCY MEDICINE | Admitting: HOSPITALIST
Payer: COMMERCIAL

## 2021-04-30 ENCOUNTER — APPOINTMENT (OUTPATIENT)
Dept: GENERAL RADIOLOGY | Facility: HOSPITAL | Age: 56
DRG: 871 | End: 2021-04-30
Attending: EMERGENCY MEDICINE
Payer: COMMERCIAL

## 2021-04-30 ENCOUNTER — TELEPHONE (OUTPATIENT)
Dept: FAMILY MEDICINE CLINIC | Facility: CLINIC | Age: 56
End: 2021-04-30

## 2021-04-30 ENCOUNTER — TELEPHONE (OUTPATIENT)
Dept: INTERNAL MEDICINE CLINIC | Age: 56
End: 2021-04-30

## 2021-04-30 DIAGNOSIS — A41.9 SEPSIS WITH HYPOTENSION (HCC): Primary | ICD-10-CM

## 2021-04-30 DIAGNOSIS — I95.9 SEPSIS WITH HYPOTENSION (HCC): Primary | ICD-10-CM

## 2021-04-30 PROBLEM — D64.9 ANEMIA: Status: ACTIVE | Noted: 2021-04-30

## 2021-04-30 PROCEDURE — 99223 1ST HOSP IP/OBS HIGH 75: CPT | Performed by: SURGERY

## 2021-04-30 PROCEDURE — 71045 X-RAY EXAM CHEST 1 VIEW: CPT | Performed by: EMERGENCY MEDICINE

## 2021-04-30 PROCEDURE — 99223 1ST HOSP IP/OBS HIGH 75: CPT | Performed by: HOSPITALIST

## 2021-04-30 PROCEDURE — 74177 CT ABD & PELVIS W/CONTRAST: CPT | Performed by: EMERGENCY MEDICINE

## 2021-04-30 RX ORDER — FLUTICASONE FUROATE AND VILANTEROL TRIFENATATE 100; 25 UG/1; UG/1
1 POWDER RESPIRATORY (INHALATION) 2 TIMES DAILY
COMMUNITY

## 2021-04-30 RX ORDER — OXYCODONE HCL 10 MG/1
20 TABLET, FILM COATED, EXTENDED RELEASE ORAL 3 TIMES DAILY
Status: DISCONTINUED | OUTPATIENT
Start: 2021-04-30 | End: 2021-05-06

## 2021-04-30 RX ORDER — HYDROMORPHONE HYDROCHLORIDE 1 MG/ML
0.5 INJECTION, SOLUTION INTRAMUSCULAR; INTRAVENOUS; SUBCUTANEOUS EVERY 30 MIN PRN
Status: COMPLETED | OUTPATIENT
Start: 2021-04-30 | End: 2021-05-06

## 2021-04-30 RX ORDER — HEPARIN SODIUM 5000 [USP'U]/ML
5000 INJECTION, SOLUTION INTRAVENOUS; SUBCUTANEOUS EVERY 8 HOURS SCHEDULED
Status: DISCONTINUED | OUTPATIENT
Start: 2021-04-30 | End: 2021-05-07

## 2021-04-30 RX ORDER — MELATONIN
3 NIGHTLY PRN
Status: DISCONTINUED | OUTPATIENT
Start: 2021-04-30 | End: 2021-05-07

## 2021-04-30 RX ORDER — ONDANSETRON 2 MG/ML
4 INJECTION INTRAMUSCULAR; INTRAVENOUS ONCE
Status: DISCONTINUED | OUTPATIENT
Start: 2021-04-30 | End: 2021-05-06 | Stop reason: ALTCHOICE

## 2021-04-30 RX ORDER — MAGNESIUM OXIDE 400 MG (241.3 MG MAGNESIUM) TABLET
400 TABLET 3 TIMES DAILY
COMMUNITY

## 2021-04-30 RX ORDER — HYDROMORPHONE HYDROCHLORIDE 1 MG/ML
0.2 INJECTION, SOLUTION INTRAMUSCULAR; INTRAVENOUS; SUBCUTANEOUS EVERY 2 HOUR PRN
Status: DISCONTINUED | OUTPATIENT
Start: 2021-04-30 | End: 2021-05-06

## 2021-04-30 RX ORDER — HYDROMORPHONE HYDROCHLORIDE 1 MG/ML
0.8 INJECTION, SOLUTION INTRAMUSCULAR; INTRAVENOUS; SUBCUTANEOUS EVERY 2 HOUR PRN
Status: DISCONTINUED | OUTPATIENT
Start: 2021-04-30 | End: 2021-05-06

## 2021-04-30 RX ORDER — MULTIVIT WITH MINERALS/LUTEIN
1000 TABLET ORAL DAILY
COMMUNITY

## 2021-04-30 RX ORDER — TAMSULOSIN HYDROCHLORIDE 0.4 MG/1
0.4 CAPSULE ORAL NIGHTLY
Status: DISCONTINUED | OUTPATIENT
Start: 2021-04-30 | End: 2021-05-07

## 2021-04-30 RX ORDER — ONDANSETRON 2 MG/ML
8 INJECTION INTRAMUSCULAR; INTRAVENOUS EVERY 6 HOURS PRN
Status: DISCONTINUED | OUTPATIENT
Start: 2021-04-30 | End: 2021-05-07

## 2021-04-30 RX ORDER — ACETAMINOPHEN 325 MG/1
650 TABLET ORAL EVERY 6 HOURS PRN
Status: DISCONTINUED | OUTPATIENT
Start: 2021-04-30 | End: 2021-05-06

## 2021-04-30 RX ORDER — DEXAMETHASONE 2 MG/1
2 TABLET ORAL
Status: DISCONTINUED | OUTPATIENT
Start: 2021-05-01 | End: 2021-05-05

## 2021-04-30 RX ORDER — POLYETHYLENE GLYCOL 3350 17 G/17G
17 POWDER, FOR SOLUTION ORAL DAILY PRN
Status: DISCONTINUED | OUTPATIENT
Start: 2021-04-30 | End: 2021-05-07

## 2021-04-30 RX ORDER — PANTOPRAZOLE SODIUM 20 MG/1
20 TABLET, DELAYED RELEASE ORAL
Status: DISCONTINUED | OUTPATIENT
Start: 2021-05-01 | End: 2021-05-07

## 2021-04-30 RX ORDER — HYDROMORPHONE HYDROCHLORIDE 1 MG/ML
0.4 INJECTION, SOLUTION INTRAMUSCULAR; INTRAVENOUS; SUBCUTANEOUS EVERY 2 HOUR PRN
Status: DISCONTINUED | OUTPATIENT
Start: 2021-04-30 | End: 2021-05-06

## 2021-04-30 RX ORDER — CHLORHEXIDINE GLUCONATE 0.12 MG/ML
30 RINSE ORAL 2 TIMES DAILY
COMMUNITY

## 2021-04-30 RX ORDER — PIPERACILLIN SODIUM, TAZOBACTAM SODIUM 3; .375 G/15ML; G/15ML
500 INJECTION, POWDER, LYOPHILIZED, FOR SOLUTION INTRAVENOUS EVERY 6 HOURS
Status: ON HOLD | COMMUNITY
Start: 2021-04-30 | End: 2021-05-04

## 2021-04-30 RX ORDER — ONDANSETRON 4 MG/1
4 TABLET, FILM COATED ORAL EVERY 8 HOURS PRN
COMMUNITY

## 2021-04-30 RX ORDER — SODIUM CHLORIDE 9 MG/ML
INJECTION, SOLUTION INTRAVENOUS CONTINUOUS
Status: DISCONTINUED | OUTPATIENT
Start: 2021-04-30 | End: 2021-04-30

## 2021-04-30 RX ORDER — SODIUM CHLORIDE 9 MG/ML
INJECTION, SOLUTION INTRAVENOUS CONTINUOUS
Status: DISCONTINUED | OUTPATIENT
Start: 2021-04-30 | End: 2021-05-01

## 2021-04-30 RX ORDER — SODIUM CHLORIDE 9 MG/ML
1000 INJECTION, SOLUTION INTRAVENOUS ONCE
Status: COMPLETED | OUTPATIENT
Start: 2021-04-30 | End: 2021-04-30

## 2021-04-30 RX ORDER — TAMSULOSIN HYDROCHLORIDE 0.4 MG/1
0.4 CAPSULE ORAL NIGHTLY
Status: ON HOLD | COMMUNITY
Start: 2021-04-14 | End: 2021-05-04

## 2021-04-30 RX ORDER — GABAPENTIN 100 MG/1
100 CAPSULE ORAL 3 TIMES DAILY
Status: DISCONTINUED | OUTPATIENT
Start: 2021-04-30 | End: 2021-05-07

## 2021-04-30 RX ORDER — SULFAMETHOXAZOLE AND TRIMETHOPRIM 800; 160 MG/1; MG/1
1 TABLET ORAL
Status: DISCONTINUED | OUTPATIENT
Start: 2021-04-30 | End: 2021-05-07

## 2021-04-30 RX ORDER — SULFAMETHOXAZOLE AND TRIMETHOPRIM 800; 160 MG/1; MG/1
1 TABLET ORAL
Status: ON HOLD | COMMUNITY
Start: 2021-04-21 | End: 2021-05-06

## 2021-04-30 RX ORDER — BISACODYL 10 MG
10 SUPPOSITORY, RECTAL RECTAL
Status: DISCONTINUED | OUTPATIENT
Start: 2021-04-30 | End: 2021-05-07

## 2021-04-30 RX ORDER — OXYCODONE HYDROCHLORIDE 10 MG/1
20 TABLET ORAL EVERY 4 HOURS PRN
Status: DISCONTINUED | OUTPATIENT
Start: 2021-04-30 | End: 2021-05-06

## 2021-04-30 NOTE — TELEPHONE ENCOUNTER
Patient will be discharged home from facility. Facility trying to facilitate HFU, rehab follow up with PCP. Next available appointment on week of 5-10, facility requesting sooner appointment if PCP can fit patient in to schedule.  Patient can be reached for

## 2021-04-30 NOTE — H&P
[facility]  History and Physical     Edna Del Toro Patient Status:  Emergency    1965 MRN [id number]   Location [address] [facility] Attending Marcela Renee MD   Hosp Day # 0 PCP Levy Madrigal MD     Chief Compl Onset   • Heart Disorder Father    • Stroke Father    • Other (Other) Father         cva   • Diabetes Mother    • Heart Disorder Mother    • Other (Other) Mother         cva   • Diabetes Sister         Allergies:   Prednisone              HIVES    Comment: Systems:   A comprehensive 14 point review of systems was completed. Pertinent positives and negatives noted in the HPI.     Physical Exam:    BP 98/77   Pulse 84   Temp 97.7 °F (36.5 °C) (Tympanic)   Resp 22   Ht 5' 8\" (1.727 m)   Wt 153 lb (69.4 kg) the last 168 hours. Cardiac  No results for input(s): TROP, PBNP in the last 168 hours. Creatinine Kinase  No results for input(s): CK in the last 168 hours.     Inflammatory Markers  No results for input(s): CRP, THEODORA, LDH, DDIMER in the last 168 hour

## 2021-04-30 NOTE — CONSULTS
[facility]  Report of Consultation    Marlene Lee Patient Status:  Emergency    1965 MRN [id number]   Location [facility] [facility] Attending No att. providers found   Hosp Day # 0 PCP Marisue Felty, MD     Requesting Phy intra-abdominal abscess collections with associated drainage catheters are again seen on all appear relatively stable in size and configuration.     History:  Past Medical History:   Diagnosis Date   • Calculus of kidney    • Chronic cough    • Decorative t Once    Review of Systems:  Review of Systems  Constitutional: No Fever, No Chills, No Diaphoresis, No fatigue, No weight loss, No anorexia  Eyes: No burry vision, No icterus  ENT: No tinnitus, No rhinorrhea, No dysphagia, No odynophagia  Respiratory: No d behavior is normal.        Laboratory Data:  Recent Labs   Lab 04/29/21  1635 04/30/21  1201   RBC 3.57* 3.60*   HGB 10.5* 10.5*   HCT 33.6* 33.2*   MCV 94.1 92.2   MCH 29.4 29.2   MCHC 31.3 31.6   RDW 15.6* 15.2*   NEPRELIM 18.40* 17.94*   WBC 19.9* 19.6* Arthralgia     Body aches     Gastroesophageal reflux disease     NAFLD (nonalcoholic fatty liver disease)     Hypokalemia     Leukocytosis     SALLY (acute kidney injury) ([facility] [address]     Hyponatremia     Adjustment disorder with mixed anxiety and depressed mood his main complaint now is fatigue. He states he has some abdominal discomfort in his lower abdomen but this is not new.   Patient was at a rehab facility and went home on his own accord and then apparently presented to [facility] when he was not feeli

## 2021-04-30 NOTE — TELEPHONE ENCOUNTER
LM informing Joni Abts that we can adjust an appointment time next week for pt but asked to call back as pt is in the ER currently and it is unclear if he will be admitted at this time. Call back number provided.

## 2021-04-30 NOTE — TELEPHONE ENCOUNTER
See TE, would you be able to fit him anywhere before 5/10 or should pt schedule with another provider? Hyun Le has several openings on 5/5.

## 2021-04-30 NOTE — ED PROVIDER NOTES
Patient Seen in: [facility] Emergency Department      History   No chief complaint on file. Stated Complaint: Elevated WBC     HPI/Subjective:   HPI    Patient presents with elevated white blood cell count and abdominal pain.   The patient has bee Packs/day: 0.25        Years: 8.00        Pack years: 2        Types: Cigarettes        Quit date: 2000        Years since quittin.3      Smokeless tobacco: Never Used      Tobacco comment: only socially currently-\"when drinking' -about 10 a week A/G Ratio 0.4 (*)     All other components within normal limits    Narrative:     Called to [id number]   LACTIC ACID, PLASMA - Abnormal; Notable for the following components:    Lactic Acid 2.1 (*)     All other components within normal limits   REDRAW CMP (P) (CPT=71045), 4/14/2021, 1:36 PM.  INDICATIONS:  Elevated WBC  PATIENT STATED HISTORY: (As transcribed by Technologist)  Patient offered no additional history at this time    FINDINGS:  There are surgical sutures seen projecting over the left mid lung field is again evidence of a retroperitoneal abscess which is inferior and posterior to the pancreas, encasing the SMA and SMV, without significant interval change in size or configuration from the previous study. This is best seen on image 44 of series 2.  SPLE all appear relatively stable in size and configuration. No significant interval change or progression of disease is identified.    Dictated by (CST): Autumn Anchors, DO on 4/30/2021 at 2:33 PM     Finalized by (CST): Zanon Anchors, DO on 4/30/2021 at 2:43 PM 1+      Capillary Refill:  <3 Secs    Skin:  Temp/Moisture: Warm and Dry  Color: Normal      Renaye Listen  4/30/2021  3:19 PM    A total of 35 minutes of critical care time (exclusive of billable procedures) was administered to manage the patient's un

## 2021-04-30 NOTE — CONSULTS
Critical Care H&P/Consult     NAME: Destin Snell - ROOM: [facility] - MRN: [id number] - Age: 54year old - :  1965    Date of Admission: 2021 11:38 AM  Admission Diagnosis: No admission diagnoses are documented for this encounter.       Assessme removal (neck)   • COLONOSCOPY     • COLONOSCOPY  11/2015    4 small adeonomas, diverticulosis. repeat 2018   • EGD  11/2015     schatzki's ring, non obstructive. Erosive esophagitis, LA grad 1 (no barretts on path).  Gastritis, no hpylori on path   • NEEDL Gluconate 0.12 % Mouth/Throat Solution, Use as directed 30 mL in the mouth or throat 2 (two) times daily.  For ulcer swish and spit, Disp: , Rfl:   •  Ondansetron HCl (ZOFRAN) 4 mg tablet, Take 4 mg by mouth every 8 (eight) hours as needed for Nausea., Disp (04/30/21 1540)     Review of systems:   12 point ROS otherwise negative unless noted in the HPI    Objective:     Intake/Output Summary (Last 24 hours) at 4/30/2021 1618  Last data filed at 4/30/2021 1540  Gross per 24 hour   Intake 2100 ml   Output —   Ne

## 2021-05-01 ENCOUNTER — APPOINTMENT (OUTPATIENT)
Dept: CT IMAGING | Facility: HOSPITAL | Age: 56
DRG: 871 | End: 2021-05-01
Attending: PHYSICIAN ASSISTANT
Payer: COMMERCIAL

## 2021-05-01 PROCEDURE — 0W9G30Z DRAINAGE OF PERITONEAL CAVITY WITH DRAINAGE DEVICE, PERCUTANEOUS APPROACH: ICD-10-PCS | Performed by: RADIOLOGY

## 2021-05-01 PROCEDURE — 99232 SBSQ HOSP IP/OBS MODERATE 35: CPT | Performed by: HOSPITALIST

## 2021-05-01 PROCEDURE — 49406 IMAGE CATH FLUID PERI/RETRO: CPT | Performed by: PHYSICIAN ASSISTANT

## 2021-05-01 PROCEDURE — 99232 SBSQ HOSP IP/OBS MODERATE 35: CPT | Performed by: SURGERY

## 2021-05-01 RX ORDER — NALOXONE HYDROCHLORIDE 0.4 MG/ML
80 INJECTION, SOLUTION INTRAMUSCULAR; INTRAVENOUS; SUBCUTANEOUS AS NEEDED
Status: DISCONTINUED | OUTPATIENT
Start: 2021-05-01 | End: 2021-05-01 | Stop reason: HOSPADM

## 2021-05-01 RX ORDER — FLUMAZENIL 0.1 MG/ML
0.2 INJECTION, SOLUTION INTRAVENOUS AS NEEDED
Status: DISCONTINUED | OUTPATIENT
Start: 2021-05-01 | End: 2021-05-01 | Stop reason: HOSPADM

## 2021-05-01 RX ORDER — SODIUM CHLORIDE 9 MG/ML
INJECTION, SOLUTION INTRAVENOUS CONTINUOUS
Status: DISCONTINUED | OUTPATIENT
Start: 2021-05-01 | End: 2021-05-01 | Stop reason: HOSPADM

## 2021-05-01 RX ORDER — DEXTROSE MONOHYDRATE 25 G/50ML
INJECTION, SOLUTION INTRAVENOUS
Status: COMPLETED
Start: 2021-05-01 | End: 2021-05-01

## 2021-05-01 RX ORDER — ALBUMIN, HUMAN INJ 5% 5 %
250 SOLUTION INTRAVENOUS ONCE
Status: COMPLETED | OUTPATIENT
Start: 2021-05-01 | End: 2021-05-01

## 2021-05-01 RX ORDER — DEXTROSE AND SODIUM CHLORIDE 5; .9 G/100ML; G/100ML
INJECTION, SOLUTION INTRAVENOUS CONTINUOUS
Status: DISCONTINUED | OUTPATIENT
Start: 2021-05-01 | End: 2021-05-03

## 2021-05-01 RX ORDER — POTASSIUM CHLORIDE 14.9 MG/ML
20 INJECTION INTRAVENOUS ONCE
Status: COMPLETED | OUTPATIENT
Start: 2021-05-01 | End: 2021-05-01

## 2021-05-01 RX ORDER — DEXTROSE MONOHYDRATE 25 G/50ML
50 INJECTION, SOLUTION INTRAVENOUS
Status: DISCONTINUED | OUTPATIENT
Start: 2021-05-01 | End: 2021-05-07

## 2021-05-01 NOTE — PROGRESS NOTES
Ángel Wells Patient Status:  Inpatient    1965 MRN [id number]   [facility] 4SW-A Attending Keron Mcqueen MD   [facility] Day # 1 PCP Deric Barcenas MD     Critical Care Progress Note      Assessment/Plan:  1.  Sepsis  - Suspected i MMM  Neck: Supple, no adenopathy or elevation in JVP  Cardiovascular: RRR, no murmurs or extra heart sounds   Respiratory: CTAB   GI: EDYTA drains in place with minimal purulent drainage.  Mild TTP  Ext: No cyanosis, clubbing or edema     Recent Labs   Lab 05/

## 2021-05-01 NOTE — PHYSICAL THERAPY NOTE
Attempted to see pt. Pt just underwent an IR drain placement and is on bedrest for 2 hours. Will hold inpt PT at this time. Will cont to follow as appropriate.

## 2021-05-01 NOTE — PROCEDURES
[facility]  Procedure Note    Bobbi Comp Patient Status:  Inpatient    1965 MRN [id number]   [facility] 4SW-A Attending Anaya Fermin MD   Hosp Day # 1 PCP April Toussaint MD     Procedure: Abscess drain placement x 2

## 2021-05-01 NOTE — CONSULTS
INFECTIOUS DISEASE CONSULT NOTE    Ro Bush Patient Status:  Inpatient    1965 MRN [id number]   [facility] 4SW-A Attending Alberto Doty MD   Hosp Day # 1 PCP Ada Noble 11/2015    4 small adeonomas, diverticulosis. repeat 2018   • EGD  11/2015     schatzki's ring, non obstructive. Erosive esophagitis, LA grad 1 (no barretts on path).  Gastritis, no hpylori on path   • NEEDLE BIOPSY LIVER     • OTHER      biopsies   • OTHE MAGNESIA) 400 MG/5ML suspension 30 mL, 30 mL, Oral, Daily PRN  •  bisacodyl (DULCOLAX) rectal suppository 10 mg, 10 mg, Rectal, Daily PRN  •  ondansetron HCl (ZOFRAN) injection 8 mg, 8 mg, Intravenous, Q6H PRN  •  Heparin Sodium (Porcine) 5000 UNIT/ML inje swelling noted.   Integument: No rash    Laboratory Data:  Lab Results   Component Value Date    WBC 10.1 05/01/2021    HGB 8.5 05/01/2021    HCT 27.6 05/01/2021    .0 05/01/2021    CREATSERUM 0.35 05/01/2021    BUN 7 05/01/2021     05/01/2021 s/p total colectomy  4. Sarcoidosis on chronic Prednisone     PLAN:    -Await culture results  -General Surgery consultation noted  -Plans for IR evaluation  -Continue Meropenem empirically for abdominal abscesses  -Discussed with patient at bedside.  All q

## 2021-05-01 NOTE — PLAN OF CARE
Assumed care of patient at 0730, tele on and hr in the 90's at this time. A+ox 4, follows all commands. To ct/IR today for drain placement x 2, tolerated well. Drains flushed per orders. Now with jay drain x 5 and ostomy.  Denies sob or chest pain, does c/o

## 2021-05-01 NOTE — PLAN OF CARE
Assumed care of patient at [phone] from ER. Admission navigator completed and patient settled. Admission orders released. Daughter at bedside.

## 2021-05-01 NOTE — IMAGING NOTE
NPO status verified  Pertinent labs and radiology imaging reviewed  Consent signed and on file    Patient tolerated procedure without any immediate complications noted. Only fentanyl given as per DR Woodard orders. No sedation due to hypotension.   Renae Giling

## 2021-05-01 NOTE — DIETARY MALNUTRITION NOTE
[facility]    NUTRITION ASSESSMENT    Pt meets acute - moderate malnutrition criteria      NUTRITION INTERVENTION:     1. Meal and Snacks - monitor patient po intake. Encourage adequate po of appropriate diet.   2. Medical Food Supplements - RD added E No  Cultural/Ethnic/Latter day Preferences Addresses: Yes    GI SYSTEM REVIEW: nausea and abdominal pain    NUTRITION RELATED PHYSICAL FINDINGS:     1. Body Fat/Muscle Mass: Mild wasting      2.  Fluid Accumulation: none     NUTRITION PRESCRIPTION:   Calorie

## 2021-05-01 NOTE — RESPIRATORY THERAPY NOTE
JEANETTE protocol initiated. Pt reports CPAP @home, but does not use. Pt refusing CPAP at this time. Will continue to monitor.

## 2021-05-01 NOTE — PROGRESS NOTES
[facility]  Progress note     Angel Galloway Patient Status:  Emergency    1965 MRN [id number]   Location [facility] Attending Grace Ellsworth MD   Hosp Day # 1 PCP Anabell Medina MD     Chief Complaint: f 03/24/2021       Pro-Calcitonin  No results for input(s): PCT in the last 168 hours. Cardiac  No results for input(s): TROP, PBNP in the last 168 hours. Creatinine Kinase  No results for input(s): CK in the last 168 hours.     Inflammatory Markers  No

## 2021-05-01 NOTE — PROGRESS NOTES
[facility]  Progress Note    Ro Bush Patient Status:  Inpatient    1965 MRN [id number]   [facility] 4SW-A Attending Alberto Doty MD   Hosp Day # 1 PCP Eufemia Rodriguez MD     Subjective:  Patient awake and alert.   He s Lab Results   Component Value Date    MG 1.4 05/01/2021    PHOS 2.7 05/01/2021       Assessment/Plan:  Patient Active Problem List:     Hyperlipidemia     Sarcoidosis     Lipoma of torso     Erectile dysfunction, unspecified erectile dysfunction type

## 2021-05-02 PROCEDURE — 99232 SBSQ HOSP IP/OBS MODERATE 35: CPT | Performed by: SURGERY

## 2021-05-02 PROCEDURE — 99232 SBSQ HOSP IP/OBS MODERATE 35: CPT | Performed by: HOSPITALIST

## 2021-05-02 RX ORDER — POTASSIUM CHLORIDE 20 MEQ/1
40 TABLET, EXTENDED RELEASE ORAL EVERY 4 HOURS
Status: COMPLETED | OUTPATIENT
Start: 2021-05-02 | End: 2021-05-02

## 2021-05-02 RX ORDER — VANCOMYCIN/0.9 % SOD CHLORIDE 1.75 G/5
25 PLASTIC BAG, INJECTION (ML) INTRAVENOUS ONCE
Status: COMPLETED | OUTPATIENT
Start: 2021-05-02 | End: 2021-05-02

## 2021-05-02 RX ORDER — MAGNESIUM OXIDE 400 MG (241.3 MG MAGNESIUM) TABLET
400 TABLET ONCE
Status: COMPLETED | OUTPATIENT
Start: 2021-05-02 | End: 2021-05-02

## 2021-05-02 RX ORDER — MAGNESIUM SULFATE HEPTAHYDRATE 40 MG/ML
2 INJECTION, SOLUTION INTRAVENOUS ONCE
Status: DISCONTINUED | OUTPATIENT
Start: 2021-05-02 | End: 2021-05-02

## 2021-05-02 NOTE — PROGRESS NOTES
Angel Galloway Patient Status:  Inpatient    1965 MRN [id number]   [facility] 4SW-A Attending Chela Rodriguez MD   Hosp Day # 2 PCP Anabell Medina MD     Critical Care Progress Note      Assessment/Plan:  1.  Sepsis  - Recent prol Nose, Throat, Mouth: OP clear, MMM  Neck: Supple, no adenopathy or elevation in JVP  Cardiovascular: RRR, no murmurs or extra heart sounds   Respiratory: CTAB   GI: Soft, mild TTP, EDYTA drains with purulent output, +normoactive BS   Ext: No cyanosis, clubbin

## 2021-05-02 NOTE — PROGRESS NOTES
[facility]  Progress Note      Veronica Hogue Patient Status:  Inpatient    1965 MRN [id number]   [facility] 4SW-A Attending Sandeep Gross MD   Hosp Day # 2 PCP Collins Ochoa MD       54year-old male with multiple abdomina

## 2021-05-02 NOTE — OCCUPATIONAL THERAPY NOTE
OCCUPATIONAL THERAPY EVALUATION - INPATIENT     Room Number: 306/306-A  Evaluation Date: 5/2/2021  Type of Evaluation: Initial  Presenting Problem: Sepsis with hypotension d/t intra-abd abscesses s/p drain placement 5/1/21    Physician Order: IP Consult to • OTHER      biopsies   • OTHER SURGICAL HISTORY      septoplasty   • OTHER SURGICAL HISTORY      kidney stones       OCCUPATIONAL PROFILE    HOME SITUATION  Type of Home: House (admitted from [place] [id number])  Home Layout: Two level  Lives With: Spouse; Son    Konstantin which includes using toilet, bedpan or urinal? : A Little  -   Putting on and taking off regular upper body clothing?: A Little  -   Taking care of personal grooming such as brushing teeth?: None  -   Eating meals?: None    AM-PAC Score:  Score: 18  Approx ability to participate in self-care, functional mobility, instrumental activities of daily living, rest and sleep, work, leisure and social participation.      The patient is functioning below his previous functional level and would benefit from skilled inp perform lower body dressing w/ mod I and with adaptive equipment PRN  Patient will perform toileting with mod I and with adaptive equipment PRN.     Functional Transfer Goals:  Patient will transfer from sit to supine:  with mod I  Patient will transfer fr

## 2021-05-02 NOTE — PROGRESS NOTES
[facility] Dosing Service    Initial Pharmacokinetic Consult for Vancomycin Dosing     Brianna Ramirez is a 54year old patient who is being treated for sepsis and intra-abdominal abscess .   Pharmacy has been asked to dose Vancomycin by Dr. Geni Sheldon Extension: [phone]

## 2021-05-02 NOTE — PROGRESS NOTES
INFECTIOUS DISEASE PROGRESS NOTE    Ro Bush Patient Status:  Inpatient    1965 MRN [id number]   [facility] 4SW-A Attending Alberto Doty MD   Hosp Day # 2 PCP Reny Muniz Daily PRN  •  bisacodyl (DULCOLAX) rectal suppository 10 mg, 10 mg, Rectal, Daily PRN  •  ondansetron HCl (ZOFRAN) injection 8 mg, 8 mg, Intravenous, Q6H PRN  •  Heparin Sodium (Porcine) 5000 UNIT/ML injection 5,000 Units, 5,000 Units, Subcutaneous, Q8H SC noted.  Integument: No rash    Laboratory Data:  Lab Results   Component Value Date    WBC 10.4 05/02/2021    HGB 8.4 05/02/2021    HCT 27.1 05/02/2021    .0 05/02/2021    CREATSERUM 0.24 05/02/2021    BUN 4 05/02/2021     05/02/2021    K 3.6 colectomy  4.  Sarcoidosis on chronic Prednisone     PLAN:    -Await culture results from 5/1 IR procedure  -General Surgery consultation noted  -Added Vancomycin IV   -Continue Meropenem empirically for abdominal abscesses      MD ANDERS Whitten [place]

## 2021-05-02 NOTE — PROGRESS NOTES
[facility]  Progress note     Sai Hayes Patient Status:  Emergency    1965 MRN [id number]   Location [address] [facility] Attending Teja Valenzuela MD   Hosp Day # 2 PCP Lamar Mehta MD     Chief Complaint: f 1. 11       COVID-19 Lab Results    COVID-19  Lab Results   Component Value Date    COVID19 Not Detected 04/30/2021    COVID19 Not Detected 04/06/2021    COVID19 Not Detected 03/24/2021       Pro-Calcitonin  No results for input(s): PCT in the last 168 hour

## 2021-05-02 NOTE — PLAN OF CARE
Patient transferred from ICU. 5 drains flushed, aspirated as ordered. Drain #2 does not flush. Old incisional site intact. States pain 8/10. POC discussed, all questions and concerns addressed. All safety measures in place. Will continue to monitor.

## 2021-05-02 NOTE — PLAN OF CARE
Pt A & O, very pleasant but tired of being hospitalized. C/O severe abd pain 7, given scheduled Oxy then pain up to 8, given Dilaudid IV. Pt has orders to transfer to surgical floor. Son at bedside. Given IS pt already knows how to use it.  Report given to

## 2021-05-02 NOTE — PHYSICAL THERAPY NOTE
PHYSICAL THERAPY EVALUATION - INPATIENT     Room Number: 458/458-A  Evaluation Date: 5/2/2021  Type of Evaluation: Initial  Physician Order: PT Eval and Treat    Presenting Problem: Sepsis with hypotension  Reason for Therapy: Mobility Dysfunction an Procedure Laterality Date   • BIOPSY/REMOVAL, LYMPH NODE(S) Bilateral 2013    Lymph node removal (neck)   • COLONOSCOPY     • COLONOSCOPY  11/2015    4 small adeonomas, diverticulosis. repeat 2018   • EGD  11/2015     schatzki's ring, non obstructive.  Er Fair  Static Standing: Fair -  Dynamic Standing: Fair -    ADDITIONAL TESTS                                    NEUROLOGICAL FINDINGS                      ACTIVITY TOLERANCE                         O2 WALK       AM-PAC '6-Clicks' INPATIENT SHORT FORM - BASI noted. Pt transferred from stand>sit with CGA and cues for sequencing. Educated pt on PT findings, activity recommendations, discharge recommendations, AR recommendations. Pt and pt's son agreeable and verbalized understanding.      Exercise/Education Brian Alexander functional independence. Pt is an excellent candidate for AR given high PLOF and motivation to return. DISCHARGE RECOMMENDATIONS  PT Discharge Recommendations: Acute rehabilitation    PLAN  PT Treatment Plan: Bed mobility; Body mechanics; Coordination; Endu

## 2021-05-02 NOTE — PLAN OF CARE
Rec'd report from previous RN, care assumed at [facility], pt assessed per flow sheets. Pt alert, oriented x4, appears somewhat depressed, flat affect. Pt remains on room air with sats greater than 92%, lungs diminished to bases.   Pt with sores in mouth, nysta

## 2021-05-02 NOTE — PROGRESS NOTES
[facility]  Progress Note    Ismael Vicente Patient Status:  Inpatient    1965 MRN [id number]   [facility] 4SW-A Attending Kathya Brantley MD   Hosp Day # 2 PCP Neel Fuentes MD     Subjective:   The patient is sitting up in b Sarcoidosis     Lipoma of torso     Erectile dysfunction, unspecified erectile dysfunction type     Right low back pain     Diverticulosis of large intestine without perforation or abscess without bleeding     Second degree hemorrhoids     Acute gastritis and evaluation by the physician assistant. I have personally examined the patient and reviewed all relevant labs and reports. I agree with her physical exam and the data listed in the report, and I have made any relevant changes in editing her note.

## 2021-05-03 PROCEDURE — 99232 SBSQ HOSP IP/OBS MODERATE 35: CPT | Performed by: SURGERY

## 2021-05-03 PROCEDURE — 02HV33Z INSERTION OF INFUSION DEVICE INTO SUPERIOR VENA CAVA, PERCUTANEOUS APPROACH: ICD-10-PCS | Performed by: HOSPITALIST

## 2021-05-03 PROCEDURE — B548ZZA ULTRASONOGRAPHY OF SUPERIOR VENA CAVA, GUIDANCE: ICD-10-PCS | Performed by: HOSPITALIST

## 2021-05-03 PROCEDURE — 99232 SBSQ HOSP IP/OBS MODERATE 35: CPT | Performed by: HOSPITALIST

## 2021-05-03 RX ORDER — CEFAZOLIN SODIUM/WATER 2 G/20 ML
2 SYRINGE (ML) INTRAVENOUS EVERY 8 HOURS
Status: DISCONTINUED | OUTPATIENT
Start: 2021-05-03 | End: 2021-05-05

## 2021-05-03 RX ORDER — VANCOMYCIN HYDROCHLORIDE 125 MG/1
125 CAPSULE ORAL DAILY
Status: DISCONTINUED | OUTPATIENT
Start: 2021-05-03 | End: 2021-05-07

## 2021-05-03 RX ORDER — METRONIDAZOLE 500 MG/100ML
500 INJECTION, SOLUTION INTRAVENOUS EVERY 8 HOURS
Status: DISCONTINUED | OUTPATIENT
Start: 2021-05-03 | End: 2021-05-05

## 2021-05-03 RX ORDER — LIDOCAINE HYDROCHLORIDE 10 MG/ML
5 INJECTION, SOLUTION EPIDURAL; INFILTRATION; INTRACAUDAL; PERINEURAL ONCE
Status: DISCONTINUED | OUTPATIENT
Start: 2021-05-03 | End: 2021-05-06 | Stop reason: ALTCHOICE

## 2021-05-03 RX ORDER — MAGNESIUM OXIDE 400 MG (241.3 MG MAGNESIUM) TABLET
800 TABLET ONCE
Status: COMPLETED | OUTPATIENT
Start: 2021-05-03 | End: 2021-05-03

## 2021-05-03 NOTE — PROGRESS NOTES
The patient has stable vital signs. He is resting in bed and watching television. Oxycontin ER administered per MD order for his report of 8 out of 10 pain.

## 2021-05-03 NOTE — PLAN OF CARE
PAtient is alert and oriented x3  Up with SBA +walker  Voiding  Stool noted in ostomy bag  Tolerating diet  Pain controlled with PO pain meds  Drains flushed and aspirated with the exception of drain marked #2 as it is not able to be flushed    Problem: GA adequate nutritional intake and initiate nutrition consult as needed  - Instruct patient on self management of diabetes  Outcome: Progressing

## 2021-05-03 NOTE — CDS QUERY
Impaired Nutritional Status  DOCUMENTATION CLARIFICATION FORM  Dear Doctor:  Clinical information suggests impaired nutritional status.  For accurate ICD-10-CM code assignment,   PLEASE “X” THE  DIAGNOSIS THAT APPLIES TO CURRENT NUTRITIONAL STATUS:     [ contact Clinical Documentation :  Viet Egan RN, BSN  Ext [phone] or [email or web address]                          Thank you.                                                            THIS FORM IS A PERMANENT PART OF THE M

## 2021-05-03 NOTE — PROGRESS NOTES
[address] [facility]  TEL: [phone]  FAX: [phone]    Ro Vargasiden Patient Status:  Inpatient    1965 MRN [id number]   [facility] 3NW-A Attending Alberto Doty MD   Hosp Day # 3 PCP Kamryn Presley --  1.7*  --   --    *  --   --  134*  --  136  --   --    K  --  4.7   < > 3.8   < > 3.6 4.5 4.2   CL 97*  --   --  104  --  105  --   --    CO2 25.0  --   --  22.0  --  25.0  --   --    ALKPHO 98  --   --   --   --  69  --   --    AST  --  14*  -

## 2021-05-03 NOTE — CONSULTS
PHYSICAL MEDICINE AND REHABILITATION CONSULTATION       Location [facility] 3NW-A Attending Henrique Pickett MD   Hosp Day # 3 PCP Nohemi Garner MD     Patient Identification  Susannah Wolfe is a 54year old male.   :  1965  Admit Date:   100 mL IVPB, 15 mmol, Intravenous, Once  [COMPLETED] magnesium oxide (MAG-OX) tab 800 mg, 800 mg, Oral, Once  lidocaine PF (XYLOCAINE) 1% injection, 5 mL, Intradermal, Once  ceFAZolin sodium (ANCEF/KEFZOL) 2 GM/20ML premix IV syringe 2 g, 2 g, Intravenous, Nightly PRN  PEG 3350 (MIRALAX) powder packet 17 g, 17 g, Oral, Daily PRN  magnesium hydroxide (MILK OF MAGNESIA) 400 MG/5ML suspension 30 mL, 30 mL, Oral, Daily PRN  bisacodyl (DULCOLAX) rectal suppository 10 mg, 10 mg, Rectal, Daily PRN  ondansetron HCl History:  Past Surgical History:   Procedure Laterality Date   • BIOPSY/REMOVAL, LYMPH NODE(S) Bilateral 2013    Lymph node removal (neck)   • COLONOSCOPY     • COLONOSCOPY  11/2015    4 small adeonomas, diverticulosis.  repeat 2018   • EGD  11/2015     nader guard assist  Distance (ft): 150  Assistive Device: Rolling walker  Pattern:  (decreased emigdio)  Stoop/Curb Assistance: Not tested    Patient alert, oriented x 3.   Patient is able to follow simple motor commands consistently  Supine to EOB with min A and hemorrhoids     Acute gastritis     Bursitis of left shoulder     Biceps tendonitis, left     Rotator cuff tendonitis, left     Eye muscle twitches     Acute nonintractable headache     Arthralgia     Body aches     Gastroesophageal reflux disease     NAFL continue to  work on improving fine motor function, independence with self care, ADLs, balance. 24 hr rehab nursing also beneficial for medication management, pressure sore prevention, bowel and bladder management, skin management.  Physiatric medical over

## 2021-05-03 NOTE — IMAGING NOTE
55 yo M multiple post op fluid collections s/p drain placement. All drains with outputs of 60+ mL charted.   Would continue drains given output, if consistently <10-15 mL per day then may consider CT abscessogram prior to removal.  Please call with [facility]

## 2021-05-03 NOTE — PROGRESS NOTES
[facility]  Progress note     Isaac Stuart Patient Status:  Emergency    1965 MRN [id number]   Location [address] [facility] Attending Geraldine Dueñas MD   Hosp Day # 3 PCP Alton Lora MD     Chief Complaint: f 25.0  --   --    ALKPHO 98  --   --   --   --  69  --   --    AST  --  14*  --   --   --  8*  --   --    ALT 16  --   --   --   --  13*  --   --    BILT 0.4  --   --   --   --  0.2  --   --    TP 5.5*  --   --   --   --  4.5*  --   --     < > = values in t

## 2021-05-03 NOTE — TELEPHONE ENCOUNTER
Pt currently inpatient at [facility], will leave scheduled appointment for 5/10/2021 for now.

## 2021-05-03 NOTE — PAYOR COMM NOTE
--------------  ADMISSION REVIEW     Payor: JAD KLINE  Subscriber #:  [id number]  Authorization Number: [id number]    Admit date: 4/30/21  Admit time:  6:45 PM      Patient Seen in: [facility] Emergency Department    History   Stated Complaint: oDrie Bond Social History    Tobacco Use      Smoking status: Current Every Day Smoker        Packs/day: 0.25        Years: 8.00        Pack years: 2        Types: Cigarettes        Quit date:         Years since quittin.3      Smokeless tobacco: Never U - Abnormal; Notable for the following components:    AST 14 (*)     All other components within normal limits   CBC W/ DIFFERENTIAL - Abnormal; Notable for the following components:    WBC 19.6 (*)     RBC 3.60 (*)     HGB 10.5 (*)     HCT 33.2 (*)     RDW care additionally. The patient will be admitted to the ICU given his hypotension.       Disposition and Plan   Clinical Impression:  Sepsis with hypotension         [facility]  History and Physical     History of Present Illness: Dwain Dexter i consulted  2. Sarcoidosis  - On chronic steroid therapy   - Will change to stress dose steroids if hypotension persists despite fluids  3. JEANETTE  - Home CPAP with sleep  4. FEN  - NPO  5.  Prophy  - SCDs, subcutaneous heparin  Dispo  - Will follow    SURGERY: 05/02/2021      05/02/2021     K 3.6 05/02/2021      05/02/2021     CO2 25.0 05/02/2021     GLU 85 05/02/2021     CA 7.5 05/02/2021     ALB 1.7 05/02/2021     ALKPHO 69 05/02/2021     BILT 0.2 05/02/2021     TP 4.5 05/02/2021     AST 8 05/02/20 per day then may consider CT abscessogram prior to removal.      ID:  Antibiotic(s): nancie d#3     Subjective:  Pt seen and examined. About the same. Still with some abd pain. No fevers. Seems to be tolerating abx well. No rash or itching.  No vomiting    Vi Given 5,000 Units Subcutaneous (Left Lower Abdomen) Gopi Thacker RN    5/2/2021 2117 Given 5,000 Units Subcutaneous (Left Lower Abdomen) Duarte Ramos RN      HYDROmorphone HCl (DILAUDID) 1 MG/ML injection 0.8 mg     Date Action Dose Route User 5/3/2021 1208 New Bag 15 mmol Intravenous Evette Torres RN      Sulfamethoxazole-TMP DS (BACTRIM DS) 800-160 MG per tab 1 tablet     Date Action Dose Route User    5/3/2021 0955 Given 1 tablet Oral Evette Torres RN      tamsulosin HCl [facility])

## 2021-05-03 NOTE — PROGRESS NOTES
[facility]  Progress Note    Luda Pickett Patient Status:  Inpatient    1965 MRN [id number]   [facility] 3NW-A Attending Anh Cleary MD   Hosp Day # 3 PCP Megan Salazar MD     Subjective:   The patient states that he is Gastroesophageal reflux disease     NAFLD (nonalcoholic fatty liver disease)     Hypokalemia     Leukocytosis     SALLY (acute kidney injury) ([facility] [address]     Hyponatremia     Adjustment disorder with mixed anxiety and depressed mood     Pancreatic mass     Drug-in functioning well. His abdomen is soft EDYTA drains are in place draining purulent fluid. Infectious disease managing antibiotics. CPM  .    My total face time with this patient was 30 minutes.   Greater than half of our visit was spent in counseling the pat

## 2021-05-03 NOTE — CM/SW NOTE
05/03/21 1000   CM/SW Referral Data   Referral Source Social Work (self-referral)   Reason for Referral Discharge planning   Informant Patient   Readmission Assessment   Was pt. discharged w/out services? No       MSW spoke to pt at bedside.  PT rec is A

## 2021-05-04 PROCEDURE — 99232 SBSQ HOSP IP/OBS MODERATE 35: CPT | Performed by: HOSPITALIST

## 2021-05-04 RX ORDER — MAGNESIUM OXIDE 400 MG (241.3 MG MAGNESIUM) TABLET
800 TABLET ONCE
Status: COMPLETED | OUTPATIENT
Start: 2021-05-04 | End: 2021-05-04

## 2021-05-04 RX ORDER — OXYCODONE HYDROCHLORIDE 20 MG/1
20 TABLET ORAL EVERY 4 HOURS
Qty: 18 TABLET | Refills: 0 | Status: SHIPPED | OUTPATIENT
Start: 2021-05-04

## 2021-05-04 RX ORDER — HYDROMORPHONE HYDROCHLORIDE 1 MG/ML
1 INJECTION, SOLUTION INTRAMUSCULAR; INTRAVENOUS; SUBCUTANEOUS ONCE
Status: COMPLETED | OUTPATIENT
Start: 2021-05-04 | End: 2021-05-04

## 2021-05-04 RX ORDER — OXYCODONE HCL 20 MG/1
20 TABLET, FILM COATED, EXTENDED RELEASE ORAL 3 TIMES DAILY
Qty: 9 TABLET | Refills: 0 | Status: SHIPPED | OUTPATIENT
Start: 2021-05-04

## 2021-05-04 RX ORDER — TAMSULOSIN HYDROCHLORIDE 0.4 MG/1
0.4 CAPSULE ORAL NIGHTLY
Qty: 30 CAPSULE | Refills: 0 | Status: SHIPPED | COMMUNITY
Start: 2021-05-04

## 2021-05-04 NOTE — PAYOR COMM NOTE
--------------  5/3-4 CONTINUED STAY REVIEW    Payor: JAD PPO  Subscriber #:  [id number]  Authorization Number: [id number]       5/3:    SW:  PT rec is ACR and pt would like to see if he can get into [place].  He was admitted to [facility] Given 2 g Intravenous Rudolph Roa RN      dexamethasone (DECADRON) tab 2 mg     Date Action Dose Route User    5/4/2021 1029 Given 2 mg Oral Rudolph Roa RN      gabapentin (NEURONTIN) cap 100 mg     Date Action Dose Route User    5/4/2 Christie Jane RN      oxyCODONE HCl ER (OXYCONTIN) 12 hr tab 20 mg     Date Action Dose Route User    5/4/2021 1019 Given 20 mg Oral Christie Jane RN    5/3/2021 2150 Given 20 mg Oral Madenzel Ta RN    5/3/2021 1535 Given 20 mg Oral Klomans

## 2021-05-04 NOTE — DIETARY MALNUTRITION NOTE
[facility]    NUTRITION ASSESSMENT    Pt meets acute - moderate malnutrition criteria      NUTRITION INTERVENTION:     1. Meal and Snacks - [place] diet-monitor patient po intake. Encourage adequate po of appropriate diet.   2. Medical Food Supplements Oral Supplements: Ensure HP BID    Percent Meals Eaten (last 3 days)     Date/Time Percent Meals Eaten (%)    05/01/21 0800  0 % NPO    05/01/21 1200  0 %  NPO            FOOD/NUTRITION RELATED HISTORY:   Appetite: Fair  Intake: >50%  Intake Meeting Need

## 2021-05-04 NOTE — PROGRESS NOTES
[facility]  Progress Note    Ania Blandon Patient Status:  Inpatient    1965 MRN [id number]   [facility] 3NW-A Attending Litzy Denise MD   Hosp Day # 4 PCP Mihaela Ng MD     Subjective:   The patient states today he is aches     Gastroesophageal reflux disease     NAFLD (nonalcoholic fatty liver disease)     Hypokalemia     Leukocytosis     SALLY (acute kidney injury) ([facility] [address]     Hyponatremia     Adjustment disorder with mixed anxiety and depressed mood     Pancreatic mass no bilious or feculent content. Midline incision intact.    Ileostomy healthy.    -2 additional drains placed  -ongoing complex intraabdominal collections  -Discussed with patient possibility of future abdominal surgery to washout collections vs ongoing ma

## 2021-05-04 NOTE — CM/SW NOTE
9:58am  MSW met with pt at bedside and we discussed possible dc. Barrier to dc: medical clearance, pt states he has pain.  Insurance pending approval for rosalina amador    Pt wants to use a w/c Remberto Travis, he knows there is a cost. medicar put on will call for 5/4

## 2021-05-04 NOTE — PLAN OF CARE
PAtient is alert and oriented x3  Up with SBA +walker  Voiding  Stool noted in ostomy bag; patient due for bag change. RN changed bag with education and some participation from patient; he is not self care with ostomy. Receptive to teaching.    Pain control symptoms of hyperglycemia and hypoglycemia  - Administer ordered medications to maintain glucose within target range  - Assess barriers to adequate nutritional intake and initiate nutrition consult as needed  - Instruct patient on self management of diabet

## 2021-05-04 NOTE — PROGRESS NOTES
[facility]  Progress note     Denise Sena Patient Status:  Emergency    1965 MRN [id number]   Location [address] [facility] Attending Sapna Saldivar MD   Hosp Day # 4 PCP Esau López MD     Chief Complaint: f ALKPHO 98  --   --   --   --  69  --   --    AST  --  14*  --   --   --  8*  --   --    ALT 16  --   --   --   --  13*  --   --    BILT 0.4  --   --   --   --  0.2  --   --    TP 5.5*  --   --   --   --  4.5*  --   --     < > = values in this interval no

## 2021-05-04 NOTE — PROGRESS NOTES
[address] [facility]  TEL: [phone]  FAX: [phone]    Ravin Del Cid Patient Status:  Inpatient    1965 MRN [id number]   [facility] 3NW-A Attending Thien Banks MD   Hosp Day # 4 [facility] --  1.7*  --   --    *  --   --  134*  --  136  --   --    K  --  4.7   < > 3.8   < > 3.6 4.5 4.2   CL 97*  --   --  104  --  105  --   --    CO2 25.0  --   --  22.0  --  25.0  --   --    ALKPHO 98  --   --   --   --  69  --   --    AST  --  14*  -

## 2021-05-04 NOTE — IMAGING NOTE
Pt has 4 abdominal drains placed by IR.   2 10 Fr drains were placed on 3/25 in LUQ and midline lower abdomen, with the lower drain advanced on 5/1. Both of these drains were in good position with significant residual collections on most recent scans.   2 8

## 2021-05-04 NOTE — PLAN OF CARE
Pt axox4, VSS, pt resting in bed, pts has pain 8/10 in LRQ gave oral pain meds. Pt has five EDYTA drain with tan fluid after flushing with 10ml NS.   Pt lungs are clear hyperactive bowel sounds, tele monitor NSR, pt has right double lumen picc line infusing a hypoglycemia  - Administer ordered medications to maintain glucose within target range  - Assess barriers to adequate nutritional intake and initiate nutrition consult as needed  - Instruct patient on self management of diabetes  Outcome: Progressing

## 2021-05-05 PROCEDURE — 99233 SBSQ HOSP IP/OBS HIGH 50: CPT | Performed by: HOSPITALIST

## 2021-05-05 RX ORDER — VANCOMYCIN HYDROCHLORIDE 125 MG/1
125 CAPSULE ORAL DAILY
Qty: 14 CAPSULE | Refills: 0 | Status: SHIPPED | OUTPATIENT
Start: 2021-05-06 | End: 2021-05-20

## 2021-05-05 RX ORDER — MAGNESIUM OXIDE 400 MG (241.3 MG MAGNESIUM) TABLET
800 TABLET ONCE
Status: COMPLETED | OUTPATIENT
Start: 2021-05-05 | End: 2021-05-05

## 2021-05-05 RX ORDER — ERTAPENEM 1 G/1
1 INJECTION, POWDER, LYOPHILIZED, FOR SOLUTION INTRAMUSCULAR; INTRAVENOUS DAILY
Qty: 14 EACH | Refills: 0 | Status: SHIPPED | OUTPATIENT
Start: 2021-05-05 | End: 2021-05-19

## 2021-05-05 RX ORDER — SODIUM CHLORIDE 9 MG/ML
INJECTION, SOLUTION INTRAVENOUS CONTINUOUS
Status: DISCONTINUED | OUTPATIENT
Start: 2021-05-05 | End: 2021-05-07

## 2021-05-05 RX ORDER — KETOROLAC TROMETHAMINE 30 MG/ML
30 INJECTION, SOLUTION INTRAMUSCULAR; INTRAVENOUS EVERY 6 HOURS PRN
Status: DISCONTINUED | OUTPATIENT
Start: 2021-05-05 | End: 2021-05-07

## 2021-05-05 NOTE — CM/SW NOTE
CM received order to initiate transfer to [facility] for higher level of care. [facility] center #[phone] called and face sheet faxed to [facility]. Imaging requested to be copied to CD. Please call imaging at X  for pick-up or delivery.     Lenard valadez

## 2021-05-05 NOTE — CM/SW NOTE
9:43am  MSW updated patient that insurance is pending but hopefully dc today. DC Plan:  [place] [facility]  Call report to:  Room #:   Medicar on will call

## 2021-05-05 NOTE — PLAN OF CARE
PT axox4 VSS, resting in bed, pt has 7/10 abdominal pain relieved with oral pain meds, abdomen is soft tender to touch non-distended, with 5 EDYTA drains and ileostomy draining dark greenish liquid, all dressing changed, and ostomy system changed.   Pt has leandra maintained within prescribed range  Description: INTERVENTIONS:  - Monitor Blood Glucose as ordered  - Assess for signs and symptoms of hyperglycemia and hypoglycemia  - Administer ordered medications to maintain glucose within target range  - Assess barri

## 2021-05-05 NOTE — PLAN OF CARE
Received patient from floor at [phone] very tearful with all the decisions that he and his family has to make. Patient stated that if he has the surgery he will die and if he doesn't have it he will die. Family would like to get a second opinion at [facility].  Em

## 2021-05-05 NOTE — PROGRESS NOTES
[facility]    S: Patient states he hurts \"all over\". Most of his pain is abdominal. He denies shortness of breath or chest pain.     Meds:  • meropenem  500 mg Intravenous Q8H   • lidocaine (PF)  5 mL Intradermal Once   • vancomycin HCl 05/04/21 0721 05/05/21  0609 05/05/21  1120   *   < >  --  134*  --  136  --   --   --   --   --  136   K  --    < > 4.7 3.8   < > 3.6   < > 4.5 4.2  --   --  3.5   CL 97*   < >  --  104  --  105  --   --   --   --   --  104   CO2 25.0   < >  --  22 code  -transfer to ICU, pt is critically ill  -plan is for transfer to [facility] when bed available, for second opinion    > 35 min critical care time    Andie Modi M.D.   Pulmonary/Critical Care

## 2021-05-05 NOTE — PROGRESS NOTES
[facility]  Progress Note     Angel Galloway Patient Status:  Inpatient    1965 MRN [id number]   [facility] 3NW-A Attending Greg Bean MD   Hosp Day # 5 PCP Anabell Medina MD     Chief Complaint: abd pain    S: Patient st --  1.7*  --   --    *  --   --  134*  --  136  --   --    K  --  4.7   < > 3.8   < > 3.6 4.5 4.2   CL 97*  --   --  104  --  105  --   --    CO2 25.0  --   --  22.0  --  25.0  --   --    ALKPHO 98  --   --   --   --  69  --   --    AST  --  14*  -- prophylactic vanc, since he had colectomy?-pos on 3/6/2021  3. Hypomagnesemia-replace per iv  4. 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 Hx necrotizing pancreatitis  6.  Sarcoidosis-oral baseline decadron; no stress dosed steroids at [facility]

## 2021-05-05 NOTE — PROGRESS NOTES
Pt is fatigued, oriented x4. Tearful, depressed, and feeling worse today than before.  This AM pt was hypotensive (SBP 70-80s) and tachycardic (120-150s), fluid boluses x3 (500ml x2 and 1000ml x1) with some improvement, BP now 90 systolic and HR improved 80

## 2021-05-05 NOTE — PHYSICAL THERAPY NOTE
Acknowledging duplicate orders. Evaluation completed on 5/2 with recommendations for AR. Pt was just seen this AM for treatment session. PT is following while in house.

## 2021-05-05 NOTE — PROGRESS NOTES
[facility]  Progress Note    Ravin Del Cid Patient Status:  Inpatient    1965 MRN [id number]   [facility] 3NW-A Attending Candice Santamaria MD   Hosp Day # 5 PCP Ramírez Atkins MD     Subjective: The patient is resting in bed.  He tendonitis, left     Eye muscle twitches     Acute nonintractable headache     Arthralgia     Body aches     Gastroesophageal reflux disease     NAFLD (nonalcoholic fatty liver disease)     Hypokalemia     Leukocytosis     SALLY (acute kidney injury) ([address] Differential added to CBC from earlier today. ABG with Lactate ordered. NPO. Hold Heparin. Discussed possible transition to more broad spectrum antibiotics with nursing staff. ID paged.      Aniya Ortiz PA-C  5/5/2021  12:30 PM     Addendum:   fistula involvement, and abscess. Patient now has 5 percutaneous drains.    -It has been about 2 months since the total abdominal colectomy, and despite increasing number of percutaneous drains, fluid collections have not resolved.   There has not been any

## 2021-05-05 NOTE — PHYSICAL THERAPY NOTE
PHYSICAL THERAPY TREATMENT NOTE - INPATIENT    Room Number: 475/475-G     Session: 1   Number of Visits to Meet Established Goals: 5    Presenting Problem: Sepsis with hypotension    Problem List  Principal Problem:    Sepsis with hypotension ([address]  Activ Fair    ACTIVITY TOLERANCE                         O2 WALK       AM-PAC '6-Clicks' INPATIENT SHORT FORM - BASIC MOBILITY  How much difficulty does the patient currently have. ..  -   Turning over in bed (including adjusting bedclothes, sheets and blankets)? pain, however pt is motivated to participate in therapy. Therapy limited today d/t orthostatic hypotension. Pt also demoed tachycardia with ambulation.  Pt would benefit from continued skilled therapy to address deficits due to long hospitalization in order

## 2021-05-05 NOTE — IMAGING NOTE
Pt is process of transfer to tertiary care center. 4 percutaneous drains placed for complex abd fluid collections. Outputs last 24 hrs; 16, 100, 25 and 35 respectively.   CPM.

## 2021-05-05 NOTE — PROGRESS NOTES
[address] [facility]  TEL: [phone]  FAX: [phone]    Andrea Olmedo Patient Status:  Inpatient    1965 MRN [id number]   [facility] 3NW-A Attending Katelyn Cruz MD   Hosp Day # 5 PCP Jeremie Cárdenas --   --   --   --  1.7*  --   --    *  --   --  134*  --  136  --   --    K  --  4.7   < > 3.8   < > 3.6 4.5 4.2   CL 97*  --   --  104  --  105  --   --    CO2 25.0  --   --  22.0  --  25.0  --   --    ALKPHO 98  --   --   --   --  69  --   --    A the PICC). Will dc Ancef/Flagyl and start meropenem for broader coverage for now. Follow temps and WBC. Continue to monitor abdominal exam closely- may need to consider repeat imaging pending progress. D/w RN and Dr. Shari Johnston.     Discussed with patient and RN

## 2021-05-06 RX ORDER — ACETAMINOPHEN 10 MG/ML
1000 INJECTION, SOLUTION INTRAVENOUS EVERY 6 HOURS
Status: DISCONTINUED | OUTPATIENT
Start: 2021-05-06 | End: 2021-05-07

## 2021-05-06 RX ORDER — NALOXONE HYDROCHLORIDE 0.4 MG/ML
0.08 INJECTION, SOLUTION INTRAMUSCULAR; INTRAVENOUS; SUBCUTANEOUS
Status: DISCONTINUED | OUTPATIENT
Start: 2021-05-06 | End: 2021-05-07

## 2021-05-06 RX ORDER — DIPHENHYDRAMINE HYDROCHLORIDE 50 MG/ML
12.5 INJECTION INTRAMUSCULAR; INTRAVENOUS EVERY 4 HOURS PRN
Status: DISCONTINUED | OUTPATIENT
Start: 2021-05-06 | End: 2021-05-07

## 2021-05-06 RX ORDER — ONDANSETRON 2 MG/ML
4 INJECTION INTRAMUSCULAR; INTRAVENOUS EVERY 6 HOURS PRN
Status: DISCONTINUED | OUTPATIENT
Start: 2021-05-06 | End: 2021-05-07

## 2021-05-06 RX ORDER — DIAZEPAM 5 MG/ML
2.5 INJECTION, SOLUTION INTRAMUSCULAR; INTRAVENOUS EVERY 6 HOURS PRN
Status: DISCONTINUED | OUTPATIENT
Start: 2021-05-06 | End: 2021-05-07

## 2021-05-06 RX ORDER — SULFAMETHOXAZOLE AND TRIMETHOPRIM 800; 160 MG/1; MG/1
1 TABLET ORAL
Refills: 0 | Status: SHIPPED | COMMUNITY
Start: 2021-05-06

## 2021-05-06 NOTE — PROGRESS NOTES
[address] [place] [address]  TEL: [phone]  FAX: [phone]    [facility] Patient Status:  Inpatient    1965 MRN [id number]   [facility] 3NW-A Attending Jaquan Savage MD   Hosp Day # 6 [facility] --  0.16* <0.15*   GFRAA 137   < >  --  163  --  191  --   --  225  --    GFRNAA 118   < >  --  141  --  165  --   --  195  --    CA 8.0*   < >  --  7.5*   < > 7.5*  --   --  7.4* 7.5*   ALB 1.7*  --   --   --   --  1.7*  --   --   --  1.4*   *   < cultures  -follow abd exam  -follow output from drains  -noted plans for transfer to tertiary care center    Discussed with patient and patient's wife. Will follow    Jann Sandoval.  Deejay Dunbar PA-C    ID ATTENDING ADDENDUM     Pt seen an examined independent

## 2021-05-06 NOTE — PROGRESS NOTES
[facility]  Progress Note    Alanismaría Gómezr Patient Status:  Inpatient    1965 MRN [id number]   [facility] 3NW-A Attending Liberty Hernadez MD   Hosp Day # 6 PCP Zion Lang MD     Subjective:   The patient is resting in bed and intestine without perforation or abscess without bleeding     Second degree hemorrhoids     Acute gastritis     Bursitis of left shoulder     Biceps tendonitis, left     Rotator cuff tendonitis, left     Eye muscle twitches     Acute nonintractable headach PM    Addendum:  Dr. Freda Boone    I have reviewed the above note and evaluation by the physician assistant. I agree with her physical exam and the data listed in the report, and I have made any relevant changes in editing her note.  I have personally

## 2021-05-06 NOTE — PROGRESS NOTES
[facility]    S: Patient has no new complaints. Still has abdominal pain. He says he was very cold this morning. He's on 1mcg/min levophed.     Meds:  • magnesium sulfate  4 g Intravenous Once   • meropenem  500 mg Intravenous Q8H   • hydro 05/02/21  1549 05/03/21  0551 05/03/21  0551 05/04/21  0721 05/05/21  0609 05/05/21  1120 05/06/21  0450   *  --    < > 136  --   --   --   --   --  136 136   K  --  4.7   < > 3.6   < > 4.2  --   --   --  3.5 3.9   CL 97*  --    < > 105  --   --   -- sleep  6. FEN  - NPO at this time  7. Proph  - subcutaneous heparin  8. Dispo  -full code  -ICU, pt is critically ill  -plan is for transfer to [facility] when bed available    > 35 min critical care time    Stephen Briseno M.D.   Pulmonary/Critical Care

## 2021-05-06 NOTE — CM/SW NOTE
Care Coordination  [facility] Transfer Note:  Reason for transfer:   Higher level of care requested by Dr. Omar Medina for further management of intra-abdominal abcess    Active acute Medical issue:  Intra-abdominal abscess, s/p 5 IR placed drains  I coordination of care and will follow. CARMEL Parker, CTL   Clinical Transitions Leader  [phone]  Addendum 5/6/2021 4:08 PM :  Pt has been downgraded now since pt is stable and off levo since 9am this morning.    Peterson: no floor beds yet, faxe

## 2021-05-06 NOTE — PAYOR COMM NOTE
--------------  5/5- 6 CONTINUED STAY REVIEW    Payor: [employer]  Subscriber #:  [id number]  Authorization Number: [id number]        5/5:    TRANSFERRED TO ICU  1.  Hypotension - concern for worsening intraabdominal sepsis  -IVF  -antibiotics as below  -va this morning.  He's on 1mcg/min levophed.      05/06/21  0615 05/06/21  0630 05/06/21  0645 05/06/21  0700   BP: 110/63 111/66 114/62 101/68   Pulse: 84 76 78 73   Resp: (!) 29 18 (!) 34 16   Temp:           TempSrc:           SpO2: 94% 97% 100%      Assess Nadine Hdz RN      HYDROmorphone HCl (DILAUDID) 1 MG/ML injection 0.8 mg     Date Action Dose Route User    5/6/2021 0856 Given 0.8 mg Intravenous Rigoberto Blackwell RN      ketorolac tromethamine (TORADOL) 30 MG/ML injection 30 mg     Date Action Dose Route U

## 2021-05-06 NOTE — PLAN OF CARE
Assumed pt care this morning. Alert. Awake. Uncontrolled pain. Pain service consulted, dilaudid pca started. Keep npo per surgery. Off levophed since this morning, b/p stable. Awaiting transfer to tertiary center, cleared to transfer out of ICU.  Family a

## 2021-05-06 NOTE — PROGRESS NOTES
[facility]  Report of Consultation    Vedajocelin Gilesar Patient Status:  Inpatient    1965 MRN [id number]   [facility] 4SW-A Attending Juan F Feliciano MD   [facility] Day # 6 PCP Lamar Mehta MD     Date of Admission:  2021  Date o years ago. His smoking use included cigarettes. He has a 2.00 pack-year smoking history. He has never used smokeless tobacco. He reports previous alcohol use of about 4.0 standard drinks of alcohol per week. He reports that he does not use drugs.     Allerg 30 mL, 30 mL, Oral, Daily PRN    bisacodyl (DULCOLAX) rectal suppository 10 mg, 10 mg, Rectal, Daily PRN    ondansetron HCl (ZOFRAN) injection 8 mg, 8 mg, Intravenous, Q6H PRN    Heparin Sodium (Porcine) 5000 UNIT/ML injection 5,000 Units, 5,000 Units, Sub disease)     Hypokalemia     Leukocytosis     SALLY (acute kidney injury) ([facility] [address]     Hyponatremia     Adjustment disorder with mixed anxiety and depressed mood     Pancreatic mass     Drug-induced constipation     C. difficile colitis     Clostridium difficil

## 2021-05-06 NOTE — CM/SW NOTE
Plan of care reviewed for care coordination.  Noted plans to transfer pt for higher level of care for further management of Intra-abdominal abscess -5 IR drains in place; s/p IR drain placement x2 on 5/1/2021 -cultures positive for E. Coli; Status post open

## 2021-05-06 NOTE — PLAN OF CARE
Received pt A&Ox4, follows commands. On RA, refused CPAP. Will monitor breathing overnight. On monitor, running NSR. Levo started to maintain SBP >90. SCDs on. Afebrile.  Increasing pitting edema in all extremities, APN notified, continuous NS changed to 50

## 2021-05-06 NOTE — IMAGING NOTE
24 hour outputs of drains in ml, with prior output in parentheses are:  LUQ 12 (15.5)  Mid abdomen 140 (100)  RLQ 20 & 30 (24.5 & 34.5)    Plan continued drainage, transfer to tertiary care pending.

## 2021-05-07 VITALS
SYSTOLIC BLOOD PRESSURE: 91 MMHG | OXYGEN SATURATION: 95 % | BODY MASS INDEX: 23.19 KG/M2 | WEIGHT: 153 LBS | RESPIRATION RATE: 12 BRPM | HEIGHT: 68 IN | TEMPERATURE: 97 F | HEART RATE: 67 BPM | DIASTOLIC BLOOD PRESSURE: 57 MMHG

## 2021-05-07 NOTE — PLAN OF CARE
Received pt A&Ox4. On RA. On monitor, running NSR. Afebrile. Bowel sounds hypoactive. Colostomy in place, intact. 5 jay drains, flushed and intact. PCA infusing per orders    Pt has bed at [facility] room [facility] 92..  Dr Smalls April notified, discharge papers marly

## 2021-05-20 NOTE — ADDENDUM NOTE
Addendum  created 05/20/21 3220 by Chente Diaz MD    Clinical Note Signed, Intraprocedure Blocks edited

## 2021-05-24 NOTE — DISCHARGE SUMMARY
[facility] HOSPITALIST  DISCHARGE SUMMARY     Maurie Dance Patient Status:  Inpatient    1965 MRN [id number]   [facility] 4SW-A Attending No att. providers found   [facility] Day # 6 PCP Lemuel Burks MD     Date of Admission: 2021 SOLU-cortef      Inject 2 mL (100 mg total) into the vein every 8 (eight) hours.    Refills: 0        CONTINUE taking these medications      Instructions Prescription details   Breo Ellipta 100-25 MCG/INH Aepb  Generic drug: Fluticasone Furoate-Vilanterol times a week. Every Mondays, Wednesdays, Saturdays Until 05/01/2021   Refills: 0     tamsulosin HCl 0.4 MG Caps  Commonly known as: FLOMAX      Take 1 capsule (0.4 mg total) by mouth nightly.  Until 05/01/2021   Quantity: 30 capsule  Refills: 0     vitamin nondistended. Positive bowel sounds. No rebound or guarding. Neurologic: No focal neurological deficits. Musculoskeletal: Moves all extremities. Extremities: No edema.   ----------------------------------------------------------------------------------

## 2021-07-06 ENCOUNTER — LAB REQUISITION (OUTPATIENT)
Dept: LAB | Facility: HOSPITAL | Age: 56
End: 2021-07-06
Payer: COMMERCIAL

## 2021-07-06 DIAGNOSIS — K85.92 ACUTE PANCREATITIS WITH INFECTED NECROSIS, UNSPECIFIED: ICD-10-CM

## 2021-07-06 DIAGNOSIS — K65.1 PERITONEAL ABSCESS (HCC): ICD-10-CM

## 2021-07-06 LAB
ALBUMIN SERPL-MCNC: 2.3 G/DL (ref 3.4–5)
ALBUMIN/GLOB SERPL: 0.7 {RATIO} (ref 1–2)
ALP LIVER SERPL-CCNC: 76 U/L
ALT SERPL-CCNC: 12 U/L
ANION GAP SERPL CALC-SCNC: 4 MMOL/L (ref 0–18)
AST SERPL-CCNC: 14 U/L (ref 15–37)
BASOPHILS # BLD: 0 X10(3) UL (ref 0–0.2)
BASOPHILS NFR BLD: 0 %
BILIRUB SERPL-MCNC: 0.2 MG/DL (ref 0.1–2)
BUN BLD-MCNC: 13 MG/DL (ref 7–18)
BUN/CREAT SERPL: 31 (ref 10–20)
CALCIUM BLD-MCNC: 8.8 MG/DL (ref 8.5–10.1)
CHLORIDE SERPL-SCNC: 107 MMOL/L (ref 98–112)
CO2 SERPL-SCNC: 27 MMOL/L (ref 21–32)
CREAT BLD-MCNC: 0.42 MG/DL
DEPRECATED RDW RBC AUTO: 68.1 FL (ref 35.1–46.3)
EOSINOPHIL # BLD: 0 X10(3) UL (ref 0–0.7)
EOSINOPHIL NFR BLD: 0 %
ERYTHROCYTE [DISTWIDTH] IN BLOOD BY AUTOMATED COUNT: 19 % (ref 11–15)
GLOBULIN PLAS-MCNC: 3.3 G/DL (ref 2.8–4.4)
GLUCOSE BLD-MCNC: 118 MG/DL (ref 70–99)
HCT VFR BLD AUTO: 31.4 %
HGB BLD-MCNC: 9.2 G/DL
LYMPHOCYTES NFR BLD: 0.94 X10(3) UL (ref 1–4)
LYMPHOCYTES NFR BLD: 4 %
M PROTEIN MFR SERPL ELPH: 5.6 G/DL (ref 6.4–8.2)
MCH RBC QN AUTO: 28.4 PG (ref 26–34)
MCHC RBC AUTO-ENTMCNC: 29.3 G/DL (ref 31–37)
MCV RBC AUTO: 96.9 FL
MONOCYTES # BLD: 1.4 X10(3) UL (ref 0.1–1)
MONOCYTES NFR BLD: 6 %
MORPHOLOGY: NORMAL
NEUTROPHILS # BLD AUTO: 19.9 X10 (3) UL (ref 1.5–7.7)
NEUTROPHILS NFR BLD: 90 %
NEUTS HYPERSEG # BLD: 21.06 X10(3) UL (ref 1.5–7.7)
OSMOLALITY SERPL CALC.SUM OF ELEC: 287 MOSM/KG (ref 275–295)
PLATELET # BLD AUTO: 278 10(3)UL (ref 150–450)
PLATELET MORPHOLOGY: NORMAL
POTASSIUM SERPL-SCNC: 4.1 MMOL/L (ref 3.5–5.1)
RBC # BLD AUTO: 3.24 X10(6)UL
SODIUM SERPL-SCNC: 138 MMOL/L (ref 136–145)
TOTAL CELLS COUNTED: 100
WBC # BLD AUTO: 23.4 X10(3) UL (ref 4–11)

## 2021-07-06 PROCEDURE — 80053 COMPREHEN METABOLIC PANEL: CPT | Performed by: INTERNAL MEDICINE

## 2021-07-06 PROCEDURE — 85007 BL SMEAR W/DIFF WBC COUNT: CPT | Performed by: INTERNAL MEDICINE

## 2021-07-06 PROCEDURE — 85027 COMPLETE CBC AUTOMATED: CPT | Performed by: INTERNAL MEDICINE

## 2021-07-14 DIAGNOSIS — E78.2 MIXED HYPERLIPIDEMIA: ICD-10-CM

## 2021-07-14 DIAGNOSIS — D86.9 SARCOIDOSIS, UNSPECIFIED: ICD-10-CM

## 2021-07-14 RX ORDER — FENOFIBRATE 200 MG/1
CAPSULE ORAL
Qty: 90 CAPSULE | Refills: 1 | Status: SHIPPED | OUTPATIENT
Start: 2021-07-14

## 2021-07-14 NOTE — TELEPHONE ENCOUNTER
Requested Prescriptions     Name from pharmacy: FENOFIBRATE 200 MG CAPSULE         Will file in chart as: FENOFIBRATE MICRONIZED 200 MG Oral Cap    Sig: TAKE 1 CAPSULE BY MOUTH EVERY DAY    Disp:  90 capsule    Refills:  1    Start: 7/14/2021    Class: Nor

## 2022-07-22 NOTE — PROGRESS NOTES
[address]  Report of GI Progress Note    Denise Sena Patient Status:  Inpatient    1965 MRN [id number]   [facility] 8NE-A Attending Joshua Moreno Day # 25 PCP Esau López MD     Date of Admission:   139 Sod-Tazobactam So (ZOSYN) 3.375 g in dextrose 5% 100 mL IVPB-ADDV, 3.375 g, Intravenous, Q8H  scopolamine (TRANSDERM-SCOP) patch, 1 patch, Transdermal, Q72H  Metoclopramide HCl (REGLAN) injection 10 mg, 10 mg, Intravenous, Q6H PRN  lisinopril tab 10 mg, 10 BUN 21 (H) 03/05/2021     03/05/2021    K 4.4 03/05/2021    CO2 26.0 03/05/2021    CA 7.7 (L) 03/05/2021    ALB 2.5 (L) 03/02/2021    ALKPHO 45 03/02/2021    TP 5.2 (L) 03/02/2021    AST 18 03/02/2021    ALT 31 03/02/2021    BILT 0.9 03/02/2021 ASAP  2. Will need to be done urgently, but patient ate lunch around Noon  3. Offered unsedated procedure, but patient not agreeable to this approach, and given his markedly distended transverse colon, is high risk for peforation  4.  Plan to proceed with G

## 2023-01-01 NOTE — PROGRESS NOTES
[facility]  Progress Note      Marlene Lee Patient Status:  Inpatient    1965 MRN [id number]   [facility] 3NW-A Attending Damian Decker MD   [facility] Day # 37 PCP Marisue Felty, MD     54year-old male with abdominal fluid No

## 2023-06-19 NOTE — PLAN OF CARE
Please see the attached refill request.   Pt A&Ox4, VSS on RA. Pt pain managed with scheduled pain medications. Pt ambulated x2 today. Once w/ PT, and once with RN and PCT following behind with chair. Pt HR got up to 110s, and recovered into the high 90s within 2 minutes.  Pt walked the length of t needed  - Assess and instruct to report SOB or any respiratory difficulty  - Respiratory Therapy support as indicated  - Manage/alleviate anxiety  - Monitor for signs/symptoms of CO2 retention  Outcome: Progressing     Problem: METABOLIC/FLUID AND ELECTROL falls.  - [place] fall precautions as indicated by assessment.  - Educate pt/family on patient safety including physical limitations  - Instruct pt to call for assistance with activity based on assessment  - Modify environment to reduce risk of injury  - factors contributing to decreased intake, treat as appropriate  - Assist with meals as needed  - Monitor I&O, WT and lab values  - Obtain nutritional consult as needed  - Optimize oral hygiene and moisture  - Encourage food from home; allow for food prefer

## 2023-08-23 NOTE — PROGRESS NOTES
HPI:   Alanis Rod is a 48year old male that presents for Patient presents with:  Cough: Started with a bad cold a couple weeks ago and he did get better but he is still having a bad cough that happens every so often  Medication Request        Past m noted in HPI    PHYSICAL EXAM:   /80   Pulse 76   Temp 98 °F (36.7 °C) (Temporal)   Resp 16   Ht 68\"   Wt 208 lb   SpO2 99%   BMI 31.63 kg/m²  Estimated body mass index is 31.63 kg/m² as calculated from the following:    Height as of this encounter: cholesterol. Risks, benefits, and alternatives of current treatment plan discussed in detail. Questions and concerns addressed. Red flags to RTC or ED reviewed. Patient (or parent) agrees to plan. No follow-ups on file. Jos Rogel pt complaining of etoh withdrawal, has not had a drink since last night, pt state she feels anxious

## 2024-01-12 NOTE — PLAN OF CARE
Pt received alert and oriented on RA. NSR. Afebrile. Slight hypotension, APN notified orders placed. Ileostomy bag intact and changed. Stoma pink. 3 EDYTA drains, dressings changed and intact. Output tan. Abdominal pain, see MAR for medication. Voided in ER. yes...

## 2024-08-30 NOTE — PROGRESS NOTES
03/07/21 9163   Clinical Encounter Type   Visited With Patient and family together   Routine Visit Introduction   Muslim Encounters   Muslim Needs Prayer   Patient Spiritual Encounters   Spiritual Assessment Completed Yes   Spiritual Needs Chaplai History of schizophrenia   Z86.59

## 2024-09-30 NOTE — TELEPHONE ENCOUNTER
4/30: Informed of results of STAT repeat labs from 4/29, WBC count 19.9.  VSS. Recommend repeat CT abd due to history and increase in white count. SNF ID APN informed and is advising blood cultures, IV Zosyn and CT abd.   Conferred with Dr. Alexx Perales, [facility] PCP, [Fatigue] : fatigue [Back Pain] : back pain [Headache] : headache [Fever] : no fever [Chest Pain] : no chest pain [Palpitations] : no palpitations [Shortness Of Breath] : no shortness of breath [Abdominal Pain] : no abdominal pain [Muscle Weakness] : no muscle weakness [Dizziness] : no dizziness [Fainting] : no fainting

## (undated) DEVICE — 3M(TM) MICROPORE TAPE DISPENSER 1535-2: Brand: 3M™ MICROPORE™

## (undated) DEVICE — CAUTERY PENCIL

## (undated) DEVICE — SINGLE USE BIOPSY VALVE MAJ-210: Brand: SINGLE USE BIOPSY VALVE (STERILE)

## (undated) DEVICE — 60 ML SYRINGE REGULAR TIP: Brand: MONOJECT

## (undated) DEVICE — Device: Brand: DEFENDO AIR/WATER/SUCTION AND BIOPSY VALVE

## (undated) DEVICE — BOWLS UTILITY 16OZ

## (undated) DEVICE — VIOLET BRAIDED (POLYGLACTIN 910), SYNTHETIC ABSORBABLE SUTURE: Brand: COATED VICRYL

## (undated) DEVICE — 1200CC GUARDIAN II: Brand: GUARDIAN

## (undated) DEVICE — BITE BLOCK ADULT DISP

## (undated) DEVICE — FILTERLINE NASAL ADULT O2/CO2

## (undated) DEVICE — ENDOSCOPY PACK UPPER: Brand: MEDLINE INDUSTRIES, INC.

## (undated) DEVICE — MEDI-VAC NON-CONDUCTIVE SUCTION TUBING: Brand: CARDINAL HEALTH

## (undated) DEVICE — ENDOSCOPY PACK - LOWER: Brand: MEDLINE INDUSTRIES, INC.

## (undated) DEVICE — GLOVE SURG TRIUMPH SZ 8

## (undated) DEVICE — SUTURE ETHILON 2-0 FS

## (undated) DEVICE — PROXIMATE SKIN STAPLERS (35 WIDE) CONTAINS 35 STAINLESS STEEL STAPLES (FIXED HEAD): Brand: PROXIMATE

## (undated) DEVICE — KENDALL SCD EXPRESS SLEEVES, KNEE LENGTH, MEDIUM: Brand: KENDALL SCD

## (undated) DEVICE — MASK ISOLATION

## (undated) DEVICE — SPECIMEN TRAP LUKI

## (undated) DEVICE — SOL  .9 1000ML BTL

## (undated) DEVICE — LIGHT HANDLE

## (undated) DEVICE — SINGLE USE ASPIRATION NEEDLE: Brand: SINGLE USE ASPIRATION NEEDLE

## (undated) DEVICE — SINGLE USE SUCTION VALVE MAJ-209: Brand: SINGLE USE SUCTION VALVE (STERILE)

## (undated) DEVICE — FLUIDGARD® 160 ANTI-FOG SURGICAL MASK WITH ANTI-GLARE SHIELD: Brand: PRECEPT ®

## (undated) DEVICE — AIRLIFE&#8482 MISTY MAX 10 NEBULIZER W 7 (2.1 M) CRUSH RESISTANT OXYGEN TUBING BAFFLED TEE ADAPTER, MOUTHPIECE: Brand: AIRLIFE

## (undated) DEVICE — GOWN,SIRUS,FABRIC-REINFORCED,X-LARGE: Brand: MEDLINE

## (undated) DEVICE — CONTOUR CURVED CUTTER STAPLER: Brand: CONTOUR

## (undated) DEVICE — LAPAROTOMY CDS: Brand: MEDLINE INDUSTRIES, INC.

## (undated) DEVICE — SURGICEL SNOW ABS 4X4

## (undated) DEVICE — SYRINGE 10ML SLIP TIP

## (undated) DEVICE — MASK ETCO2 PANORAMIC

## (undated) DEVICE — FORCEPS BX 100CM 1.8MM RJ STD

## (undated) DEVICE — TRAP SPECIMEN MUCOUS 70 CUBIC CENTIMETER POLYURETHANE STERILE NOT MADE WITH NATURAL RUBBER LATEX MEDICHOICE: Brand: MEDICHOICE

## (undated) DEVICE — PROXIMATE RELOADABLE LINEAR CUTTER WITH SAFETY LOCK-OUT, 75MM: Brand: PROXIMATE

## (undated) DEVICE — GLOVE SURG SENSICARE SZ 6-1/2

## (undated) DEVICE — 3M™ RED DOT™ MONITORING ELECTRODE WITH FOAM TAPE AND STICKY GEL, 50/BAG, 20/CASE, 72/PLT 2570: Brand: RED DOT™

## (undated) DEVICE — POOLE SUCTION HANDLE: Brand: CARDINAL HEALTH

## (undated) DEVICE — SUTURE PROLENE 2-0 SH

## (undated) DEVICE — SUTURE VICRYL 2-0

## (undated) DEVICE — DRAIN CHANNEL 19FR BLAKE

## (undated) DEVICE — MEDI-VAC SUCTION HANDLE REGULAR CAPACITY: Brand: CARDINAL HEALTH

## (undated) DEVICE — DRAPE EQUIPMENT INTRATEMP THER

## (undated) DEVICE — CONTAINER PREFILL FORMALIN 40

## (undated) DEVICE — PROXIMATE LINEAR CUTTER RELOAD, BLUE, 75MM: Brand: PROXIMATE

## (undated) DEVICE — CHLORAPREP 26ML APPLICATOR

## (undated) DEVICE — DRAIN RESERVOIR RELIAVAC 100CC

## (undated) DEVICE — STERILE POLYISOPRENE POWDER-FREE SURGICAL GLOVES: Brand: PROTEXIS

## (undated) DEVICE — COVER,MAYO STAND,STERILE: Brand: MEDLINE

## (undated) DEVICE — LAPAROTOMY SPONGE - RF AND X-RAY DETECTABLE PRE-WASHED: Brand: SITUATE

## (undated) DEVICE — LIGASURE IMPACT OPEN DEVICE

## (undated) DEVICE — TISSEEL SPRAY SET

## (undated) DEVICE — FORCEP RADIAL JAW 4

## (undated) NOTE — Clinical Note
Pt was contacted for TCM. Pt feels over all about the same. He still feels tired, body aching and now noticed burning when urinating as of yesterday. Pt is not sure why. Medication list reviewed.  Attempted to schedule the soonest appt but was not able to d

## (undated) NOTE — LETTER
Date: 6/19/2020    Patient Name: Martha Brito          To Whom it may concern:      Patient may return to work without restrictions on Monday 6/22/20.          Sincerely,    Erika Tijerina MD

## (undated) NOTE — IP AVS SNAPSHOT
Patient Demographics     Address  [address] [facility] [address] Phone  [phone] [facility])  [phone] (Work)  [phone] (Mobile) *Preferred* E-mail Address  [email or web address]      Emergency Contact(s)     Name Relation Home Work Mobile Commonly known as: LOFIBRA  Next dose due: 4/15      Take 200 mg by mouth every morning before breakfast.          fluticasone-salmeterol 250-50 MCG/DOSE Aepb  Commonly known as: Advair Diskus  Next dose due: 4/14 @hs      Inhale 1 puff into the lungs 2 (t -160 MG Tabs per tablet  Commonly known as: BACTRIM DS      Take 1 tablet by mouth 3 (three) times a week.   Stop taking on: May 2, 2021   Clemente Perez         tamsulosin HCl 0.4 MG Caps  Commonly known as: FLOMAX  Next dose due: 4/14 @ hs tab 1,000 mg 04/14/21 0933 Given      [id number] acetaminophen (TYLENOL EXTRA STRENGTH) tab 1,000 mg 04/14/21 1343 Given      [id number] celecoxib (CeleBREX) cap 100 mg 04/14/21 1343 Given      [id number] dexamethasone (DECADRON) tab 2 mg 04/14/21 0806 Given Hours      SCAN SLIDE [[id number]]  Resulted: 04/14/21 0938, Result status: Final result   Ordering provider: Shalonda Sanders MD  04/14/21 0708 Resulting lab: [facility])    Specimen Information    Type Source Collected On Glucose 79 70 - 99 mg/dL [facility])   Sodium 139 136 - 145 mmol/L — [place] [facility])   Potassium 3.7 3.5 - 5.1 mmol/L — [place] [facility])   Chloride 107 98 - 112 mmol/L — [facility])   CO2 25.0 21.0 - 32.0 mmol/L — [place] [facility])   Anion Gap admission for sarcoidosis flare complicated by Cdiff toxic megacolon and bowel perforation which required total colectomy with end ileostomy and surgical drain placement. He also had surgical drains x2 placed for pancreatic/abdominal abscesses.  He was disc on path   • ENDOBRONCHIAL ULTRASOUND (EBUS) N/A 2/16/2021    Performed by Susie Ritchie MD at [facility] ENDOSCOPY   • ESOPHAGOGASTRODUODENOSCOPY (EGD) with biopsies N/A 3/12/2021    Performed by Anil Diaz MD at [place] [address] 14 days. , Disp: 42 tablet, Rfl: 0  levofloxacin 500 MG Oral Tab, Take 1 tablet (500 mg total) by mouth daily for 14 days. , Disp: 14 tablet, Rfl: 0  Naloxone HCl 4 MG/0.1ML Nasal Liquid, 4 mg by Nasal route as needed.  If patient remains unresponsive, repeat extremities. Extremities: No edema or cyanosis. Integument: No rashes or lesions.    Psychiatric: tearful, frustrated       Diagnostic Data:      Labs:  Recent Labs   Lab 04/01/21  1141 04/05/21  2307   WBC 15.0* 18.6*   HGB 9.5* 10.5*   MCV 95.3 95.0   P ileostomy 3/7/2021 by Gen Sx  2. Has drain remaining from OR  4. Pancreatic mass/necrosis w/ abscess - previous cx with E. Coli/Enterococcus  1. S/p 2 drains by IR 3/25  2. Further management as above  5. Sarcoidosis   1.  PTA decadron (will convert to IV w further care and evaluation on 2/10/2021 with complaints of chest pain. The patient has a history of sarcoidosis. During that hospitalization the patient developed C. difficile colitis and toxic megacolon.   The patient underwent subtotal colectomy with c Sarcoidosis    • Shortness of breath     no oxygen   • Sleep apnea    • Weight loss    • Wheezing      Past Surgical History:   Procedure Laterality Date   • BIOPSY/REMOVAL, LYMPH NODE(S) Bilateral 2013    Lymph node removal (neck)   • BRONCHOSCOPY N/A 2/1 Nightly PRN  •  PEG 3350 (MIRALAX) powder packet 17 g, 17 g, Oral, Daily PRN  •  magnesium hydroxide (MILK OF MAGNESIA) 400 MG/5ML suspension 30 mL, 30 mL, Oral, Daily PRN  •  bisacodyl (DULCOLAX) rectal suppository 10 mg, 10 mg, Rectal, Daily PRN  •  Flee Negative for pruritus and rash  Musculoskeletal:  Negative for bone/joint symptoms  Neurological:  Negative for gait disturbance  Psychiatric:  Negative for inappropriate interaction and psychiatric symptoms  Respiratory:  Negative for cough, dyspnea and w 0630   GLU  --  132*  --  98   BUN  --  10  --  7   CREATSERUM 0.52* 0.62*  --  0.40*   GFRAA 139 129  --  155   GFRNAA 120 112  --  134   CA  --  8.0*  --  7.9*   ALB  --  2.1*  --   --    NA  --  132*  --  134*   K  --  5.1 4.4 4.0   CL  --  103  --  103 within the urinary bladder may be iatrogenic, correlate clinically.  No visible focal wall thickening, lesion, or calculus.     PELVIC NODES:  No adenopathy.     PELVIC ORGANS:  Suprapubic catheter is present within a pelvic free fluid collection which has Bowel perforation ([address]     Acute pancreatitis     Intraabdominal fluid collection     Left upper quadrant abdominal abscess (HCC)[MG.1]      Kym Frank is a 80-year-old male with a history of sarcoidosis who is status post subtotal colectomy with i However there was a concern for possible increase in size of the intra-abdominal fluid collections.   A repeat CT scan with now oral contrast was performed and was negative for any extravasation of enteric contrast.    There continues to be brown cloudy flu exist for this encounter.         Physical Therapy Notes (last 72 hours) (Notes from 4/11/2021  5:04 PM through 4/14/2021  5:04 PM)      Physical Therapy Note signed by Kirsty Kraus PT at 4/12/2021 10:43 AM  Version 1 of 1    Author: Chuck Matthews Gastritis, no hpylori on path   • ENDOBRONCHIAL ULTRASOUND (EBUS) N/A 2/16/2021    Performed by Garland Medina MD at [facility] ENDOSCOPY   • ESOPHAGOGASTRODUODENOSCOPY (EGD) with biopsies N/A 3/12/2021    Performed by Doug Layne MD at [facility] ENDOSCOPY   • FL Celi[TF.2]       AM-PAC Score:[TF.1]  Raw Score: 21   Approx Degree of Impairment: 28.97%   Standardized Score (AM-PAC Scale): 50.25   CMS Modifier (G-Code): CJ[TF.2]    FUNCTIONAL ABILITY STATUS  Gait Assessment[TF.1]   Gait Assistance: Supervision  Dis 1 assist RW     DISCHARGE RECOMMENDATIONS[TF.1]  PT Discharge Recommendations: Sub-acute rehabilitation[TF.2]    PLAN[TF.1]  PT Treatment Plan: Bed mobility; Endurance; Energy conservation;Patient education; Family education;Gait training;Range of motion;Stre Problem List[KK. 1]  Principal Problem:    Intraabdominal fluid collection  Active Problems:    Leukocytosis    Clostridium difficile infection    Hypomagnesemia    Orthostatic hypotension    Sinus tachycardia[KK. 2]      Past Medical Trg [address] 95. 1]  Past Med they said I will be here  Few more days. \"    Patient self-stated goal is to go home    OBJECTIVE[KK.1]  Precautions: Drain(s); Bed/chair alarm[KK. 2]    WEIGHT BEARING RESTRICTION[KK. 1]  Weight Bearing Restriction: None[KK. 2]                PAIN ASSESSMENT[ within reach; All patient questions and concerns addressed;SCDs in [address]. 2]    ASSESSMENT   Patient is a[KK. 3 54year old[KK. 2] male admitted 4/5/2021 for Abdominal Fluid Collection.   In OT session 2 of 5 patient is progressing with the following impair No notes of this type exist for this encounter.      Immunizations     Name Date      Depo-Medrol 40mg Inj 05/13/19     INFLUENZA 10/07/20     INFLUENZA 09/18/19     INFLUENZA 09/23/18     INFLUENZA 10/08/17     INFLUENZA 12/20/16     INFLUENZA 10/19/15

## (undated) NOTE — LETTER
[facility] [address], [address]  Consent for Procedure/Sedation    Date: 2/16/2021    Time: 0600      1. I authorize the performance upon Sienna Jimenez the following:  BRONCHOSCOPY WITH endobronchial ultrasound     2.  I Sherri Bentley Printed: 2/15/2021   10:45 PM  Patient Name: Ravin Del Cid        : 1965       Medical Record #: [id number]

## (undated) NOTE — IP AVS SNAPSHOT
Patient Demographics     Address  [address] [place] [address]  Judy Chan [address] Phone  [phone] [facility])  [phone] (Work)  [phone] (Mobile) *Preferred* E-mail Address  [email or web address]      Emergency Contact(s)     Name Relation Home Work Mobile mg total) by mouth 3 (three) times daily. Junior Bullock MD   [    ]   [    ]   [    ]   [    ]     hydrocortisone Na succinate  MG Solr  Commonly known as: SOLU-cortef      Inject 2 mL (100 mg total) into the vein every 8 (eight) hours.    Sol Hernandez doctor about these medications       Instructions Authorizing Provider Morning Afternoon Evening As Needed   nystatin [id number] UNIT/ML Susp  Commonly known as: MYCOSTATIN  Ask about: Should I take this medication?       Take 5 mL by mouth 4 (four) times daily Given      [id number] magnesium sulfate IVPB premix 4 g 05/06/21 0631 New Bag      [id number] nystatin (MYCOSTATIN) suspension 500,000 Units 05/06/21 2007 Given      [id number] tamsulosin HCl (FLOMAX) cap 0.4 mg 05/06/21 2000 Given            LEFT LOWER ABDOM Collected On   Blood — 05/06/21 0450            PHOSPHORUS [[id number]] (Normal)  Resulted: 05/06/21 0525, Result status: Final result   Ordering provider: CARMEL Faulkner  05/05/21 2300 Resulting lab: [address] [facility])    Spe calculate GFR.  Creatinine is <0.15 mg/dL   AST 9 15 - 37 U/L L [facility])   ALT <6 16 - 61 U/L L [facility])   Alkaline Phosphatase 71 45 - 117 U/L — [facility])   Bilirubin, Total 0.2 0.1 - 2.0 mg/dL — [facility])   Total Protein 4.3 Aerobic Bacterial Culture Release Upon Ordering [[id number]]  (Abnormal)  (Susceptibility) Collected: 05/01/21 1353    Order Status: Completed Lab Status: Final result Updated: 05/03/21 0821    Specimen: Abscess from Abdomen      Aerobic Culture Result 3+ g drainage of pus from EDYTA drains. No cough or dyspnea. No vomiting. No dysuria.     Past Medical History:  Past Medical History:   Diagnosis Date   • Calculus of kidney    • Chronic cough    • Decorative tattoo    • Esophageal reflux    • Essential hyperte Rfl: 0  OxyCODONE HCl IR 20 MG Oral Tab, Take 1 tablet (20 mg total) by mouth every 4 (four) hours. , Disp: 18 tablet, Rfl: 0  celecoxib 100 MG Oral Cap, Take 1 capsule (100 mg total) by mouth 2 (two) times daily. , Disp: 14 capsule, Rfl: 0  magnesium oxide auscultation bilaterally. No wheezes. No rhonchi. Cardiovascular: S1, S2. Regular rate and rhythm. No murmurs, rubs or gallops. Equal pulses. Chest and Back: No tenderness or deformity. Abdomen: Soft, nontender, nondistended. Positive bowel sounds.  No colectomy  3. Hx cdiff toxic megacolon s/p colectomy/ileostomy 3/7/21  4. Hx necrotizing pancreatitis  5. Sarcoidosis-consider stress dosed decadron if BP doesn't normalize with fluids; allergic to prednisone but was tolerating decadron in past  6.  JEANETTE-per open total abdominal colectomy with end ileostomy by Dr. aMrycarmen Higgins for perforated colon. During the patient's postoperative period, he developed acute pancreatitis with pancreatic necrosis. He developed multiple fluid collections.   On 3/25/2021, patient unde solution 250 mL, 250 mL, Intravenous, Once  glucose (DEX4) oral liquid 15 g, 15 g, Oral, Q15 Min PRN   Or  Glucose-Vitamin C (DEX-4) chewable tab 4 tablet, 4 tablet, Oral, Q15 Min PRN   Or  dextrose 50 % injection 50 mL, 50 mL, Intravenous, Q15 Min PRN   O [COMPLETED] 0.9% NaCl infusion, 1,000 mL, Intravenous, Once  gabapentin (NEURONTIN) cap 100 mg, 100 mg, Oral, TID  Pantoprazole Sodium (PROTONIX) EC tab 20 mg, 20 mg, Oral, QAM AC  Sulfamethoxazole-TMP DS (BACTRIM DS) 800-160 MG per tab 1 tablet, 1 tablet, Social History:  Social History    Socioeconomic History      Marital status:       Spouse name: Not on file      Number of children: Not on file      Years of education: Not on file      Highest education level: Not on file    Tobacco Use equal and round  ENMT: external inspection of ears and nose within normal limits.  Normal functional hearing  Neck: supple, symmetrical, no thyromegaly appreciated  Lymph: no cervical and no axillary lymphadenopathy  Lungs: Non labored on RA, no wheezing ap Sepsis with hypotension ([facility] [address]     Intra-abdominal abscess  IR drain placement x2 (5/1/2021) - cultures positive for e coli  Status post open total abdominal colectomy with end ileostomy (3/7/2021)  Leukocytosis, resolved  Hypotension, resolved  History of hours) (Notes from 5/3/2021  9:43 PM through 5/6/2021  9:43 PM)      Physical Therapy Note signed by Coleman Hernandez PTA at 5/5/2021 11:18 AM  Version 2 of 2    Author: Coleman Hernandez PTA Service: Rehab Author Type: Physical Therapy Assistant Established Goals: 5    Presenting Problem: Sepsis with hypotension    Problem List  Principal Problem:    Sepsis with hypotension ([address]  Active Problems:    Intra-abdominal abscess ([address]    Anemia      Past Medical History  Past Medical History:   Diagnos MOBILITY  How much difficulty does the patient currently have. ..  -   Turning over in bed (including adjusting bedclothes, sheets and blankets)?: A Little   -   Sitting down on and standing up from a chair with arms (e.g., wheelchair, bedside commode, etc. limited today d/t[AA.1] orthostatic hypotension[AA.2]. [AA.1] Pt also demoed tachycardia with ambulation. [AA.3] Pt would benefit from continued skilled therapy to address deficits due to long hospitalization in order to return to prior level of function. Pneumovax 23 09/28/12       Multidisciplinary Problems     Active Goals        Problem: Patient/Family Goals    Goal Priority Disciplines Outcome Interventions   Patient/Family Long Term Goal     Interdisciplinary Progressing    Description: Patient's Long

## (undated) NOTE — ED AVS SNAPSHOT
Neptali Vargas   MRN: [id number]    Department:  [facility] Emergency Department   Date of Visit:  6/14/2019           Disclosure     Insurance plans vary and the physician(s) referred by the ER may not be covered by your plan.  Please contact yo tell this physician (or your personal doctor if your instructions are to return to your personal doctor) about any new or lasting problems. The primary care or specialist physician will see patients referred from the [facility] Emergency Department.  Arthor Bernheim

## (undated) NOTE — LETTER
[facility], [address] [facility] , [address]  [facility] [address]  [phone]                    April 18, 2018        Angelica Julien  [address] [place] [address]      Dear Sary Collins:     Our records

## (undated) NOTE — LETTER
Date: 5/18/2020    Patient Name: Brianna Ramirez          To Whom it may concern:       This letter has been written at the patient's request. The above patient was seen at the [facility] for treatment of a medical condition.     This patien

## (undated) NOTE — LETTER
Date: 5/4/2020    Patient Name: Isaac Stuart          To Whom it may concern:     Mr. Chau Bright should be excused from work from 5/4/20 - 5/18/20 due to respiratory illness and lung sarcoidosis.     Next appt 5/18/20.              Sincerely,    Kraig Lombard

## (undated) NOTE — IP AVS SNAPSHOT
[address]            (For Outpatient Use Only) Initial Admit Date: 2/10/2021   Inpt/Obs Admit Date: Inpt: 2/12/21 / Obs: 02/10/21   Discharge Date:    Nikolay Kirby:  [de-identified]   MRN: [de-identified]   CSN: [id number]   CEID: [id number] Name:  Geovany Szymanski :    Subscriber ID:  Pt Rel to Subscriber:    Hospital Account Financial Class: Maria Elena Freire [facility]    2021

## (undated) NOTE — LETTER
[facility], [address] [facility] , [address]  [facility] [address]  [phone]            January 12, 2018      Summer Gilliam  [address] [place] [address]      Dear Gaby Dubose:     Our records indicat

## (undated) NOTE — IP AVS SNAPSHOT
Patient Demographics     Address  [address] [place] [address]  Opal Boswell [address] Phone  [phone] [facility])  [phone] (Work)  [phone] (Mobile) *Preferred* E-mail Address  [email or web address]      Emergency Contact(s)     Name Relation Home Work Mobile Ward Chavez MD. Schedule an appointment as soon as possible for a visit in 1 week.     Specialties: Family Medicine, IP Consult to Primary Care, Wound & Ostomy Care  Contact information:  [address], [address] other nostril 2-5 minutes after first dose. Gage Lara, DO         Omeprazole 40 MG Cpdr  Next dose due: Tomorrow       Take 40 mg by mouth daily.           OxyCODONE HCl IR 10 MG Tabs  Commonly known as: ROXICODONE  Next dose due: Anytime after IVPB-MBP 03/31/21 2129 New Bag      [id number] Ampicillin-Sulbactam Sodium (UNASYN) 3 g in sodium chloride 0.9% 100 mL IVPB-MBP 04/01/21 0540 New Bag      [id number] OxyCODONE HCl IR (ROXICODONE) immediate release tab 10 mg 03/31/21 2105 Given      [id number] 105 Filed at 04/01/2021 1253   Resp  18 Filed at 04/01/2021 1620   Temp  97.8 °F (36.6 °C) Filed at 04/01/2021 1620   SpO2  98 % Filed at 04/01/2021 1620      Patient's Most Recent Weight       Most Recent Value   Patient Weight  74.1 kg (163 lb 6.5 oz) -American 139 >=60 — [facility])            Testing Performed By     Libertad Navarro Name Director Address Valid Date Range    [address] [facility]) [facility] Benedict Gonzalez  MICHELLE [address]. Final result                 Please view results for these tests on the individual orders.     AFB CULTURE(P) Once [[id number]] Collected: 02/16/21 0820    Order Status: Completed Lab Status: Final result Updated: 03/30/21 1500    S Sulbactam >=32  Resistant       Cefazolin <=4  Sensitive       Ciprofloxacin <=0.25  Sensitive       Gentamicin >=16  Resistant       Levofloxacin <=0.12  Sensitive       Meropenem <=0.25  Sensitive       Piperacillin + Tazobactam 32  Intermediate       To BAL,SMITHA      Fungus Culture Result No Fungus Isolated at 4 weeks    FUNGUS CULTURE AND SMEAR Release Upon Ordering [[id number]] Collected: 02/16/21 0820    Order Status: Completed Lab Status: Final result Updated: 03/16/21 1500    Specimen: Fine needle aspi 5 Days    Blood Culture TONI X 2 [[id number]] Collected: 02/27/21 0926    Order Status: Completed Lab Status: Final result Updated: 03/04/21 1000    Specimen: Blood,peripheral      Blood Culture Result No Growth 5 Days    LEUKEMIA\LYMPHOMA NON BLD (P) Once Specimen: Body fluid, unspecified from BAL,SMITHA      Bronc Quant Culture Result <10,000 cfu/ml Normal Respiratory Duyen isolated     Bronc Quant Smear This is a cytocentrifuged smear.       1+ WBCs seen      No organisms seen    Path Comment Bronchial Wash O Completed Lab Status: Final result Updated: 02/16/21 1042    Specimen:  Body fluid, unspecified from BAL,SMITHA      Neutrophils Bronchial 66 %      Lymphocyte Bronchial 14 %      Mono/Macrophage Bronchial 19 %      Eosinophil Bronchial 1 %      Basophil Bronc 11/16/1965 MRN [id number]   [facility] 8NE-A Attending Trey Bains MD   Hosp Day # 0 PCP Meme Edwards MD     Chief Complaint: Chest pain    History of Present Illness: Jim Gallegos is a 54year old male with history of hypertensio of alcohol per week. He reports that he does not use drugs.     Family History:   Family History   Problem Relation Age of Onset   • Heart Disorder Father    • Stroke Father    • Other (Other) Father         cva   • Diabetes Mother    • Heart Disorder Sammie Cardiovascular: S1, S2. Regular rate and rhythm. No murmurs, rubs or gallops. Equal pulses. Chest and Back: No tenderness or deformity. Abdomen: Soft, nontender, nondistended. Positive bowel sounds. No rebound, guarding or organomegaly.   Neurologic: No PM               H&P signed by Paolo Castillo DO at 2/10/2021 11:23 PM  Version 1 of 2    Author: Paolo Castillo DO Service: Hospitalist Author Type: Physician    Filed: 2/10/2021 11:23 PM Date of Service: 2/10/2021 11:14 PM Statu adeonomas, diverticulosis. repeat 2018   • EGD  11/2015     schatzki's ring, non obstructive. Erosive esophagitis, LA grad 1 (no barretts on path).  Gastritis, no hpylori on path   • NEEDLE BIOPSY LIVER     • OTHER      biopsies   • OTHER SURGICAL HISTORY completed. Pertinent positives and negatives noted in the HPI.     Physical Exam:    /81 (BP Location: Left arm)   Pulse 93   Temp 98.1 °F (36.7 °C) (Oral)   Resp 19   Ht 5' 8\" (1.727 m)   Wt 204 lb 6.4 oz (92.7 kg)   SpO2 96%   BMI 31.08 kg/m² Quality:  · DVT Prophylaxis: Lovenox  · CODE status: Full  · Hyde: None  · If COVID testing is negative, may discontinue isolation: Yes    Plan of care discussed with pt at bedside.     Paulo Dickson DO  2/10/2021[RZ.1]                      El that there were no acute surgical issues at that time and repeat imaging was planned for 4-6 weeks.  MRCP on 3/1 showed inflammatory changes adjacent to pancreatic head and uncinate process extending in a caudal direction with a small amount of mesenteric e Past Surgical History:   Procedure Laterality Date   • BIOPSY/REMOVAL, LYMPH NODE(S) Bilateral 2013    Lymph node removal (neck)   • BRONCHOSCOPY N/A 2/16/2021    Performed by Susie Ritchie MD at [address] [place] [address] Road HCl IR (ROXICODONE) immediate release tab 10 mg, 10 mg, Oral, Q4H PRN  •  dexamethasone (DECADRON) tab 4 mg, 4 mg, Oral, Daily with food  •  mycophenolate mofetil (CELLCEPT) cap 750 mg, 750 mg, Oral, 2 times per day  •  Pantoprazole Sodium (PROTONIX) EC ta Oral, QAM AC  •  atorvastatin (LIPITOR) tab 20 mg, 20 mg, Oral, Nightly  •  melatonin tab 3 mg, 3 mg, Oral, Nightly PRN  •  magnesium hydroxide (MILK OF MAGNESIA) 400 MG/5ML suspension 30 mL, 30 mL, Oral, Daily PRN    Review of Systems:    Completed.  See p MCV  --  90.3 92.5   MCH  --  30.4 30.0   MCHC  --  33.6 32.4   RDW  --  16.7* 16.7*   NEPRELIM  --  14.06* 14.36*   WBC  --  18.1* 18.6*   .0 250.0 266.0   NEUT  --  92 54   LYMPH  --  4 4   MON  --  3 6   EOS  --  0 0     Recent Labs   Lab 03/22/2 STATED HISTORY: (As transcribed by Technologist)  Patient shares that he is having abdominal distention with the greatest amount of pain in the epigastric region.     FINDINGS:  BOWEL GAS PATTERN:  There is extensive gaseous distension/dilatation of the tra node measures 1.6 x 1.5 cm was 3.0 x 2.5 cm. VILMA:  No mass or adenopathy. CARDIAC:  No enlargement, pericardial thickening, or significant calcification. PLEURA:  No mass or effusion. CHEST WALL:  No mass or axillary adenopathy.   LIMITED ABDOMEN:  Limit small bowel obstruction. The pancreatic head mass identified on the recent contrast CT of the abdomen is better identified on that exam, but is probably present on series 2, image 53 approximately measuring 2.7 x 2.0 cm.    Peripancreatic stranding is seen diaphragm to the pubic symphysis with non-ionic intravenous contrast material. Post contrast coronal MPR imaging was performed. Dose reduction techniques were used.  Dose information is transmitted to the [facility] of Radiology) Aleisha Miller [address] lateral approach surgical drain which terminates near the gastrohepatic ligament. ABDOMINAL WALL:  Unremarkable. PELVIC ORGANS:  Small air-fluid level within the bladder likely relates to recent instrumentation. LYMPH NODES:  Unremarkable.  BONES:  Letta Montserratian intravenous contrast material. Post contrast coronal MPR imaging was performed. Dose reduction techniques were used.  Dose information is transmitted to the [facility] ([address] [place] [address] of Radiology) Tashia. Paul Diaz  ([facility]) which includes the Dose best seen image number 49 of series 301 measuring 12 mm. ABDOMINAL WALL:  No mass or hernia. URINARY BLADDER:  Decompressed urinary bladder with a Hyde catheter. PELVIC NODES:  No adenopathy. PELVIC ORGANS:  No visible mass.   Pelvic organs appropriate ([facility] of Radiology) NRDR ([address]) which includes the Dose Index Registry. PATIENT STATED HISTORY:(As transcribed by Technologist)  Patient with abdomen distension and nausea.    CONTRAST USED:  100cc of Omnipaque 350 distal duodenal/proximal jejunal ulcer and/or adenocarcinoma of the pancreatic head with surrounding inflammatory changes from pancreatitis. The inflammatory change demonstrates flattening of the superior mesenteric vein which is patent.   ERCP with EUS is bile duct measures 4 mm. No biliary ductal dilatation. Gallbladder is absent. PANCREAS:  Inflammatory changes involving the pancreatic head and uncinate process is noted.   Previously noted nodule mass on prior CT examination corresponds with a region lalito Finalized by (CST): Nova Ferrer MD on 3/01/2021 at 7:39 PM       CARD ECHO 2D DOPPLER (CPT=93306)    Result Date: 2/23/2021                                                     *[address] relaxation - grade 1 diastolic dysfunction. 2. Right ventricle: The cavity size was normal. Systolic function was    normal. Systolic pressure was not estimated.  3. Left atrium: The left atrial volume was normal. 4. Aorta: Ascending aorta diameter was 3.7c present. Aorta:  Ascending aorta diameter was 3.7cm. Aortic root was 3.9cm at the sinus of Valsalva. Pulmonary artery:   Not well visualized. Systolic pressure could not be accurately estimated.  ------------------------------------------------------------ (H)  ana values outside specified reference range. ---------------------------------------------------------------------------- Prepared and electronically signed by Stanley rAt M.D.,F.A.C.C. 02/23/2021 19:11     XR CHEST AP PORTABLE  (CPT=71045)    Res again most pronounced in the lower lungs which could represent worsening edema or worsening infiltrates. Continued follow-up is suggested. A preliminary report was provided by the [facility] teleradiology service.   Dictated by (CST): Chio Gee MD on DO Isabel on 3/07/2021 at 12:20 PM     Finalized by (CST): Stephane Meyer DO on 3/07/2021 at 12:23 PM       IR CENTRAL VENOUS ACCESS    Result Date: 3/15/2021  PROCEDURE:  IR CENTRAL LINE (TLC) PLACEMENT  COMPARISON:  None.   INDICATIONS:  Patient with necro Finalized by (CST): Vern Salinas MD on 3/15/2021 at 2:02 PM            ASSESSMENT:  1. Intra abd abscesses[IC.1] after colonic perforation[IC.4]  2.  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, s/p colectomy on 3/7[IC.1]- there are reports of C Assistant    Filed: 3/31/2021 11:56 AM Date of Service: 3/31/2021 11:45 AM Status: Signed    : Nidia Leal PTA (Physical Therapy Assistant)        PHYSICAL THERAPY TREATMENT NOTE - INPATIENT    Room Number: 325/325-A     Session: 15  Number RDI 26 SaO2 clay 82 % CPAP 13 Premier   • Pain in joints    • Sarcoidosis    • Shortness of breath     no oxygen   • Sleep apnea    • Weight loss    • Wheezing        Past Surgical History  Past Surgical History:   Procedure Laterality Date   • BIOPSY/REM Standing: Fair  Dynamic [address]                        O2 WALK                    AM-PAC '6-Clicks' INPATIENT SHORT FORM - BASIC MOBILITY  How much difficulty does the patient currently have. ..  -   Turning over in bed (inclu during gait training sans AD. PTA pt was independent with all functional mobility including ambulation and stair negotiation.  Pt tachycardic with mobility and limited by BLE weakness and decreased endurance and is unable to safely attempt stairs this sessi Therapist)    Related Notes: Original Note by Saeid Brannon OT (Occupational Therapist) filed at 3/30/2021  2:12 PM       OCCUPATIONAL THERAPY TREATMENT NOTE - INPATIENT     Room Number: 325/325-A  Session:  7[DO.1]/7, additional 5 sessions added 3/30 node removal (neck)   • BRONCHOSCOPY N/A 2/16/2021    Performed by Fabiano Bertrand MD at [address] [facility] N/A 6/1/2020    Performed by Franny Nj MD at [address] [facility] ENDOSCOPY   • COLONOSCOPY     • COLONOSCOPY  11/2015 such as brushing teeth?: A Little  -   Eating meals?: A Little[DO. 2]    AM-PAC Score:[DO.1]  Score: 17  Approx Degree of Impairment: 50.11%  Standardized Score (AM-PAC Scale): 37.26  CMS Modifier (G-Code): CK[DO.2]    FUNCTIONAL TRANSFER ASSESSMENT[DO.1] Recommendations: TBD[DO.2]    PLAN[DO.1]  OT Treatment Plan: Energy conservation/work simplification techniques;Balance activities; ADL training;Functional transfer training;UE strengthening/ROM; Endurance training;Patient/Family education;Patient/Family tra related to current admission: Patient is a 54y/o male admitted 2/10/21 from home w/ c/o pleuritic chest pain.  Workup c/w possible sarcoidosis flare up triggering pleurisy.  Hospital course c/b abdominal pain s/p megacolon s/p perforation 3/7/21, s/p open path   • ENDOBRONCHIAL ULTRASOUND (EBUS) N/A 2/16/2021    Performed by Cherre Bamberger, MD at [facility] ENDOSCOPY   • ESOPHAGOGASTRODUODENOSCOPY (EGD) with biopsies N/A 3/12/2021    Performed by Pantera Bauer MD at [address] [place] [address] already completed this AM and declines need for toilet. Agreeable to complete therex seated EOB. Pt w/ good carryover for log rolling technique, completed with supervision.  Demo's good sitting balance EOB and tolerates sitting ~15mins for BUE/BLE therex t perform toileting with supervision and with adaptive equipment PRN.  -- 3/19 utilizes urinal with SB, continue for full toileting tasks     Functional Transfer Goals:  Patient will transfer from sit to supine:  with supervision -met 3/18  Patient will trans

## (undated) NOTE — LETTER
[facility] [address], [address] [place] [address]    Consent for Anesthesia   1.   INiraj agree to be cared for by a physician anesthesiologist alone and/or with a nurse anesthetist, who is specially trained to monitor me and give me allergic reactions to medications, injury to my airway, heart, lungs, vision, nerves, or muscles and in extremely rare instances death. 5. My doctor has explained to me other choices available to me for my care (alternatives).   6. Pregnant Patients (“epid Printed: 3/11/2021 at 1:39 PM    Medical Record #: [id number]                                            Page 1 of 1

## (undated) NOTE — LETTER
Mikaela Grimaldo [address]  [address] [facility] [address], [address] [facility]  Authorization for Surgical Operation and Procedure       Date:___________                                                                                                         Time:__________ not all, of the potential risks that can occur: fever and allergic reactions, hemolytic reactions, transmission of diseases such as Hepatitis, AIDS and Cytomegalovirus (CMV) and fluid overload.   In the event that I wish to have an autologous transfusion of attending physician will determine when the applicable recovery period ends for purposes of reinstating the DNAR order.   10. Patients having a sterilization procedure: I understand that if the procedure is successful the results will be permanent and it wi anesthesiologist (anesthesia doctor) to give me medicine and do additional procedures as necessary.  Some examples are: Starting or using an “IV” to give me medicine, fluids or blood during my procedure, and having a breathing tube placed to help me breathe “epidural”, & “nerve blocks”): I understand that rare but potential complications include headache, bleeding, infection, seizure, irregular heart rhythms, and nerve injury.     I can change my mind about having anesthesia services at any time before I get

## (undated) NOTE — ED AVS SNAPSHOT
Vanessa Beck   MRN: [id number]    Department:  THE [facility] Emergency Department in [place]   Date of Visit:  12/15/2018           Disclosure     Insurance plans vary and the physician(s) referred by the ER may not be covered by your plan.  Please contac tell this physician (or your personal doctor if your instructions are to return to your personal doctor) about any new or lasting problems. The primary care or specialist physician will see patients referred from the [facility] Emergency Department.  Zulay Shetty

## (undated) NOTE — IP AVS SNAPSHOT
[address]            (For Outpatient Use Only) Initial Admit Date: 4/5/2021   Inpt/Obs Admit Date: Inpt: 4/6/21 / Obs: N/A   Discharge Date:    Marsha Wiggins:  [de-identified]   MRN: [de-identified]   CSN: [id number]   CEID: [id number] Group Number:  Insurance Type:    Subscriber Name:  Subscriber :    Subscriber ID:  Pt Rel to Subscriber:    Hospital Account Financial Class: [facility]    2021

## (undated) NOTE — LETTER
Date: 4/13/2020    Patient Name: Ravin Del Cid          To Whom it may concern:    Mr. Molly Durham should be excused from work from 4/13/20 - 4/20/20 due to respiratory illness and lung sarcoidosis exacerbation. Next appt 4/20/20.          Sincerely,

## (undated) NOTE — LETTER
[facility] [address], [address]  Consent for Procedure/Sedation    Date: 3/14/21    Time: 0921      1. I authorize the performance upon Jadyn Faustin the following:  Triple Lumen Catheter Insertion     2.  I authorize Dr. Celeste Jay 11/16/1965       Medical Record #: [id number]

## (undated) NOTE — LETTER
Mikaela Grimaldo [address], [address] [facility]  Authorization for Surgical Operation and Procedure   Date:___________                                                                                            Time:__________  1.  I hereby aut that can occur: fever and allergic reactions, hemolytic reactions, transmission of diseases such as Hepatitis, AIDS and Cytomegalovirus (CMV) and fluid overload.   In the event that I wish to have an autologous transfusion of my own blood, or a directed don when the applicable recovery period ends for purposes of reinstating the DNAR order.   10. Patients having a sterilization procedure: I understand that if the procedure is successful the results will be permanent and it will therefore be impossible for me t

## (undated) NOTE — LETTER
[address] [place] [id number]  [phone]          Patient: Jim Gallegos   YOB: 1965   Date of Visit: 4/6/2020       Dear Employer,         April 6, 2020    At [facility] it is at all feasible for them to do so. COVID-19 is especially risky for high-risk patients (including, but not limited to, age over 61, immunosuppressed status due to disease or medication, chronic respiratory or heart conditions and diabetes).     · Alicia Briggs

## (undated) NOTE — LETTER
Date: 5/15/2020    Patient Name: Tami Menezes          To Whom it may concern: This letter has been written at the patient's request. The above patient was seen at the [facility] for treatment of a medical condition.     This patient sh

## (undated) NOTE — IP AVS SNAPSHOT
[address]            (For Outpatient Use Only) Initial Admit Date: 4/30/2021   Inpt/Obs Admit Date: Inpt: 4/30/21 / Obs: N/A   Discharge Date:    Gwen Esparza:  [de-identified]   MRN: [de-identified]   CSN: [id number]   CEID: [id number] Type:    Subscriber Name:  Subscriber :    Subscriber ID:  Pt Rel to Subscriber:    Hospital Account Financial Class: [facility]    May 6, 2021

## (undated) NOTE — MR AVS SNAPSHOT
[facility] [address] [place] [address] [address] [facility]  [address] [id number] [phone]               Thank you for choosing us for your health care visit with Esau López MD.  We are glad to serve you and happy to provide you wit Your physician has referred you to a specialist.  Your physician or the clinic staff will provide you with the phone number you should call to schedule your appointment.      If you are confident that your benefit plan will not require a referral or authori [employer] will allow you to access patient instructions from your recent visit,  view other health information, and more. To sign up or find more information, go to [email or web address] and click on the Sign Up Now link in the [employer] box.      Enter

## (undated) NOTE — ED AVS SNAPSHOT
Jim Gallegos   MRN: [id number]    Department:  [facility] Emergency Department   Date of Visit:  2/5/2020           Disclosure     Insurance plans vary and the physician(s) referred by the ER may not be covered by your plan.  Please contact you tell this physician (or your personal doctor if your instructions are to return to your personal doctor) about any new or lasting problems. The primary care or specialist physician will see patients referred from the [facility] Emergency Department.  Aishwarya Santos

## (undated) NOTE — LETTER
Mikaela Grimaldo [address]  [address] [facility] [address] [facility] [address], [address] [facility]  Authorization for Surgical Operation and Procedure       Date:___3/11/21________                                                                                                         Time:___ blood transfusion and/or blood products.   The following are some, but not all, of the potential risks that can occur: fever and allergic reactions, hemolytic reactions, transmission of diseases such as Hepatitis, AIDS and Cytomegalovirus (CMV) and fluid ov a person authorized to consent on my behalf). The surgeon or my attending physician will determine when the applicable recovery period ends for purposes of reinstating the DNAR order.   10. Patients having a sterilization procedure: I understand that if the patient asking for anesthesia services, I agree to:  a. Allow the anesthesiologist (anesthesia doctor) to give me medicine and do additional procedures as necessary.  Some examples are: Starting or using an “IV” to give me medicine, fluids or blood during m rhythms and nerve injury. 7. Regional Anesthesia (“spinal”, “epidural”, & “nerve blocks”): I understand that rare but potential complications include headache, bleeding, infection, seizure, irregular heart rhythms, and nerve injury.     I can change my mi

## (undated) NOTE — LETTER
Mikaela Grimaldo [address]  [address] [facility] [address], [address] [facility]  Authorization for Surgical Operation and Procedure     Date:___________                                                                                                         Time:__________ 4.   Should the need arise during my operation or immediate post-operative period, I also consent to the administration of blood and/or blood products.   Further, I understand that despite careful testing and screening of blood or blood products by eligio 8.   I recognize that in the event my procedure results in extended X-Ray/fluoroscopy time, I may develop a skin reaction. 9.  If I have a Do Not Attempt Resuscitation (DNAR) order in place, that status will be suspended while in the operating room, proc 1. IJadyn agree to be cared for by an anesthesiologist, who is specially trained to monitor me and give me medicine to put me to sleep or keep me comfortable during my procedure    I understand that my anesthesiologist is not an employee or ag 5. My doctor has explained to me other choices available to me for my care (alternatives).   6. Pregnant Patients (“epidural”):  I understand that the risks of having an epidural (medicine given into my back to help control pain during labor), include itchi

## (undated) NOTE — LETTER
Mikaela Grimaldo [address]  [address] [facility] [address], [address] [facility]  Authorization for Surgical Operation and Procedure       Date:____3/6/2021_______                                                                                                         Time:__ some, but not all, of the potential risks that can occur: fever and allergic reactions, hemolytic reactions, transmission of diseases such as Hepatitis, AIDS and Cytomegalovirus (CMV) and fluid overload.   In the event that I wish to have an autologous adler or my attending physician will determine when the applicable recovery period ends for purposes of reinstating the DNAR order.   10. Patients having a sterilization procedure: I understand that if the procedure is successful the results will be permanent and the anesthesiologist (anesthesia doctor) to give me medicine and do additional procedures as necessary.  Some examples are: Starting or using an “IV” to give me medicine, fluids or blood during my procedure, and having a breathing tube placed to help me april “epidural”, & “nerve blocks”): I understand that rare but potential complications include headache, bleeding, infection, seizure, irregular heart rhythms, and nerve injury.     I can change my mind about having anesthesia services at any time before I get

## (undated) NOTE — LETTER
Date: 4/20/2020    Patient Name: Fede Vasquez          To Whom it may concern:     Mr. Jayda Viramontes should be excused from work from 4/20/20 - 4/27/20 due to respiratory illness and lung sarcoidosis exacerbation.      Next appt 4/27/20.          Sincerely,

## (undated) NOTE — LETTER
07/27/17        Leodan Villagomez  [address] [address]      Dear Leigha Schmitz,    [address] [facility] records indicate that you have outstanding lab work and or testing that was ordered for you and has not yet been completed:          CBC, Platelet;  No Diff

## (undated) NOTE — LETTER
Date: 5/29/2020    Patient Name: Ravin Del Cid          To Whom it may concern:     This letter has been written at the patient's request. The above patient was seen at the [facility] for treatment of a medical condition.     This patient s

## (undated) NOTE — MR AVS SNAPSHOT
[facility] [address] [place] [address] [address] [facility]  [address] [address] [phone]               Thank you for choosing us for your health care visit with Megan Salazar MD.  We are glad to serve you and happy to provide you wit azithromycin 250 MG Tabs   Take two tablets by mouth today, then one tablet daily. Commonly known as:  ZITHROMAX Z-VICKY           Esomeprazole Magnesium 40 MG Cpdr   Take 1 capsule (40 mg total) by mouth daily.    Commonly known as:  Mauri Helms Support Staff. Remember, MyChart is NOT to be used for urgent needs. For medical emergencies, dial 911.            Visit [facility] online at  [email or web address]

## (undated) NOTE — LETTER
Date: 4/27/2020    Patient Name: Veronica Hogue          To Whom it may concern:     Mr. Kaitlyn Gomez should be excused from work from 4/27/20 - 5/4/20 due to respiratory illness and lung sarcoidosis exacerbation.      Next appt 5/4/20.          Sincerely,

## (undated) NOTE — LETTER
Date: 6/12/2020    Patient Name: Andrea Olmedo          To Whom it may concern:     This letter has been written at the patient's request. The above patient was seen at the [facility] for treatment of a medical condition.     This patient s